# Patient Record
Sex: MALE | Race: BLACK OR AFRICAN AMERICAN | Employment: UNEMPLOYED | URBAN - METROPOLITAN AREA
[De-identification: names, ages, dates, MRNs, and addresses within clinical notes are randomized per-mention and may not be internally consistent; named-entity substitution may affect disease eponyms.]

---

## 2017-03-20 ENCOUNTER — OFFICE VISIT (OUTPATIENT)
Dept: INTERNAL MEDICINE | Age: 62
End: 2017-03-20
Payer: COMMERCIAL

## 2017-03-20 VITALS
SYSTOLIC BLOOD PRESSURE: 123 MMHG | HEART RATE: 73 BPM | BODY MASS INDEX: 27.58 KG/M2 | DIASTOLIC BLOOD PRESSURE: 83 MMHG | WEIGHT: 182 LBS | HEIGHT: 68 IN

## 2017-03-20 DIAGNOSIS — Z01.89 NEEDS SLEEP APNEA ASSESSMENT: ICD-10-CM

## 2017-03-20 DIAGNOSIS — R51.9 HEADACHE, UNSPECIFIED HEADACHE TYPE: ICD-10-CM

## 2017-03-20 DIAGNOSIS — I10 ESSENTIAL HYPERTENSION: Primary | ICD-10-CM

## 2017-03-20 DIAGNOSIS — Z12.12 SCREENING FOR COLORECTAL CANCER: ICD-10-CM

## 2017-03-20 DIAGNOSIS — Z11.59 NEED FOR HEPATITIS C SCREENING TEST: ICD-10-CM

## 2017-03-20 DIAGNOSIS — Z13.1 SCREENING FOR DIABETES MELLITUS (DM): ICD-10-CM

## 2017-03-20 DIAGNOSIS — Z12.11 SCREENING FOR COLORECTAL CANCER: ICD-10-CM

## 2017-03-20 DIAGNOSIS — Z11.4 SCREENING FOR HIV WITHOUT PRESENCE OF RISK FACTORS: ICD-10-CM

## 2017-03-20 PROCEDURE — 99213 OFFICE O/P EST LOW 20 MIN: CPT | Performed by: STUDENT IN AN ORGANIZED HEALTH CARE EDUCATION/TRAINING PROGRAM

## 2017-03-20 ASSESSMENT — PATIENT HEALTH QUESTIONNAIRE - PHQ9
8. MOVING OR SPEAKING SO SLOWLY THAT OTHER PEOPLE COULD HAVE NOTICED. OR THE OPPOSITE, BEING SO FIGETY OR RESTLESS THAT YOU HAVE BEEN MOVING AROUND A LOT MORE THAN USUAL: 0
4. FEELING TIRED OR HAVING LITTLE ENERGY: 3
2. FEELING DOWN, DEPRESSED OR HOPELESS: 0
9. THOUGHTS THAT YOU WOULD BE BETTER OFF DEAD, OR OF HURTING YOURSELF: 0
SUM OF ALL RESPONSES TO PHQ9 QUESTIONS 1 & 2: 0
1. LITTLE INTEREST OR PLEASURE IN DOING THINGS: 0
7. TROUBLE CONCENTRATING ON THINGS, SUCH AS READING THE NEWSPAPER OR WATCHING TELEVISION: 0
3. TROUBLE FALLING OR STAYING ASLEEP: 3
5. POOR APPETITE OR OVEREATING: 0
6. FEELING BAD ABOUT YOURSELF - OR THAT YOU ARE A FAILURE OR HAVE LET YOURSELF OR YOUR FAMILY DOWN: 0
10. IF YOU CHECKED OFF ANY PROBLEMS, HOW DIFFICULT HAVE THESE PROBLEMS MADE IT FOR YOU TO DO YOUR WORK, TAKE CARE OF THINGS AT HOME, OR GET ALONG WITH OTHER PEOPLE: 1
SUM OF ALL RESPONSES TO PHQ QUESTIONS 1-9: 6

## 2017-03-21 RX ORDER — CHLORTHALIDONE 25 MG/1
25 TABLET ORAL DAILY
Qty: 30 TABLET | Refills: 3 | Status: SHIPPED | OUTPATIENT
Start: 2017-03-21 | End: 2017-06-30 | Stop reason: SDUPTHER

## 2017-04-11 ENCOUNTER — TELEPHONE (OUTPATIENT)
Dept: INTERNAL MEDICINE | Age: 62
End: 2017-04-11

## 2017-04-11 DIAGNOSIS — G47.33 OSA (OBSTRUCTIVE SLEEP APNEA): Primary | ICD-10-CM

## 2017-04-12 DIAGNOSIS — G47.33 OSA (OBSTRUCTIVE SLEEP APNEA): Primary | ICD-10-CM

## 2017-04-26 ENCOUNTER — HOSPITAL ENCOUNTER (OUTPATIENT)
Dept: SLEEP CENTER | Age: 62
Discharge: HOME | End: 2017-04-26
Payer: COMMERCIAL

## 2017-04-26 VITALS
DIASTOLIC BLOOD PRESSURE: 83 MMHG | HEIGHT: 68 IN | WEIGHT: 182 LBS | SYSTOLIC BLOOD PRESSURE: 123 MMHG | HEART RATE: 72 BPM | RESPIRATION RATE: 14 BRPM | BODY MASS INDEX: 27.58 KG/M2

## 2017-04-26 DIAGNOSIS — G47.33 OSA (OBSTRUCTIVE SLEEP APNEA): Primary | ICD-10-CM

## 2017-04-26 PROCEDURE — 95810 POLYSOM 6/> YRS 4/> PARAM: CPT

## 2017-04-26 ASSESSMENT — SLEEP AND FATIGUE QUESTIONNAIRES
HOW LIKELY ARE YOU TO NOD OFF OR FALL ASLEEP WHEN YOU ARE A PASSENGER IN A CAR FOR AN HOUR WITHOUT A BREAK: 3
HOW LIKELY ARE YOU TO NOD OFF OR FALL ASLEEP IN A CAR, WHILE STOPPED FOR A FEW MINUTES IN TRAFFIC: 0
HOW LIKELY ARE YOU TO NOD OFF OR FALL ASLEEP WHILE SITTING INACTIVE IN A PUBLIC PLACE: 0
HOW LIKELY ARE YOU TO NOD OFF OR FALL ASLEEP WHILE LYING DOWN TO REST IN THE AFTERNOON WHEN CIRCUMSTANCES PERMIT: 2
HOW LIKELY ARE YOU TO NOD OFF OR FALL ASLEEP WHILE SITTING AND READING: 0
HOW LIKELY ARE YOU TO NOD OFF OR FALL ASLEEP WHILE WATCHING TV: 2
HOW LIKELY ARE YOU TO NOD OFF OR FALL ASLEEP WHILE SITTING AND TALKING TO SOMEONE: 0
ESS TOTAL SCORE: 9
HOW LIKELY ARE YOU TO NOD OFF OR FALL ASLEEP WHILE SITTING QUIETLY AFTER LUNCH WITHOUT ALCOHOL: 2

## 2017-04-27 ASSESSMENT — SLEEP AND FATIGUE QUESTIONNAIRES: NECK CIRCUMFERENCE (INCHES): 15.35

## 2017-06-28 DIAGNOSIS — I10 HYPERTENSION, WELL CONTROLLED: ICD-10-CM

## 2017-06-30 ENCOUNTER — OFFICE VISIT (OUTPATIENT)
Dept: INTERNAL MEDICINE | Age: 62
End: 2017-06-30
Payer: COMMERCIAL

## 2017-06-30 VITALS
DIASTOLIC BLOOD PRESSURE: 88 MMHG | SYSTOLIC BLOOD PRESSURE: 137 MMHG | BODY MASS INDEX: 28.34 KG/M2 | WEIGHT: 187 LBS | HEIGHT: 68 IN | HEART RATE: 69 BPM

## 2017-06-30 DIAGNOSIS — Z23 NEED FOR ZOSTAVAX ADMINISTRATION: ICD-10-CM

## 2017-06-30 DIAGNOSIS — I10 HYPERTENSION, WELL CONTROLLED: Primary | ICD-10-CM

## 2017-06-30 DIAGNOSIS — F17.200 SMOKING: ICD-10-CM

## 2017-06-30 PROCEDURE — 99213 OFFICE O/P EST LOW 20 MIN: CPT | Performed by: INTERNAL MEDICINE

## 2017-06-30 RX ORDER — VARENICLINE TARTRATE 1 MG/1
1 TABLET, FILM COATED ORAL 2 TIMES DAILY
Qty: 60 TABLET | Refills: 3 | Status: SHIPPED | OUTPATIENT
Start: 2017-06-30 | End: 2018-08-17

## 2017-06-30 RX ORDER — CHLORTHALIDONE 25 MG/1
25 TABLET ORAL DAILY
Qty: 30 TABLET | Refills: 3 | Status: SHIPPED | OUTPATIENT
Start: 2017-06-30 | End: 2017-09-29 | Stop reason: SDUPTHER

## 2017-06-30 RX ORDER — VARENICLINE TARTRATE 25 MG
KIT ORAL
Qty: 1 EACH | Refills: 0 | Status: ON HOLD | OUTPATIENT
Start: 2017-06-30 | End: 2017-09-02 | Stop reason: HOSPADM

## 2017-08-31 ENCOUNTER — APPOINTMENT (OUTPATIENT)
Dept: MRI IMAGING | Age: 62
DRG: 054 | End: 2017-08-31
Payer: COMMERCIAL

## 2017-08-31 ENCOUNTER — HOSPITAL ENCOUNTER (INPATIENT)
Age: 62
LOS: 2 days | Discharge: HOME | DRG: 054 | End: 2017-09-02
Attending: EMERGENCY MEDICINE | Admitting: INTERNAL MEDICINE
Payer: COMMERCIAL

## 2017-08-31 ENCOUNTER — APPOINTMENT (OUTPATIENT)
Dept: CT IMAGING | Age: 62
DRG: 054 | End: 2017-08-31
Payer: COMMERCIAL

## 2017-08-31 DIAGNOSIS — G43.919 INTRACTABLE MIGRAINE WITHOUT STATUS MIGRAINOSUS, UNSPECIFIED MIGRAINE TYPE: Primary | ICD-10-CM

## 2017-08-31 LAB
-: ABNORMAL
ABSOLUTE EOS #: 0.5 K/UL (ref 0–0.4)
ABSOLUTE LYMPH #: 3.32 K/UL (ref 1–4.8)
ABSOLUTE MONO #: 2.32 K/UL (ref 0.1–0.8)
AMORPHOUS: ABNORMAL
AMPHETAMINE SCREEN URINE: NEGATIVE
ANION GAP SERPL CALCULATED.3IONS-SCNC: 14 MMOL/L (ref 9–17)
BACTERIA: ABNORMAL
BARBITURATE SCREEN URINE: POSITIVE
BASOPHILS # BLD: 0 %
BASOPHILS ABSOLUTE: 0 K/UL (ref 0–0.2)
BENZODIAZEPINE SCREEN, URINE: NEGATIVE
BILIRUBIN URINE: NEGATIVE
BUN BLDV-MCNC: 17 MG/DL (ref 8–23)
BUN/CREAT BLD: ABNORMAL (ref 9–20)
BUPRENORPHINE URINE: ABNORMAL
C-REACTIVE PROTEIN: 91.9 MG/L (ref 0–5)
CALCIUM SERPL-MCNC: 9.4 MG/DL (ref 8.6–10.4)
CANNABINOID SCREEN URINE: NEGATIVE
CASTS UA: ABNORMAL /LPF (ref 0–8)
CHLORIDE BLD-SCNC: 100 MMOL/L (ref 98–107)
CO2: 23 MMOL/L (ref 20–31)
COCAINE METABOLITE, URINE: NEGATIVE
COLOR: YELLOW
CREAT SERPL-MCNC: 0.92 MG/DL (ref 0.7–1.2)
CRYSTALS, UA: ABNORMAL /HPF
DIFFERENTIAL TYPE: ABNORMAL
EOSINOPHILS RELATIVE PERCENT: 3 %
EPITHELIAL CELLS UA: ABNORMAL /HPF (ref 0–5)
ETHANOL PERCENT: <0.01 %
ETHANOL: <10 MG/DL
GFR AFRICAN AMERICAN: >60 ML/MIN
GFR NON-AFRICAN AMERICAN: >60 ML/MIN
GFR SERPL CREATININE-BSD FRML MDRD: ABNORMAL ML/MIN/{1.73_M2}
GFR SERPL CREATININE-BSD FRML MDRD: ABNORMAL ML/MIN/{1.73_M2}
GLUCOSE BLD-MCNC: 155 MG/DL (ref 70–99)
GLUCOSE URINE: NEGATIVE
HCT VFR BLD CALC: 44.7 % (ref 41–53)
HEMOGLOBIN: 15 G/DL (ref 13.5–17.5)
KETONES, URINE: NEGATIVE
LEUKOCYTE ESTERASE, URINE: ABNORMAL
LYMPHOCYTES # BLD: 20 %
MCH RBC QN AUTO: 31.5 PG (ref 26–34)
MCHC RBC AUTO-ENTMCNC: 33.6 G/DL (ref 31–37)
MCV RBC AUTO: 93.8 FL (ref 80–100)
MDMA URINE: ABNORMAL
METHADONE SCREEN, URINE: NEGATIVE
METHAMPHETAMINE, URINE: ABNORMAL
MONOCYTES # BLD: 14 %
MORPHOLOGY: NORMAL
MUCUS: ABNORMAL
NITRITE, URINE: NEGATIVE
OPIATES, URINE: NEGATIVE
OTHER OBSERVATIONS UA: ABNORMAL
OXYCODONE SCREEN URINE: NEGATIVE
PDW BLD-RTO: 13.4 % (ref 12.5–15.4)
PH UA: 6 (ref 5–8)
PHENCYCLIDINE, URINE: NEGATIVE
PLATELET # BLD: 189 K/UL (ref 140–450)
PLATELET ESTIMATE: ABNORMAL
PMV BLD AUTO: 8.1 FL (ref 6–12)
POTASSIUM SERPL-SCNC: 3.7 MMOL/L (ref 3.7–5.3)
PROPOXYPHENE, URINE: ABNORMAL
PROTEIN UA: NEGATIVE
RBC # BLD: 4.77 M/UL (ref 4.5–5.9)
RBC # BLD: ABNORMAL 10*6/UL
RBC UA: ABNORMAL /HPF (ref 0–4)
RENAL EPITHELIAL, UA: ABNORMAL /HPF
SEDIMENTATION RATE, ERYTHROCYTE: 30 MM (ref 0–10)
SEG NEUTROPHILS: 63 %
SEGMENTED NEUTROPHILS ABSOLUTE COUNT: 10.46 K/UL (ref 1.8–7.7)
SODIUM BLD-SCNC: 137 MMOL/L (ref 135–144)
SPECIFIC GRAVITY UA: 1.07 (ref 1–1.03)
TEST INFORMATION: ABNORMAL
TRICHOMONAS: ABNORMAL
TRICYCLIC ANTIDEPRESSANTS, UR: ABNORMAL
TURBIDITY: CLEAR
URINE HGB: NEGATIVE
UROBILINOGEN, URINE: NORMAL
WBC # BLD: 16.6 K/UL (ref 3.5–11)
WBC # BLD: ABNORMAL 10*3/UL
WBC UA: ABNORMAL /HPF (ref 0–5)
YEAST: ABNORMAL

## 2017-08-31 PROCEDURE — 96375 TX/PRO/DX INJ NEW DRUG ADDON: CPT

## 2017-08-31 PROCEDURE — 2500000003 HC RX 250 WO HCPCS: Performed by: EMERGENCY MEDICINE

## 2017-08-31 PROCEDURE — 96365 THER/PROPH/DIAG IV INF INIT: CPT

## 2017-08-31 PROCEDURE — 86140 C-REACTIVE PROTEIN: CPT

## 2017-08-31 PROCEDURE — 85025 COMPLETE CBC W/AUTO DIFF WBC: CPT

## 2017-08-31 PROCEDURE — 70496 CT ANGIOGRAPHY HEAD: CPT

## 2017-08-31 PROCEDURE — 99285 EMERGENCY DEPT VISIT HI MDM: CPT

## 2017-08-31 PROCEDURE — 6360000004 HC RX CONTRAST MEDICATION: Performed by: EMERGENCY MEDICINE

## 2017-08-31 PROCEDURE — 85651 RBC SED RATE NONAUTOMATED: CPT

## 2017-08-31 PROCEDURE — 6360000002 HC RX W HCPCS: Performed by: EMERGENCY MEDICINE

## 2017-08-31 PROCEDURE — 6360000002 HC RX W HCPCS

## 2017-08-31 PROCEDURE — 80307 DRUG TEST PRSMV CHEM ANLYZR: CPT

## 2017-08-31 PROCEDURE — 2580000003 HC RX 258: Performed by: EMERGENCY MEDICINE

## 2017-08-31 PROCEDURE — 70498 CT ANGIOGRAPHY NECK: CPT

## 2017-08-31 PROCEDURE — 81001 URINALYSIS AUTO W/SCOPE: CPT

## 2017-08-31 PROCEDURE — 80048 BASIC METABOLIC PNL TOTAL CA: CPT

## 2017-08-31 PROCEDURE — 70450 CT HEAD/BRAIN W/O DYE: CPT

## 2017-08-31 PROCEDURE — 6370000000 HC RX 637 (ALT 250 FOR IP): Performed by: INTERNAL MEDICINE

## 2017-08-31 PROCEDURE — 1200000000 HC SEMI PRIVATE

## 2017-08-31 PROCEDURE — 6360000002 HC RX W HCPCS: Performed by: INTERNAL MEDICINE

## 2017-08-31 PROCEDURE — 70551 MRI BRAIN STEM W/O DYE: CPT

## 2017-08-31 PROCEDURE — 6370000000 HC RX 637 (ALT 250 FOR IP): Performed by: EMERGENCY MEDICINE

## 2017-08-31 PROCEDURE — 99222 1ST HOSP IP/OBS MODERATE 55: CPT | Performed by: PSYCHIATRY & NEUROLOGY

## 2017-08-31 PROCEDURE — 93005 ELECTROCARDIOGRAM TRACING: CPT

## 2017-08-31 PROCEDURE — G0480 DRUG TEST DEF 1-7 CLASSES: HCPCS

## 2017-08-31 RX ORDER — ASPIRIN 81 MG/1
81 TABLET ORAL DAILY
Status: DISCONTINUED | OUTPATIENT
Start: 2017-08-31 | End: 2017-09-02 | Stop reason: HOSPADM

## 2017-08-31 RX ORDER — MORPHINE SULFATE 4 MG/ML
4 INJECTION, SOLUTION INTRAMUSCULAR; INTRAVENOUS ONCE
Status: COMPLETED | OUTPATIENT
Start: 2017-08-31 | End: 2017-08-31

## 2017-08-31 RX ORDER — HYDROCODONE BITARTRATE AND ACETAMINOPHEN 5; 325 MG/1; MG/1
1 TABLET ORAL EVERY 4 HOURS PRN
Status: DISCONTINUED | OUTPATIENT
Start: 2017-08-31 | End: 2017-09-02 | Stop reason: HOSPADM

## 2017-08-31 RX ORDER — 0.9 % SODIUM CHLORIDE 0.9 %
1000 INTRAVENOUS SOLUTION INTRAVENOUS ONCE
Status: COMPLETED | OUTPATIENT
Start: 2017-08-31 | End: 2017-08-31

## 2017-08-31 RX ORDER — ACETAMINOPHEN 325 MG/1
650 TABLET ORAL EVERY 4 HOURS PRN
Status: DISCONTINUED | OUTPATIENT
Start: 2017-08-31 | End: 2017-09-02 | Stop reason: HOSPADM

## 2017-08-31 RX ORDER — CHLORTHALIDONE 25 MG/1
25 TABLET ORAL DAILY
Status: DISCONTINUED | OUTPATIENT
Start: 2017-08-31 | End: 2017-09-02 | Stop reason: HOSPADM

## 2017-08-31 RX ORDER — LORAZEPAM 2 MG/ML
2 INJECTION INTRAMUSCULAR ONCE
Status: COMPLETED | OUTPATIENT
Start: 2017-08-31 | End: 2017-08-31

## 2017-08-31 RX ORDER — SODIUM CHLORIDE 0.9 % (FLUSH) 0.9 %
10 SYRINGE (ML) INJECTION EVERY 12 HOURS SCHEDULED
Status: DISCONTINUED | OUTPATIENT
Start: 2017-08-31 | End: 2017-09-02 | Stop reason: HOSPADM

## 2017-08-31 RX ORDER — MORPHINE SULFATE 2 MG/ML
2 INJECTION, SOLUTION INTRAMUSCULAR; INTRAVENOUS
Status: DISCONTINUED | OUTPATIENT
Start: 2017-08-31 | End: 2017-09-02 | Stop reason: HOSPADM

## 2017-08-31 RX ORDER — ASPIRIN 300 MG/1
300 SUPPOSITORY RECTAL DAILY
Status: DISCONTINUED | OUTPATIENT
Start: 2017-08-31 | End: 2017-09-02 | Stop reason: HOSPADM

## 2017-08-31 RX ORDER — VARENICLINE TARTRATE 0.5 MG/1
1 TABLET, FILM COATED ORAL 2 TIMES DAILY
Status: DISCONTINUED | OUTPATIENT
Start: 2017-08-31 | End: 2017-09-02 | Stop reason: HOSPADM

## 2017-08-31 RX ORDER — ONDANSETRON 2 MG/ML
4 INJECTION INTRAMUSCULAR; INTRAVENOUS EVERY 6 HOURS PRN
Status: DISCONTINUED | OUTPATIENT
Start: 2017-08-31 | End: 2017-09-02 | Stop reason: HOSPADM

## 2017-08-31 RX ORDER — ATORVASTATIN CALCIUM 40 MG/1
40 TABLET, FILM COATED ORAL NIGHTLY
Status: DISCONTINUED | OUTPATIENT
Start: 2017-08-31 | End: 2017-09-02 | Stop reason: HOSPADM

## 2017-08-31 RX ORDER — MORPHINE SULFATE 4 MG/ML
4 INJECTION, SOLUTION INTRAMUSCULAR; INTRAVENOUS
Status: DISCONTINUED | OUTPATIENT
Start: 2017-08-31 | End: 2017-09-02 | Stop reason: HOSPADM

## 2017-08-31 RX ORDER — LORAZEPAM 2 MG/ML
INJECTION INTRAMUSCULAR
Status: COMPLETED
Start: 2017-08-31 | End: 2017-08-31

## 2017-08-31 RX ORDER — ASPIRIN 81 MG/1
324 TABLET, CHEWABLE ORAL ONCE
Status: COMPLETED | OUTPATIENT
Start: 2017-08-31 | End: 2017-08-31

## 2017-08-31 RX ORDER — SODIUM CHLORIDE 0.9 % (FLUSH) 0.9 %
10 SYRINGE (ML) INJECTION PRN
Status: DISCONTINUED | OUTPATIENT
Start: 2017-08-31 | End: 2017-09-02 | Stop reason: HOSPADM

## 2017-08-31 RX ORDER — DIPHENHYDRAMINE HYDROCHLORIDE 50 MG/ML
25 INJECTION INTRAMUSCULAR; INTRAVENOUS ONCE
Status: COMPLETED | OUTPATIENT
Start: 2017-08-31 | End: 2017-08-31

## 2017-08-31 RX ORDER — BISACODYL 10 MG
10 SUPPOSITORY, RECTAL RECTAL DAILY PRN
Status: DISCONTINUED | OUTPATIENT
Start: 2017-08-31 | End: 2017-09-02 | Stop reason: HOSPADM

## 2017-08-31 RX ORDER — DEXAMETHASONE SODIUM PHOSPHATE 4 MG/ML
10 INJECTION, SOLUTION INTRA-ARTICULAR; INTRALESIONAL; INTRAMUSCULAR; INTRAVENOUS; SOFT TISSUE ONCE
Status: COMPLETED | OUTPATIENT
Start: 2017-08-31 | End: 2017-08-31

## 2017-08-31 RX ADMIN — ENOXAPARIN SODIUM 40 MG: 40 INJECTION SUBCUTANEOUS at 18:31

## 2017-08-31 RX ADMIN — ASPIRIN 81 MG 324 MG: 81 TABLET ORAL at 13:45

## 2017-08-31 RX ADMIN — LORAZEPAM 2 MG: 2 INJECTION INTRAMUSCULAR at 15:00

## 2017-08-31 RX ADMIN — FOLIC ACID 1 MG: 5 INJECTION, SOLUTION INTRAMUSCULAR; INTRAVENOUS; SUBCUTANEOUS at 18:31

## 2017-08-31 RX ADMIN — DIPHENHYDRAMINE HYDROCHLORIDE 25 MG: 50 INJECTION, SOLUTION INTRAMUSCULAR; INTRAVENOUS at 12:09

## 2017-08-31 RX ADMIN — MORPHINE SULFATE 4 MG: 4 INJECTION, SOLUTION INTRAMUSCULAR; INTRAVENOUS at 13:27

## 2017-08-31 RX ADMIN — PROCHLORPERAZINE EDISYLATE 10 MG: 5 INJECTION INTRAMUSCULAR; INTRAVENOUS at 12:08

## 2017-08-31 RX ADMIN — ATORVASTATIN CALCIUM 40 MG: 40 TABLET, FILM COATED ORAL at 21:40

## 2017-08-31 RX ADMIN — METOPROLOL TARTRATE 12.5 MG: 25 TABLET ORAL at 21:40

## 2017-08-31 RX ADMIN — SODIUM CHLORIDE 1000 ML: 9 INJECTION, SOLUTION INTRAVENOUS at 12:08

## 2017-08-31 RX ADMIN — IOVERSOL 90 ML: 741 INJECTION INTRA-ARTERIAL; INTRAVENOUS at 12:55

## 2017-08-31 RX ADMIN — LORAZEPAM 2 MG: 2 INJECTION INTRAMUSCULAR; INTRAVENOUS at 15:00

## 2017-08-31 RX ADMIN — THIAMINE HYDROCHLORIDE 100 MG: 100 INJECTION, SOLUTION INTRAMUSCULAR; INTRAVENOUS at 13:26

## 2017-08-31 RX ADMIN — DEXAMETHASONE SODIUM PHOSPHATE 10 MG: 4 INJECTION, SOLUTION INTRAMUSCULAR; INTRAVENOUS at 12:09

## 2017-08-31 ASSESSMENT — PAIN DESCRIPTION - PAIN TYPE
TYPE: ACUTE PAIN
TYPE: ACUTE PAIN

## 2017-08-31 ASSESSMENT — PAIN DESCRIPTION - DESCRIPTORS
DESCRIPTORS: SHARP
DESCRIPTORS: SHARP

## 2017-08-31 ASSESSMENT — PAIN DESCRIPTION - ORIENTATION
ORIENTATION: RIGHT;LEFT
ORIENTATION: RIGHT;LEFT

## 2017-08-31 ASSESSMENT — ENCOUNTER SYMPTOMS
VOMITING: 0
NAUSEA: 0
SHORTNESS OF BREATH: 0
ABDOMINAL PAIN: 0
BACK PAIN: 0
PHOTOPHOBIA: 1

## 2017-08-31 ASSESSMENT — PAIN DESCRIPTION - LOCATION
LOCATION: HEAD
LOCATION: HEAD

## 2017-08-31 ASSESSMENT — PAIN DESCRIPTION - PROGRESSION
CLINICAL_PROGRESSION: RESOLVED
CLINICAL_PROGRESSION: NOT CHANGED
CLINICAL_PROGRESSION: RESOLVED

## 2017-08-31 ASSESSMENT — PAIN SCALES - GENERAL
PAINLEVEL_OUTOF10: 4
PAINLEVEL_OUTOF10: 10
PAINLEVEL_OUTOF10: 0
PAINLEVEL_OUTOF10: 0

## 2017-08-31 ASSESSMENT — PAIN DESCRIPTION - FREQUENCY
FREQUENCY: CONTINUOUS
FREQUENCY: CONTINUOUS

## 2017-09-01 PROBLEM — D72.829 LEUKOCYTOSIS: Status: ACTIVE | Noted: 2017-09-01

## 2017-09-01 LAB
ALBUMIN SERPL-MCNC: 3.7 G/DL (ref 3.5–5.2)
ALBUMIN/GLOBULIN RATIO: 0.9 (ref 1–2.5)
ALP BLD-CCNC: 82 U/L (ref 40–129)
ALT SERPL-CCNC: 37 U/L (ref 5–41)
ANION GAP SERPL CALCULATED.3IONS-SCNC: 17 MMOL/L (ref 9–17)
AST SERPL-CCNC: 25 U/L
BILIRUB SERPL-MCNC: 0.44 MG/DL (ref 0.3–1.2)
BUN BLDV-MCNC: 18 MG/DL (ref 8–23)
BUN/CREAT BLD: ABNORMAL (ref 9–20)
CALCIUM SERPL-MCNC: 9.1 MG/DL (ref 8.6–10.4)
CHLORIDE BLD-SCNC: 103 MMOL/L (ref 98–107)
CHOLESTEROL/HDL RATIO: 2.9
CHOLESTEROL: 116 MG/DL
CO2: 20 MMOL/L (ref 20–31)
CREAT SERPL-MCNC: 0.75 MG/DL (ref 0.7–1.2)
EKG ATRIAL RATE: 71 BPM
EKG P AXIS: 47 DEGREES
EKG P-R INTERVAL: 166 MS
EKG Q-T INTERVAL: 438 MS
EKG QRS DURATION: 90 MS
EKG QTC CALCULATION (BAZETT): 475 MS
EKG R AXIS: 23 DEGREES
EKG T AXIS: 52 DEGREES
EKG VENTRICULAR RATE: 71 BPM
ESTIMATED AVERAGE GLUCOSE: 128 MG/DL
GFR AFRICAN AMERICAN: >60 ML/MIN
GFR NON-AFRICAN AMERICAN: >60 ML/MIN
GFR SERPL CREATININE-BSD FRML MDRD: ABNORMAL ML/MIN/{1.73_M2}
GFR SERPL CREATININE-BSD FRML MDRD: ABNORMAL ML/MIN/{1.73_M2}
GLUCOSE BLD-MCNC: 140 MG/DL (ref 70–99)
HBA1C MFR BLD: 6.1 % (ref 4–6)
HCT VFR BLD CALC: 42.3 % (ref 41–53)
HDLC SERPL-MCNC: 40 MG/DL
HEMOGLOBIN: 14 G/DL (ref 13.5–17.5)
LDL CHOLESTEROL: 58 MG/DL (ref 0–130)
LV EF: 55 %
LVEF MODALITY: NORMAL
MCH RBC QN AUTO: 31.6 PG (ref 26–34)
MCHC RBC AUTO-ENTMCNC: 33.2 G/DL (ref 31–37)
MCV RBC AUTO: 95.3 FL (ref 80–100)
PDW BLD-RTO: 13.2 % (ref 12.5–15.4)
PLATELET # BLD: 200 K/UL (ref 140–450)
PMV BLD AUTO: 8.5 FL (ref 6–12)
POTASSIUM SERPL-SCNC: 3.8 MMOL/L (ref 3.7–5.3)
RBC # BLD: 4.44 M/UL (ref 4.5–5.9)
SODIUM BLD-SCNC: 140 MMOL/L (ref 135–144)
TOTAL PROTEIN: 7.6 G/DL (ref 6.4–8.3)
TRIGL SERPL-MCNC: 92 MG/DL
VLDLC SERPL CALC-MCNC: ABNORMAL MG/DL (ref 1–30)
WBC # BLD: 16.7 K/UL (ref 3.5–11)

## 2017-09-01 PROCEDURE — 97535 SELF CARE MNGMENT TRAINING: CPT

## 2017-09-01 PROCEDURE — 99222 1ST HOSP IP/OBS MODERATE 55: CPT | Performed by: PSYCHIATRY & NEUROLOGY

## 2017-09-01 PROCEDURE — 80061 LIPID PANEL: CPT

## 2017-09-01 PROCEDURE — G8987 SELF CARE CURRENT STATUS: HCPCS

## 2017-09-01 PROCEDURE — 99406 BEHAV CHNG SMOKING 3-10 MIN: CPT

## 2017-09-01 PROCEDURE — G8989 SELF CARE D/C STATUS: HCPCS

## 2017-09-01 PROCEDURE — 85027 COMPLETE CBC AUTOMATED: CPT

## 2017-09-01 PROCEDURE — 94762 N-INVAS EAR/PLS OXIMTRY CONT: CPT

## 2017-09-01 PROCEDURE — 6370000000 HC RX 637 (ALT 250 FOR IP): Performed by: INTERNAL MEDICINE

## 2017-09-01 PROCEDURE — 99222 1ST HOSP IP/OBS MODERATE 55: CPT | Performed by: INTERNAL MEDICINE

## 2017-09-01 PROCEDURE — 93306 TTE W/DOPPLER COMPLETE: CPT

## 2017-09-01 PROCEDURE — 80053 COMPREHEN METABOLIC PANEL: CPT

## 2017-09-01 PROCEDURE — 97165 OT EVAL LOW COMPLEX 30 MIN: CPT

## 2017-09-01 PROCEDURE — 2580000003 HC RX 258: Performed by: INTERNAL MEDICINE

## 2017-09-01 PROCEDURE — 6360000002 HC RX W HCPCS: Performed by: INTERNAL MEDICINE

## 2017-09-01 PROCEDURE — G8988 SELF CARE GOAL STATUS: HCPCS

## 2017-09-01 PROCEDURE — 83036 HEMOGLOBIN GLYCOSYLATED A1C: CPT

## 2017-09-01 PROCEDURE — 1200000000 HC SEMI PRIVATE

## 2017-09-01 PROCEDURE — 36415 COLL VENOUS BLD VENIPUNCTURE: CPT

## 2017-09-01 PROCEDURE — 6370000000 HC RX 637 (ALT 250 FOR IP): Performed by: NURSE PRACTITIONER

## 2017-09-01 RX ORDER — SUMATRIPTAN 50 MG/1
50 TABLET, FILM COATED ORAL DAILY PRN
Status: DISCONTINUED | OUTPATIENT
Start: 2017-09-01 | End: 2017-09-02 | Stop reason: HOSPADM

## 2017-09-01 RX ORDER — METHYLPREDNISOLONE SODIUM SUCCINATE 40 MG/ML
40 INJECTION, POWDER, LYOPHILIZED, FOR SOLUTION INTRAMUSCULAR; INTRAVENOUS EVERY 6 HOURS
Status: DISCONTINUED | OUTPATIENT
Start: 2017-09-01 | End: 2017-09-01

## 2017-09-01 RX ORDER — AMITRIPTYLINE HYDROCHLORIDE 25 MG/1
12.5 TABLET, FILM COATED ORAL NIGHTLY
Status: DISCONTINUED | OUTPATIENT
Start: 2017-09-01 | End: 2017-09-02 | Stop reason: HOSPADM

## 2017-09-01 RX ORDER — AMITRIPTYLINE HYDROCHLORIDE 25 MG/1
25 TABLET, FILM COATED ORAL NIGHTLY
Status: DISCONTINUED | OUTPATIENT
Start: 2017-09-08 | End: 2017-09-02 | Stop reason: HOSPADM

## 2017-09-01 RX ADMIN — METOPROLOL TARTRATE 12.5 MG: 25 TABLET ORAL at 08:45

## 2017-09-01 RX ADMIN — ATORVASTATIN CALCIUM 40 MG: 40 TABLET, FILM COATED ORAL at 21:06

## 2017-09-01 RX ADMIN — AMITRIPTYLINE HYDROCHLORIDE 12.5 MG: 25 TABLET, FILM COATED ORAL at 21:06

## 2017-09-01 RX ADMIN — CHLORTHALIDONE 25 MG: 25 TABLET ORAL at 08:47

## 2017-09-01 RX ADMIN — METHYLPREDNISOLONE SODIUM SUCCINATE 40 MG: 40 INJECTION, POWDER, FOR SOLUTION INTRAMUSCULAR; INTRAVENOUS at 12:43

## 2017-09-01 RX ADMIN — METOPROLOL TARTRATE 12.5 MG: 25 TABLET ORAL at 21:06

## 2017-09-01 RX ADMIN — ENOXAPARIN SODIUM 40 MG: 40 INJECTION SUBCUTANEOUS at 08:47

## 2017-09-01 RX ADMIN — Medication 10 ML: at 08:49

## 2017-09-01 RX ADMIN — ASPIRIN 81 MG: 81 TABLET ORAL at 08:47

## 2017-09-01 RX ADMIN — Medication 10 ML: at 21:06

## 2017-09-01 ASSESSMENT — PAIN SCALES - GENERAL: PAINLEVEL_OUTOF10: 0

## 2017-09-02 VITALS
DIASTOLIC BLOOD PRESSURE: 76 MMHG | TEMPERATURE: 97.9 F | WEIGHT: 182.5 LBS | OXYGEN SATURATION: 98 % | RESPIRATION RATE: 16 BRPM | SYSTOLIC BLOOD PRESSURE: 128 MMHG | HEIGHT: 68 IN | HEART RATE: 66 BPM | BODY MASS INDEX: 27.66 KG/M2

## 2017-09-02 LAB
C-REACTIVE PROTEIN: 17.9 MG/L (ref 0–5)
SEDIMENTATION RATE, ERYTHROCYTE: 33 MM (ref 0–10)

## 2017-09-02 PROCEDURE — 6360000002 HC RX W HCPCS: Performed by: INTERNAL MEDICINE

## 2017-09-02 PROCEDURE — 6370000000 HC RX 637 (ALT 250 FOR IP): Performed by: INTERNAL MEDICINE

## 2017-09-02 PROCEDURE — 36415 COLL VENOUS BLD VENIPUNCTURE: CPT

## 2017-09-02 PROCEDURE — 2580000003 HC RX 258: Performed by: INTERNAL MEDICINE

## 2017-09-02 PROCEDURE — 85651 RBC SED RATE NONAUTOMATED: CPT

## 2017-09-02 PROCEDURE — 94762 N-INVAS EAR/PLS OXIMTRY CONT: CPT

## 2017-09-02 PROCEDURE — 99232 SBSQ HOSP IP/OBS MODERATE 35: CPT | Performed by: INTERNAL MEDICINE

## 2017-09-02 PROCEDURE — 86140 C-REACTIVE PROTEIN: CPT

## 2017-09-02 RX ORDER — AMITRIPTYLINE HYDROCHLORIDE 25 MG/1
25 TABLET, FILM COATED ORAL NIGHTLY
Qty: 30 TABLET | Refills: 3 | Status: SHIPPED | OUTPATIENT
Start: 2017-09-08 | End: 2017-09-29 | Stop reason: SDUPTHER

## 2017-09-02 RX ORDER — ASPIRIN 81 MG/1
81 TABLET ORAL DAILY
Qty: 30 TABLET | Refills: 3 | Status: SHIPPED | OUTPATIENT
Start: 2017-09-02 | End: 2017-09-29 | Stop reason: SDUPTHER

## 2017-09-02 RX ORDER — BUTALBITAL, ACETAMINOPHEN AND CAFFEINE 50; 325; 40 MG/1; MG/1; MG/1
1 TABLET ORAL EVERY 4 HOURS PRN
Qty: 60 TABLET | Refills: 0 | Status: SHIPPED | OUTPATIENT
Start: 2017-09-02 | End: 2017-09-29 | Stop reason: SDUPTHER

## 2017-09-02 RX ADMIN — Medication 10 ML: at 09:19

## 2017-09-02 RX ADMIN — ASPIRIN 81 MG: 81 TABLET ORAL at 09:18

## 2017-09-02 RX ADMIN — ENOXAPARIN SODIUM 40 MG: 40 INJECTION SUBCUTANEOUS at 09:20

## 2017-09-02 RX ADMIN — METOPROLOL TARTRATE 12.5 MG: 25 TABLET ORAL at 09:18

## 2017-09-02 RX ADMIN — CHLORTHALIDONE 25 MG: 25 TABLET ORAL at 09:18

## 2017-09-29 ENCOUNTER — OFFICE VISIT (OUTPATIENT)
Dept: INTERNAL MEDICINE | Age: 62
End: 2017-09-29
Payer: COMMERCIAL

## 2017-09-29 VITALS
HEIGHT: 68 IN | SYSTOLIC BLOOD PRESSURE: 135 MMHG | BODY MASS INDEX: 28.46 KG/M2 | HEART RATE: 87 BPM | DIASTOLIC BLOOD PRESSURE: 80 MMHG | WEIGHT: 187.8 LBS

## 2017-09-29 DIAGNOSIS — Z11.59 NEED FOR HEPATITIS C SCREENING TEST: ICD-10-CM

## 2017-09-29 DIAGNOSIS — Z11.4 SCREENING FOR HIV (HUMAN IMMUNODEFICIENCY VIRUS): ICD-10-CM

## 2017-09-29 DIAGNOSIS — Z23 NEED FOR SHINGLES VACCINE: ICD-10-CM

## 2017-09-29 DIAGNOSIS — I10 HYPERTENSION, WELL CONTROLLED: Primary | ICD-10-CM

## 2017-09-29 DIAGNOSIS — R51.9 CHRONIC INTRACTABLE HEADACHE, UNSPECIFIED HEADACHE TYPE: ICD-10-CM

## 2017-09-29 DIAGNOSIS — G89.29 CHRONIC INTRACTABLE HEADACHE, UNSPECIFIED HEADACHE TYPE: ICD-10-CM

## 2017-09-29 DIAGNOSIS — R73.03 PRE-DIABETES: ICD-10-CM

## 2017-09-29 DIAGNOSIS — F17.200 SMOKING: ICD-10-CM

## 2017-09-29 PROCEDURE — 99213 OFFICE O/P EST LOW 20 MIN: CPT | Performed by: HOSPITALIST

## 2017-09-29 RX ORDER — BUTALBITAL, ACETAMINOPHEN AND CAFFEINE 50; 325; 40 MG/1; MG/1; MG/1
1 TABLET ORAL EVERY 4 HOURS PRN
Qty: 60 TABLET | Refills: 0 | Status: SHIPPED | OUTPATIENT
Start: 2017-09-29 | End: 2018-02-02 | Stop reason: SDUPTHER

## 2017-09-29 RX ORDER — ASPIRIN 81 MG/1
81 TABLET ORAL DAILY
Qty: 30 TABLET | Refills: 3 | Status: SHIPPED | OUTPATIENT
Start: 2017-09-29 | End: 2018-02-02 | Stop reason: SDUPTHER

## 2017-09-29 RX ORDER — AMITRIPTYLINE HYDROCHLORIDE 25 MG/1
25 TABLET, FILM COATED ORAL NIGHTLY
Qty: 30 TABLET | Refills: 3 | Status: SHIPPED | OUTPATIENT
Start: 2017-09-29 | End: 2018-02-02 | Stop reason: SDUPTHER

## 2017-09-29 RX ORDER — CHLORTHALIDONE 25 MG/1
25 TABLET ORAL DAILY
Qty: 30 TABLET | Refills: 11 | Status: SHIPPED | OUTPATIENT
Start: 2017-09-29 | End: 2018-02-02 | Stop reason: SDUPTHER

## 2017-12-01 ENCOUNTER — HOSPITAL ENCOUNTER (OUTPATIENT)
Age: 62
Setting detail: SPECIMEN
Discharge: HOME | End: 2017-12-01
Payer: COMMERCIAL

## 2017-12-01 DIAGNOSIS — Z13.1 SCREENING FOR DIABETES MELLITUS (DM): ICD-10-CM

## 2017-12-01 DIAGNOSIS — I10 ESSENTIAL HYPERTENSION: ICD-10-CM

## 2017-12-01 DIAGNOSIS — Z11.4 SCREENING FOR HIV WITHOUT PRESENCE OF RISK FACTORS: ICD-10-CM

## 2017-12-01 DIAGNOSIS — Z11.59 NEED FOR HEPATITIS C SCREENING TEST: ICD-10-CM

## 2017-12-01 LAB
-: ABNORMAL
ABSOLUTE EOS #: 0.14 K/UL (ref 0–0.44)
ABSOLUTE IMMATURE GRANULOCYTE: <0.03 K/UL (ref 0–0.3)
ABSOLUTE LYMPH #: 3.08 K/UL (ref 1.1–3.7)
ABSOLUTE MONO #: 1.09 K/UL (ref 0.1–1.2)
AMORPHOUS: ABNORMAL
ANION GAP SERPL CALCULATED.3IONS-SCNC: 21 MMOL/L (ref 9–17)
BACTERIA: ABNORMAL
BASOPHILS # BLD: 1 % (ref 0–2)
BASOPHILS ABSOLUTE: 0.08 K/UL (ref 0–0.2)
BILIRUBIN URINE: NEGATIVE
BUN BLDV-MCNC: 12 MG/DL (ref 8–23)
BUN/CREAT BLD: ABNORMAL (ref 9–20)
CALCIUM SERPL-MCNC: 9.5 MG/DL (ref 8.6–10.4)
CASTS UA: ABNORMAL /LPF (ref 0–8)
CHLORIDE BLD-SCNC: 101 MMOL/L (ref 98–107)
CHOLESTEROL/HDL RATIO: 2.4
CHOLESTEROL: 139 MG/DL
CO2: 21 MMOL/L (ref 20–31)
COLOR: ABNORMAL
COMMENT UA: ABNORMAL
CREAT SERPL-MCNC: 0.75 MG/DL (ref 0.7–1.2)
CREATININE URINE: 212.8 MG/DL (ref 39–259)
CRYSTALS, UA: ABNORMAL /HPF
DIFFERENTIAL TYPE: ABNORMAL
EOSINOPHILS RELATIVE PERCENT: 2 % (ref 1–4)
EPITHELIAL CELLS UA: ABNORMAL /HPF (ref 0–5)
ESTIMATED AVERAGE GLUCOSE: 134 MG/DL
GFR AFRICAN AMERICAN: >60 ML/MIN
GFR NON-AFRICAN AMERICAN: >60 ML/MIN
GFR SERPL CREATININE-BSD FRML MDRD: ABNORMAL ML/MIN/{1.73_M2}
GFR SERPL CREATININE-BSD FRML MDRD: ABNORMAL ML/MIN/{1.73_M2}
GLUCOSE BLD-MCNC: 135 MG/DL (ref 70–99)
GLUCOSE URINE: NEGATIVE
HBA1C MFR BLD: 6.3 % (ref 4–6)
HCT VFR BLD CALC: 43.5 % (ref 40.7–50.3)
HDLC SERPL-MCNC: 57 MG/DL
HEMOGLOBIN: 14 G/DL (ref 13–17)
HEPATITIS C ANTIBODY: REACTIVE
HIV AG/AB: NONREACTIVE
IMMATURE GRANULOCYTES: 0 %
KETONES, URINE: NEGATIVE
LDL CHOLESTEROL: 61 MG/DL (ref 0–130)
LEUKOCYTE ESTERASE, URINE: ABNORMAL
LYMPHOCYTES # BLD: 39 % (ref 24–43)
MCH RBC QN AUTO: 30.6 PG (ref 25.2–33.5)
MCHC RBC AUTO-ENTMCNC: 32.2 G/DL (ref 28.4–34.8)
MCV RBC AUTO: 95 FL (ref 82.6–102.9)
MONOCYTES # BLD: 14 % (ref 3–12)
MUCUS: ABNORMAL
NITRITE, URINE: POSITIVE
OTHER OBSERVATIONS UA: ABNORMAL
PDW BLD-RTO: 12.7 % (ref 11.8–14.4)
PH UA: 7 (ref 5–8)
PLATELET # BLD: 180 K/UL (ref 138–453)
PLATELET ESTIMATE: ABNORMAL
PMV BLD AUTO: 10.9 FL (ref 8.1–13.5)
POTASSIUM SERPL-SCNC: 4 MMOL/L (ref 3.7–5.3)
PROTEIN UA: NEGATIVE
RBC # BLD: 4.58 M/UL (ref 4.21–5.77)
RBC # BLD: ABNORMAL 10*6/UL
RBC UA: ABNORMAL /HPF (ref 0–4)
RENAL EPITHELIAL, UA: ABNORMAL /HPF
SEG NEUTROPHILS: 44 % (ref 36–65)
SEGMENTED NEUTROPHILS ABSOLUTE COUNT: 3.54 K/UL (ref 1.5–8.1)
SODIUM BLD-SCNC: 143 MMOL/L (ref 135–144)
SPECIFIC GRAVITY UA: 1.02 (ref 1–1.03)
TOTAL PROTEIN, URINE: 44 MG/DL
TRICHOMONAS: ABNORMAL
TRIGL SERPL-MCNC: 104 MG/DL
TURBIDITY: ABNORMAL
URINE HGB: NEGATIVE
UROBILINOGEN, URINE: NORMAL
VLDLC SERPL CALC-MCNC: NORMAL MG/DL (ref 1–30)
WBC # BLD: 8 K/UL (ref 3.5–11.3)
WBC # BLD: ABNORMAL 10*3/UL
WBC UA: ABNORMAL /HPF (ref 0–5)
YEAST: ABNORMAL

## 2017-12-01 PROCEDURE — 36415 COLL VENOUS BLD VENIPUNCTURE: CPT

## 2017-12-01 PROCEDURE — 86803 HEPATITIS C AB TEST: CPT

## 2017-12-01 PROCEDURE — 85025 COMPLETE CBC W/AUTO DIFF WBC: CPT

## 2017-12-01 PROCEDURE — 87389 HIV-1 AG W/HIV-1&-2 AB AG IA: CPT

## 2017-12-01 PROCEDURE — 80061 LIPID PANEL: CPT

## 2017-12-01 PROCEDURE — 83036 HEMOGLOBIN GLYCOSYLATED A1C: CPT

## 2017-12-01 PROCEDURE — 80048 BASIC METABOLIC PNL TOTAL CA: CPT

## 2018-02-02 ENCOUNTER — OFFICE VISIT (OUTPATIENT)
Dept: INTERNAL MEDICINE | Age: 63
End: 2018-02-02
Payer: COMMERCIAL

## 2018-02-02 VITALS
HEART RATE: 78 BPM | DIASTOLIC BLOOD PRESSURE: 76 MMHG | SYSTOLIC BLOOD PRESSURE: 121 MMHG | BODY MASS INDEX: 27.77 KG/M2 | HEIGHT: 68 IN | WEIGHT: 183.2 LBS

## 2018-02-02 DIAGNOSIS — K21.9 GASTROESOPHAGEAL REFLUX DISEASE WITHOUT ESOPHAGITIS: ICD-10-CM

## 2018-02-02 DIAGNOSIS — R51.9 CHRONIC INTRACTABLE HEADACHE, UNSPECIFIED HEADACHE TYPE: ICD-10-CM

## 2018-02-02 DIAGNOSIS — I10 HYPERTENSION, WELL CONTROLLED: ICD-10-CM

## 2018-02-02 DIAGNOSIS — R73.03 PRE-DIABETES: Primary | ICD-10-CM

## 2018-02-02 DIAGNOSIS — G89.29 CHRONIC INTRACTABLE HEADACHE, UNSPECIFIED HEADACHE TYPE: ICD-10-CM

## 2018-02-02 PROCEDURE — 99213 OFFICE O/P EST LOW 20 MIN: CPT | Performed by: INTERNAL MEDICINE

## 2018-02-02 PROCEDURE — G8484 FLU IMMUNIZE NO ADMIN: HCPCS | Performed by: INTERNAL MEDICINE

## 2018-02-02 PROCEDURE — G8427 DOCREV CUR MEDS BY ELIG CLIN: HCPCS | Performed by: INTERNAL MEDICINE

## 2018-02-02 PROCEDURE — 3017F COLORECTAL CA SCREEN DOC REV: CPT | Performed by: INTERNAL MEDICINE

## 2018-02-02 PROCEDURE — G8419 CALC BMI OUT NRM PARAM NOF/U: HCPCS | Performed by: INTERNAL MEDICINE

## 2018-02-02 PROCEDURE — 4004F PT TOBACCO SCREEN RCVD TLK: CPT | Performed by: INTERNAL MEDICINE

## 2018-02-02 RX ORDER — BUTALBITAL, ACETAMINOPHEN AND CAFFEINE 50; 325; 40 MG/1; MG/1; MG/1
1 TABLET ORAL EVERY 4 HOURS PRN
Qty: 60 TABLET | Refills: 0 | Status: SHIPPED | OUTPATIENT
Start: 2018-02-02 | End: 2018-04-02 | Stop reason: SDUPTHER

## 2018-02-02 RX ORDER — ASPIRIN 81 MG/1
81 TABLET ORAL DAILY
Qty: 30 TABLET | Refills: 3 | Status: SHIPPED | OUTPATIENT
Start: 2018-02-02 | End: 2018-06-01 | Stop reason: SDUPTHER

## 2018-02-02 RX ORDER — CHLORTHALIDONE 25 MG/1
25 TABLET ORAL DAILY
Qty: 30 TABLET | Refills: 3 | Status: SHIPPED | OUTPATIENT
Start: 2018-02-02 | End: 2018-07-04 | Stop reason: SDUPTHER

## 2018-02-02 RX ORDER — FAMOTIDINE 20 MG/1
20 TABLET, FILM COATED ORAL 2 TIMES DAILY
Qty: 60 TABLET | Refills: 3 | Status: SHIPPED | OUTPATIENT
Start: 2018-02-02 | End: 2018-05-23 | Stop reason: SDUPTHER

## 2018-02-02 RX ORDER — AMITRIPTYLINE HYDROCHLORIDE 25 MG/1
25 TABLET, FILM COATED ORAL NIGHTLY
Qty: 30 TABLET | Refills: 3 | Status: SHIPPED | OUTPATIENT
Start: 2018-02-02 | End: 2018-06-01 | Stop reason: SDUPTHER

## 2018-02-02 ASSESSMENT — ENCOUNTER SYMPTOMS
WHEEZING: 0
NAUSEA: 0
VOMITING: 0
SHORTNESS OF BREATH: 0
CONSTIPATION: 0
ABDOMINAL PAIN: 0
HEMOPTYSIS: 0
BLOOD IN STOOL: 0
COUGH: 1
HEARTBURN: 1
DIARRHEA: 0

## 2018-02-02 NOTE — PROGRESS NOTES
Visit Information    Have you changed or started any medications since your last visit including any over-the-counter medicines, vitamins, or herbal medicines? no   Are you having any side effects from any of your medications? -  no  Have you stopped taking any of your medications? Is so, why? -  no    Have you seen any other physician or provider since your last visit? No  Have you had any other diagnostic tests since your last visit? No  Have you been seen in the emergency room and/or had an admission to a hospital since we last saw you? No  Have you had your routine dental cleaning in the past 6 months? no    Have you activated your [employer] account? If not, what are your barriers?  Yes     Patient Care Team:  Emanuel Souza MD as PCP - General (Internal Medicine)    Medical History Review  Past Medical, Family, and Social History reviewed and does contribute to the patient presenting condition    Health Maintenance   Topic Date Due    Colon cancer screen colonoscopy  01/15/2006    Zostavax vaccine  01/15/2016    Flu vaccine (1) 09/01/2017    A1C test (Diabetic or Prediabetic)  12/01/2018    Potassium monitoring  12/01/2018    Creatinine monitoring  12/01/2018    Lipid screen  12/01/2022    DTaP/Tdap/Td vaccine (2 - Td) 02/26/2026    Pneumococcal med risk  Completed    Hepatitis C screen  Completed    HIV screen  Completed
no distension. There is no tenderness. Musculoskeletal: He exhibits no edema. Neurological: He is alert and oriented to person, place, and time. No cranial nerve deficit. LABORATORY FINDINGS:    CBC:  Lab Results   Component Value Date    WBC 8.0 12/01/2017    HGB 14.0 12/01/2017     12/01/2017     BMP:    Lab Results   Component Value Date     12/01/2017    K 4.0 12/01/2017     12/01/2017    CO2 21 12/01/2017    BUN 12 12/01/2017    CREATININE 0.75 12/01/2017    GLUCOSE 135 12/01/2017     HEMOGLOBIN A1C:   Lab Results   Component Value Date    LABA1C 6.3 12/01/2017     MICROALBUMIN URINE: No results found for: MICROALBUR  FASTING LIPID PANEL:  Lab Results   Component Value Date    CHOL 139 12/01/2017    HDL 57 12/01/2017    TRIG 104 12/01/2017     LIVER PROFILE:  Lab Results   Component Value Date    ALT 37 09/01/2017    AST 25 09/01/2017    PROT 7.6 09/01/2017    BILITOT 0.44 09/01/2017    LABALBU 3.7 09/01/2017      THYROID FUNCTION:   Lab Results   Component Value Date    TSH 1.14 03/10/2016      URINE ANALYSIS: No results found for: [address] [address]:      Health Maintenance Due   Topic Date Due    Colon cancer screen colonoscopy  01/15/2006    Zostavax vaccine  01/15/2016    Flu vaccine (1) 09/01/2017       ASSESSMENT AND PLAN:    1. Gastroesophageal reflux disease without esophagitis    - famotidine (PEPCID) 20 MG tablet; Take 1 tablet by mouth 2 times daily  Dispense: 60 tablet; Refill: 3    2. Hypertension, well controlled    - metoprolol tartrate (LOPRESSOR) 25 MG tablet; Take 0.5 tablets by mouth 2 times daily  Dispense: 60 tablet; Refill: 11  - chlorthalidone (HYGROTON) 25 MG tablet; Take 1 tablet by mouth daily  Dispense: 30 tablet; Refill: 11  - aspirin 81 MG EC tablet; Take 1 tablet by mouth daily  Dispense: 30 tablet; Refill: 3    3. Pre-diabetes    - metFORMIN (GLUCOPHAGE) 500 MG tablet;  Take 1 tablet by mouth 2 times daily (with meals)  Dispense: 60

## 2018-03-02 ENCOUNTER — HOSPITAL ENCOUNTER (OUTPATIENT)
Age: 63
Setting detail: SPECIMEN
Discharge: HOME | End: 2018-03-02
Payer: COMMERCIAL

## 2018-03-02 DIAGNOSIS — Z12.12 SCREENING FOR COLORECTAL CANCER: ICD-10-CM

## 2018-03-02 DIAGNOSIS — Z12.11 SCREENING FOR COLORECTAL CANCER: ICD-10-CM

## 2018-03-02 LAB
DATE, STOOL #1: NORMAL
DATE, STOOL #2: NORMAL
DATE, STOOL #3: NORMAL
HEMOCCULT SP1 STL QL: NEGATIVE
HEMOCCULT SP2 STL QL: NORMAL
HEMOCCULT SP3 STL QL: NORMAL
TIME, STOOL #1: NORMAL
TIME, STOOL #2: NORMAL
TIME, STOOL #3: NORMAL

## 2018-04-02 DIAGNOSIS — R51.9 CHRONIC INTRACTABLE HEADACHE, UNSPECIFIED HEADACHE TYPE: ICD-10-CM

## 2018-04-02 DIAGNOSIS — G89.29 CHRONIC INTRACTABLE HEADACHE, UNSPECIFIED HEADACHE TYPE: ICD-10-CM

## 2018-04-04 RX ORDER — BUTALBITAL, ACETAMINOPHEN AND CAFFEINE 50; 325; 40 MG/1; MG/1; MG/1
TABLET ORAL
Qty: 60 TABLET | Refills: 0 | Status: SHIPPED | OUTPATIENT
Start: 2018-04-04 | End: 2018-05-04 | Stop reason: SDUPTHER

## 2018-05-04 DIAGNOSIS — R51.9 CHRONIC INTRACTABLE HEADACHE, UNSPECIFIED HEADACHE TYPE: ICD-10-CM

## 2018-05-04 DIAGNOSIS — G89.29 CHRONIC INTRACTABLE HEADACHE, UNSPECIFIED HEADACHE TYPE: ICD-10-CM

## 2018-05-04 RX ORDER — BUTALBITAL, ACETAMINOPHEN AND CAFFEINE 50; 325; 40 MG/1; MG/1; MG/1
1 TABLET ORAL EVERY 12 HOURS PRN
Qty: 30 TABLET | Refills: 2 | Status: SHIPPED | OUTPATIENT
Start: 2018-05-04 | End: 2018-05-25 | Stop reason: SDUPTHER

## 2018-05-23 DIAGNOSIS — K21.9 GASTROESOPHAGEAL REFLUX DISEASE WITHOUT ESOPHAGITIS: ICD-10-CM

## 2018-05-23 RX ORDER — FAMOTIDINE 20 MG/1
TABLET, FILM COATED ORAL
Qty: 60 TABLET | Refills: 3 | Status: SHIPPED | OUTPATIENT
Start: 2018-05-23 | End: 2018-06-02 | Stop reason: SDUPTHER

## 2018-05-25 ENCOUNTER — OFFICE VISIT (OUTPATIENT)
Dept: INTERNAL MEDICINE | Age: 63
End: 2018-05-25
Payer: COMMERCIAL

## 2018-05-25 VITALS
SYSTOLIC BLOOD PRESSURE: 116 MMHG | WEIGHT: 178 LBS | HEART RATE: 89 BPM | DIASTOLIC BLOOD PRESSURE: 68 MMHG | BODY MASS INDEX: 26.98 KG/M2 | HEIGHT: 68 IN

## 2018-05-25 DIAGNOSIS — I10 ESSENTIAL HYPERTENSION: ICD-10-CM

## 2018-05-25 DIAGNOSIS — R51.9 CHRONIC INTRACTABLE HEADACHE, UNSPECIFIED HEADACHE TYPE: ICD-10-CM

## 2018-05-25 DIAGNOSIS — Z72.0 TOBACCO ABUSE: ICD-10-CM

## 2018-05-25 DIAGNOSIS — M25.512 ACUTE PAIN OF LEFT SHOULDER: Primary | ICD-10-CM

## 2018-05-25 DIAGNOSIS — G89.29 CHRONIC INTRACTABLE HEADACHE, UNSPECIFIED HEADACHE TYPE: ICD-10-CM

## 2018-05-25 DIAGNOSIS — R06.02 SOB (SHORTNESS OF BREATH) ON EXERTION: ICD-10-CM

## 2018-05-25 DIAGNOSIS — R73.9 HYPERGLYCEMIA: ICD-10-CM

## 2018-05-25 LAB — HBA1C MFR BLD: 6 %

## 2018-05-25 PROCEDURE — G8427 DOCREV CUR MEDS BY ELIG CLIN: HCPCS | Performed by: HOSPITALIST

## 2018-05-25 PROCEDURE — 4004F PT TOBACCO SCREEN RCVD TLK: CPT | Performed by: HOSPITALIST

## 2018-05-25 PROCEDURE — G8417 CALC BMI ABV UP PARAM F/U: HCPCS | Performed by: HOSPITALIST

## 2018-05-25 PROCEDURE — 83036 HEMOGLOBIN GLYCOSYLATED A1C: CPT | Performed by: HOSPITALIST

## 2018-05-25 PROCEDURE — 99214 OFFICE O/P EST MOD 30 MIN: CPT | Performed by: INTERNAL MEDICINE

## 2018-05-25 PROCEDURE — 3017F COLORECTAL CA SCREEN DOC REV: CPT | Performed by: HOSPITALIST

## 2018-05-25 PROCEDURE — 99213 OFFICE O/P EST LOW 20 MIN: CPT | Performed by: HOSPITALIST

## 2018-05-25 RX ORDER — ALBUTEROL SULFATE 90 UG/1
2 AEROSOL, METERED RESPIRATORY (INHALATION) EVERY 6 HOURS PRN
Qty: 1 INHALER | Refills: 3 | Status: SHIPPED | OUTPATIENT
Start: 2018-05-25 | End: 2018-12-26 | Stop reason: SDUPTHER

## 2018-05-25 RX ORDER — BUTALBITAL, ACETAMINOPHEN AND CAFFEINE 50; 325; 40 MG/1; MG/1; MG/1
1 TABLET ORAL EVERY 12 HOURS PRN
Qty: 20 TABLET | Refills: 0 | Status: SHIPPED | OUTPATIENT
Start: 2018-05-25 | End: 2018-08-29 | Stop reason: SDUPTHER

## 2018-05-25 RX ORDER — IBUPROFEN 800 MG/1
800 TABLET ORAL EVERY 6 HOURS PRN
Qty: 120 TABLET | Refills: 3 | Status: SHIPPED | OUTPATIENT
Start: 2018-05-25 | End: 2019-06-06 | Stop reason: SDUPTHER

## 2018-05-25 ASSESSMENT — PATIENT HEALTH QUESTIONNAIRE - PHQ9
SUM OF ALL RESPONSES TO PHQ9 QUESTIONS 1 & 2: 0
2. FEELING DOWN, DEPRESSED OR HOPELESS: 0
1. LITTLE INTEREST OR PLEASURE IN DOING THINGS: 0
SUM OF ALL RESPONSES TO PHQ QUESTIONS 1-9: 0

## 2018-05-25 ASSESSMENT — ENCOUNTER SYMPTOMS
SHORTNESS OF BREATH: 1
NAUSEA: 0
ABDOMINAL PAIN: 0
COUGH: 0
WHEEZING: 1
EYES NEGATIVE: 1
VOMITING: 0
DIARRHEA: 0
HEMOPTYSIS: 0
HEARTBURN: 0
BLOOD IN STOOL: 0
ORTHOPNEA: 0
CONSTIPATION: 0
SPUTUM PRODUCTION: 0

## 2018-06-01 DIAGNOSIS — K21.9 GASTROESOPHAGEAL REFLUX DISEASE WITHOUT ESOPHAGITIS: ICD-10-CM

## 2018-06-01 DIAGNOSIS — R51.9 CHRONIC INTRACTABLE HEADACHE, UNSPECIFIED HEADACHE TYPE: ICD-10-CM

## 2018-06-01 DIAGNOSIS — I10 HYPERTENSION, WELL CONTROLLED: ICD-10-CM

## 2018-06-01 DIAGNOSIS — G89.29 CHRONIC INTRACTABLE HEADACHE, UNSPECIFIED HEADACHE TYPE: ICD-10-CM

## 2018-06-01 DIAGNOSIS — R73.03 PRE-DIABETES: ICD-10-CM

## 2018-06-02 RX ORDER — ASPIRIN 81 MG/1
TABLET ORAL
Qty: 30 TABLET | Refills: 3 | Status: SHIPPED | OUTPATIENT
Start: 2018-06-02 | End: 2018-10-09 | Stop reason: SDUPTHER

## 2018-06-02 RX ORDER — AMITRIPTYLINE HYDROCHLORIDE 25 MG/1
TABLET, FILM COATED ORAL
Qty: 30 TABLET | Refills: 3 | Status: SHIPPED | OUTPATIENT
Start: 2018-06-02 | End: 2018-10-09 | Stop reason: SDUPTHER

## 2018-06-02 RX ORDER — FAMOTIDINE 20 MG/1
TABLET, FILM COATED ORAL
Qty: 60 TABLET | Refills: 3 | Status: SHIPPED | OUTPATIENT
Start: 2018-06-02 | End: 2018-10-09 | Stop reason: SDUPTHER

## 2018-06-08 ENCOUNTER — HOSPITAL ENCOUNTER (OUTPATIENT)
Age: 63
Discharge: HOME | End: 2018-06-10
Payer: COMMERCIAL

## 2018-06-08 ENCOUNTER — HOSPITAL ENCOUNTER (OUTPATIENT)
Dept: PULMONOLOGY | Age: 63
Discharge: HOME | End: 2018-06-08
Payer: COMMERCIAL

## 2018-06-08 ENCOUNTER — HOSPITAL ENCOUNTER (OUTPATIENT)
Dept: GENERAL RADIOLOGY | Age: 63
Discharge: HOME | End: 2018-06-10
Payer: COMMERCIAL

## 2018-06-08 DIAGNOSIS — R06.02 SOB (SHORTNESS OF BREATH) ON EXERTION: ICD-10-CM

## 2018-06-08 DIAGNOSIS — Z72.0 TOBACCO ABUSE: ICD-10-CM

## 2018-06-08 DIAGNOSIS — M25.512 ACUTE PAIN OF LEFT SHOULDER: ICD-10-CM

## 2018-06-08 PROCEDURE — 94640 AIRWAY INHALATION TREATMENT: CPT

## 2018-06-08 PROCEDURE — 94729 DIFFUSING CAPACITY: CPT

## 2018-06-08 PROCEDURE — 88738 HGB QUANT TRANSCUTANEOUS: CPT

## 2018-06-08 PROCEDURE — 94726 PLETHYSMOGRAPHY LUNG VOLUMES: CPT

## 2018-06-08 PROCEDURE — 94760 N-INVAS EAR/PLS OXIMETRY 1: CPT

## 2018-06-08 PROCEDURE — 99406 BEHAV CHNG SMOKING 3-10 MIN: CPT

## 2018-06-08 PROCEDURE — 94060 EVALUATION OF WHEEZING: CPT

## 2018-06-08 PROCEDURE — 94664 DEMO&/EVAL PT USE INHALER: CPT

## 2018-06-08 PROCEDURE — 71046 X-RAY EXAM CHEST 2 VIEWS: CPT

## 2018-06-08 PROCEDURE — 73030 X-RAY EXAM OF SHOULDER: CPT

## 2018-06-16 ENCOUNTER — TELEPHONE (OUTPATIENT)
Dept: INTERNAL MEDICINE CLINIC | Age: 63
End: 2018-06-16

## 2018-06-16 DIAGNOSIS — J98.4 RESTRICTIVE LUNG DISEASE: Primary | ICD-10-CM

## 2018-07-04 DIAGNOSIS — I10 HYPERTENSION, WELL CONTROLLED: ICD-10-CM

## 2018-07-06 RX ORDER — CHLORTHALIDONE 25 MG/1
TABLET ORAL
Qty: 30 TABLET | Refills: 3 | Status: SHIPPED | OUTPATIENT
Start: 2018-07-06 | End: 2018-08-29 | Stop reason: ALTCHOICE

## 2018-08-17 ENCOUNTER — OFFICE VISIT (OUTPATIENT)
Dept: PULMONOLOGY | Age: 63
End: 2018-08-17
Payer: COMMERCIAL

## 2018-08-17 ENCOUNTER — TELEPHONE (OUTPATIENT)
Dept: PULMONOLOGY | Age: 63
End: 2018-08-17

## 2018-08-17 VITALS
DIASTOLIC BLOOD PRESSURE: 83 MMHG | BODY MASS INDEX: 26.87 KG/M2 | SYSTOLIC BLOOD PRESSURE: 134 MMHG | WEIGHT: 177.3 LBS | HEIGHT: 68 IN

## 2018-08-17 DIAGNOSIS — R06.09 DYSPNEA ON EXERTION: ICD-10-CM

## 2018-08-17 DIAGNOSIS — R94.2 DECREASED DIFFUSION CAPACITY: ICD-10-CM

## 2018-08-17 DIAGNOSIS — F17.210 SMOKING GREATER THAN 30 PACK YEARS: ICD-10-CM

## 2018-08-17 DIAGNOSIS — J44.9 COPD, SEVERITY TO BE DETERMINED (HCC): Primary | ICD-10-CM

## 2018-08-17 DIAGNOSIS — R53.81 PHYSICAL DECONDITIONING: ICD-10-CM

## 2018-08-17 DIAGNOSIS — I10 ESSENTIAL HYPERTENSION: ICD-10-CM

## 2018-08-17 PROCEDURE — 3023F SPIROM DOC REV: CPT | Performed by: INTERNAL MEDICINE

## 2018-08-17 PROCEDURE — 4004F PT TOBACCO SCREEN RCVD TLK: CPT | Performed by: INTERNAL MEDICINE

## 2018-08-17 PROCEDURE — 3017F COLORECTAL CA SCREEN DOC REV: CPT | Performed by: INTERNAL MEDICINE

## 2018-08-17 PROCEDURE — G8427 DOCREV CUR MEDS BY ELIG CLIN: HCPCS | Performed by: INTERNAL MEDICINE

## 2018-08-17 PROCEDURE — G8417 CALC BMI ABV UP PARAM F/U: HCPCS | Performed by: INTERNAL MEDICINE

## 2018-08-17 PROCEDURE — 99204 OFFICE O/P NEW MOD 45 MIN: CPT | Performed by: INTERNAL MEDICINE

## 2018-08-17 PROCEDURE — G8926 SPIRO NO PERF OR DOC: HCPCS | Performed by: INTERNAL MEDICINE

## 2018-08-17 ASSESSMENT — ENCOUNTER SYMPTOMS
ALLERGIC/IMMUNOLOGIC NEGATIVE: 1
GASTROINTESTINAL NEGATIVE: 1
SHORTNESS OF BREATH: 1
EYES NEGATIVE: 1

## 2018-08-17 NOTE — TELEPHONE ENCOUNTER
Back in May, Dr Eladio Luna ordered a CT chest for him but it never got scheduled. It was also ordered incorrectly as a Diagnostic CT for lung screening. I assume you would like him in the screening program?     If so, please put in whatever order you want him to have and I will get him scheduled and make sure he gets his follow up with you. ALSO: Please sign for the samples of Stiolto I gave him. Thanks!

## 2018-08-27 ENCOUNTER — TELEPHONE (OUTPATIENT)
Dept: PULMONOLOGY | Age: 63
End: 2018-08-27

## 2018-08-29 ENCOUNTER — OFFICE VISIT (OUTPATIENT)
Dept: INTERNAL MEDICINE | Age: 63
End: 2018-08-29
Payer: COMMERCIAL

## 2018-08-29 ENCOUNTER — HOSPITAL ENCOUNTER (OUTPATIENT)
Age: 63
Setting detail: SPECIMEN
Discharge: HOME | End: 2018-08-29
Payer: COMMERCIAL

## 2018-08-29 VITALS
DIASTOLIC BLOOD PRESSURE: 84 MMHG | BODY MASS INDEX: 27.58 KG/M2 | OXYGEN SATURATION: 98 % | HEART RATE: 72 BPM | WEIGHT: 181.4 LBS | SYSTOLIC BLOOD PRESSURE: 125 MMHG

## 2018-08-29 DIAGNOSIS — G89.29 CHRONIC INTRACTABLE HEADACHE, UNSPECIFIED HEADACHE TYPE: ICD-10-CM

## 2018-08-29 DIAGNOSIS — Z87.891 PERSONAL HISTORY OF TOBACCO USE: ICD-10-CM

## 2018-08-29 DIAGNOSIS — R76.8 HEPATITIS C ANTIBODY POSITIVE IN BLOOD: ICD-10-CM

## 2018-08-29 DIAGNOSIS — E11.9 CONTROLLED TYPE 2 DIABETES MELLITUS WITHOUT COMPLICATION, WITHOUT LONG-TERM CURRENT USE OF INSULIN (HCC): ICD-10-CM

## 2018-08-29 DIAGNOSIS — R51.9 CHRONIC INTRACTABLE HEADACHE, UNSPECIFIED HEADACHE TYPE: ICD-10-CM

## 2018-08-29 DIAGNOSIS — I10 ESSENTIAL HYPERTENSION: Primary | ICD-10-CM

## 2018-08-29 DIAGNOSIS — Z23 NEED FOR PROPHYLACTIC VACCINATION AND INOCULATION AGAINST VARICELLA: ICD-10-CM

## 2018-08-29 LAB
ABSOLUTE EOS #: 0.14 K/UL (ref 0–0.44)
ABSOLUTE IMMATURE GRANULOCYTE: 0.03 K/UL (ref 0–0.3)
ABSOLUTE LYMPH #: 3.62 K/UL (ref 1.1–3.7)
ABSOLUTE MONO #: 1.12 K/UL (ref 0.1–1.2)
AFP: 9.3 UG/L
ALBUMIN SERPL-MCNC: 4.4 G/DL (ref 3.5–5.2)
ALBUMIN/GLOBULIN RATIO: 1.2 (ref 1–2.5)
ALP BLD-CCNC: 89 U/L (ref 40–129)
ALT SERPL-CCNC: 48 U/L (ref 5–41)
AST SERPL-CCNC: 41 U/L
BASOPHILS # BLD: 1 % (ref 0–2)
BASOPHILS ABSOLUTE: 0.08 K/UL (ref 0–0.2)
BILIRUB SERPL-MCNC: 0.37 MG/DL (ref 0.3–1.2)
BILIRUBIN DIRECT: 0.12 MG/DL
BILIRUBIN, INDIRECT: 0.25 MG/DL (ref 0–1)
CREATININE URINE: 169.9 MG/DL (ref 39–259)
DIFFERENTIAL TYPE: NORMAL
EOSINOPHILS RELATIVE PERCENT: 1 % (ref 1–4)
GLOBULIN: ABNORMAL G/DL (ref 1.5–3.8)
HAV AB SERPL IA-ACNC: NONREACTIVE
HBV SURFACE AB TITR SER: <3.5 MIU/ML
HCT VFR BLD CALC: 42.9 % (ref 40.7–50.3)
HEMOGLOBIN: 13.8 G/DL (ref 13–17)
HEPATITIS B SURFACE ANTIGEN: NONREACTIVE
IMMATURE GRANULOCYTES: 0 %
INR BLD: 1
LYMPHOCYTES # BLD: 37 % (ref 24–43)
MCH RBC QN AUTO: 31 PG (ref 25.2–33.5)
MCHC RBC AUTO-ENTMCNC: 32.2 G/DL (ref 28.4–34.8)
MCV RBC AUTO: 96.4 FL (ref 82.6–102.9)
MICROALBUMIN/CREAT 24H UR: 27 MG/L
MICROALBUMIN/CREAT UR-RTO: 16 MCG/MG CREAT
MONOCYTES # BLD: 11 % (ref 3–12)
NRBC AUTOMATED: 0 PER 100 WBC
PDW BLD-RTO: 13.3 % (ref 11.8–14.4)
PLATELET # BLD: 179 K/UL (ref 138–453)
PLATELET ESTIMATE: NORMAL
PMV BLD AUTO: 10.6 FL (ref 8.1–13.5)
PROTHROMBIN TIME: 10.6 SEC (ref 9–12)
RBC # BLD: 4.45 M/UL (ref 4.21–5.77)
RBC # BLD: NORMAL 10*6/UL
SEG NEUTROPHILS: 50 % (ref 36–65)
SEGMENTED NEUTROPHILS ABSOLUTE COUNT: 4.84 K/UL (ref 1.5–8.1)
T3 FREE: 2.97 PG/ML (ref 2.02–4.43)
THYROXINE, FREE: 1.21 NG/DL (ref 0.93–1.7)
TOTAL PROTEIN: 8.2 G/DL (ref 6.4–8.3)
TSH SERPL DL<=0.05 MIU/L-ACNC: 2.68 MIU/L (ref 0.3–5)
WBC # BLD: 9.8 K/UL (ref 3.5–11.3)
WBC # BLD: NORMAL 10*3/UL

## 2018-08-29 PROCEDURE — 84439 ASSAY OF FREE THYROXINE: CPT

## 2018-08-29 PROCEDURE — G8427 DOCREV CUR MEDS BY ELIG CLIN: HCPCS | Performed by: HOSPITALIST

## 2018-08-29 PROCEDURE — 36415 COLL VENOUS BLD VENIPUNCTURE: CPT

## 2018-08-29 PROCEDURE — 82043 UR ALBUMIN QUANTITATIVE: CPT

## 2018-08-29 PROCEDURE — 82570 ASSAY OF URINE CREATININE: CPT

## 2018-08-29 PROCEDURE — 85025 COMPLETE CBC W/AUTO DIFF WBC: CPT

## 2018-08-29 PROCEDURE — 80076 HEPATIC FUNCTION PANEL: CPT

## 2018-08-29 PROCEDURE — 4004F PT TOBACCO SCREEN RCVD TLK: CPT | Performed by: HOSPITALIST

## 2018-08-29 PROCEDURE — 87522 HEPATITIS C REVRS TRNSCRPJ: CPT

## 2018-08-29 PROCEDURE — 87902 NFCT AGT GNTYP ALYS HEP C: CPT

## 2018-08-29 PROCEDURE — 86317 IMMUNOASSAY INFECTIOUS AGENT: CPT

## 2018-08-29 PROCEDURE — 86708 HEPATITIS A ANTIBODY: CPT

## 2018-08-29 PROCEDURE — 84481 FREE ASSAY (FT-3): CPT

## 2018-08-29 PROCEDURE — 84443 ASSAY THYROID STIM HORMONE: CPT

## 2018-08-29 PROCEDURE — G0296 VISIT TO DETERM LDCT ELIG: HCPCS | Performed by: HOSPITALIST

## 2018-08-29 PROCEDURE — 3044F HG A1C LEVEL LT 7.0%: CPT | Performed by: HOSPITALIST

## 2018-08-29 PROCEDURE — 87340 HEPATITIS B SURFACE AG IA: CPT

## 2018-08-29 PROCEDURE — 85610 PROTHROMBIN TIME: CPT

## 2018-08-29 PROCEDURE — G8417 CALC BMI ABV UP PARAM F/U: HCPCS | Performed by: HOSPITALIST

## 2018-08-29 PROCEDURE — 82105 ALPHA-FETOPROTEIN SERUM: CPT

## 2018-08-29 PROCEDURE — 99213 OFFICE O/P EST LOW 20 MIN: CPT | Performed by: HOSPITALIST

## 2018-08-29 PROCEDURE — 99211 OFF/OP EST MAY X REQ PHY/QHP: CPT | Performed by: INTERNAL MEDICINE

## 2018-08-29 PROCEDURE — 3017F COLORECTAL CA SCREEN DOC REV: CPT | Performed by: HOSPITALIST

## 2018-08-29 PROCEDURE — 2022F DILAT RTA XM EVC RTNOPTHY: CPT | Performed by: HOSPITALIST

## 2018-08-29 RX ORDER — LOSARTAN POTASSIUM 25 MG/1
25 TABLET ORAL DAILY
Qty: 30 TABLET | Refills: 3 | Status: SHIPPED | OUTPATIENT
Start: 2018-08-29 | End: 2018-12-26 | Stop reason: SDUPTHER

## 2018-08-29 RX ORDER — BUTALBITAL, ACETAMINOPHEN AND CAFFEINE 50; 325; 40 MG/1; MG/1; MG/1
1 TABLET ORAL EVERY 12 HOURS PRN
Qty: 20 TABLET | Refills: 2 | Status: SHIPPED | OUTPATIENT
Start: 2018-08-29 | End: 2019-06-12 | Stop reason: SDUPTHER

## 2018-08-29 ASSESSMENT — ENCOUNTER SYMPTOMS
RESPIRATORY NEGATIVE: 1
GASTROINTESTINAL NEGATIVE: 1
EYES NEGATIVE: 1

## 2018-08-29 NOTE — PROGRESS NOTES
[facility]  [facility] [address] [facility]  [address] [facility] [address] [facility] [address]  Dept: [phone]  Dept Fax: [phone]    Office Progress/Follow Up Note  Date of patient's visit: 8/29/2018  Patient's Name:  Jorge Valenzuela YOB: 1955            Patient Care Team:  Pamela Nagy MD as PCP - General (Internal Medicine)  Geovanna Zazueta MD as PCP - S Attributed Provider  Joseph Schaffer MD as Consulting Physician (Pulmonology)  ================================================================    REASON FOR VISIT/CHIEF COMPLAINT:  Hypertension (3 month f//u)    HISTORY OF PRESENTING ILLNESS:  History was obtained from: patient, electronic medical record. Jorge Valenzuela is a 59 y.o. is here for a follow-up visit for hypertension. Patient last hemoglobin A1c was 6.0. Patient states he is diet-controlled. Been taking his blood sugars at home. Patient denies any polydipsia polyuria or polyphagia. Patient reports she is taking his blood pressure medications as prescribed. Blood pressure has been controlled. Patient denies any significant headaches, chest pain, shortness of breath, fatigue. Patient denies any side effects to the medications at this time. Patient still has some complaints of left shoulder pain. Patient reports he is getting in with physical therapy after this office visit. Shoulder x-rays reviewed shows mild arthritic changes. Patient followed up with Dr. Nadia Santizo with pulmonary clinic and recommends to have low-dose CT scan for lung cancer screening. Pt was placed on Stiolto respimat inhaler for which he reports taking the medication. Hepatitis C screening from 12/1/2017 is reactive. Patient reports she has not been treated or followed up on. Will start workup for hepatitis C. Patient is amenable to referral for GI for hepatitis C treatment.         Patient Active Problem List   Diagnosis    Essential hypertension    Cigarette smoker    Pneumonia of right lower lobe due to infectious organism ([address]    Chronic coughing    Alcohol abuse    Headache    Intractable migraine without status migrainosus    Ataxia    Leukocytosis    COPD, severity to be determined [facility])       Health Maintenance Due   Topic Date Due    Shingles Vaccine (1 of 2 - 2 Dose Series) 01/15/2006    Low dose CT lung screening  01/15/2011       No Known Allergies      Current Outpatient Prescriptions   Medication Sig Dispense Refill    butalbital-acetaminophen-caffeine (FIORICET, ESGIC) -40 MG per tablet Take 1 tablet by mouth every 12 hours as needed for Headaches Will cause drowsiness. Do not drive if taking. 20 tablet 2    zoster recombinant adjuvanted vaccine (SHINGRIX) 50 MCG SUSR injection 50 MCG IM then repeat 2-6 months. 0.5 mL 1    losartan (COZAAR) 25 MG tablet Take 1 tablet by mouth daily 30 tablet 3    Tiotropium Bromide-Olodaterol (STIOLTO RESPIMAT) 2.5-2.5 MCG/ACT AERS Inhale 2 puffs into the lungs daily 2 Inhaler 0    ASPIRIN ADULT LOW STRENGTH 81 MG EC tablet TAKE ONE TABLET BY MOUTH ONE TIME A DAY 30 tablet 3    metFORMIN (GLUCOPHAGE) 500 MG tablet TAKE ONE TABLET BY MOUTH TWICE A DAY WITH MEALS 60 tablet 3    amitriptyline (ELAVIL) 25 MG tablet TAKE ONE TABLET BY MOUTH ONE TIME A DAY NIGHTLY 30 tablet 3    famotidine (PEPCID) 20 MG tablet TAKE ONE TABLET BY MOUTH TWICE A DAY 60 tablet 3    ibuprofen (ADVIL;MOTRIN) 800 MG tablet Take 1 tablet by mouth every 6 hours as needed for Pain 120 tablet 3    albuterol sulfate  (90 Base) MCG/ACT inhaler Inhale 2 puffs into the lungs every 6 hours as needed for Wheezing 1 Inhaler 3    metoprolol tartrate (LOPRESSOR) 25 MG tablet Take 0.5 tablets by mouth 2 times daily 60 tablet 3     No current facility-administered medications for this visit.         Social History   Substance Use Topics    Smoking status: Current Every Day Smoker     Packs/day: 0.75     Years: 40.00     Types: Cigarettes has no rales. He exhibits no tenderness. Abdominal: Soft. Bowel sounds are normal. He exhibits no distension and no mass. There is no tenderness. There is no rebound and no guarding. Musculoskeletal: Normal range of motion. He exhibits no edema, tenderness or deformity. Lymphadenopathy:     He has no cervical adenopathy. Neurological: He is alert and oriented to person, place, and time. He has normal reflexes. He displays normal reflexes. No cranial nerve deficit. He exhibits normal muscle tone. Gait normal. Coordination normal. GCS score is 15. Skin: Skin is warm and dry. No rash noted. He is not diaphoretic. No erythema. No pallor. Psychiatric: Mood, memory, affect and judgment normal.         DIAGNOSTIC FINDINGS:  CBC:  Lab Results   Component Value Date    WBC 8.0 12/01/2017    HGB 14.0 12/01/2017     12/01/2017       BMP:    Lab Results   Component Value Date     12/01/2017    K 4.0 12/01/2017     12/01/2017    CO2 21 12/01/2017    BUN 12 12/01/2017    CREATININE 0.75 12/01/2017    GLUCOSE 135 12/01/2017       HEMOGLOBIN A1C:   Lab Results   Component Value Date    LABA1C 6.0 05/25/2018       FASTING LIPID PANEL:  Lab Results   Component Value Date    CHOL 139 12/01/2017    HDL 57 12/01/2017    TRIG 104 12/01/2017       ASSESSMENT AND PLAN:  Rinku Brennan was seen today for hypertension. Diagnoses and all orders for this visit:    Essential hypertension  -     losartan (COZAAR) 25 MG tablet; Take 1 tablet by mouth daily    Controlled type 2 diabetes mellitus without complication, without long-term current use of insulin (HCC)  -     Microalbumin, Ur; Future    Hepatitis C antibody positive in blood  -     Hepatitis C RNA, Quantitative, PCR; Future  -     Protime-INR; Future  -     Hepatic Function Panel; Future  -     CBC With Auto Differential; Future  -     TSH; Future  -     T3, Free; Future  -     T4, Free; Future  -     AFP Tumor Marker; Future  -     US LIVER;  Future  - Hepatitis A Antibody, Total; Future  -     Hepatitis B Surface Antibody; Future  -     Hepatitis B Surface Antigen; Future  -     Hepatitis C Genotyping; Future  -     [address]  -     APTT; Future    Chronic intractable headache, unspecified headache type  -     butalbital-acetaminophen-caffeine (FIORICET, ESGIC) -40 MG per tablet; Take 1 tablet by mouth every 12 hours as needed for Headaches Will cause drowsiness. Do not drive if taking. Need for prophylactic vaccination and inoculation against varicella  -     zoster recombinant adjuvanted vaccine (SHINGRIX) 50 MCG SUSR injection; 50 MCG IM then repeat 2-6 months. Personal history of tobacco use  -     SC VISIT TO DISCUSS LUNG CA SCREEN W LDCT  -     CT Lung Screening; Future      Low-dose CT scan of the chest for the lung cancer screening ordered  GI referral for hepatitis C treatment ordered  Hepatitis C workup ordered  Diabetes controlled on Glucophage  Hypertension controlled on chlorthalidone and metoprolol, will stop Chlorthalidone and Start Cozaar 25 mg po qd. Pt could not take Lisinopril secondary to cough. Will order a urine microalbumin    FOLLOW UP AND INSTRUCTIONS:  Return in about 3 months (around 11/29/2018). · Sade Chun received counseling on the following healthy behaviors: nutrition, exercise, medication adherence and tobacco cessation    · Discussed use, benefit, and side effects of prescribed medications. Barriers to medication compliance addressed. All patient questions answered. Pt voiced understanding.      · Patient given educational materials - see patient instructions    Khadijah Panda MD PGY-3; Internal Medicine  [place] [id number] Internal Medicine Associate  8/29/2018, 12:33 PM    This note is created with the assistance of a speech-recognition program. While intending to generate a document that actually reflects the content of the visit, the document can still have

## 2018-08-30 ENCOUNTER — TELEPHONE (OUTPATIENT)
Dept: ONCOLOGY | Age: 63
End: 2018-08-30

## 2018-08-30 NOTE — LETTER
8/30/2018        [address] [place] [id number]    Dear Mike Roman:    Your healthcare provider has ordered a low dose CT scan of the chest for lung cancer screening. You will find enclosed, information about CT lung screening. Please review the statement of understanding, you will be asked to sign a copy of this at the time of your CT scan    If you have not already been contacted to make the appointment for your scan, please call our scheduling department at [phone]    Keep in mind that CT lung screening does not take the place of smoking cessation. If you are a current smoker, you will find enclosed smoking cessation resources. Please do not hesitate to contact me if you have any questions or concerns.     Kindest Regards,      Violetta Aguero, RN, BSN  Lung Nurse [facility]  [phone]

## 2018-09-01 LAB
DIRECT EXAM: ABNORMAL
HCV QUANTITATIVE: NORMAL
HEPATITIS C GENOTYPE: NORMAL
Lab: ABNORMAL
SPECIMEN DESCRIPTION: ABNORMAL
STATUS: ABNORMAL

## 2018-09-12 ENCOUNTER — HOSPITAL ENCOUNTER (OUTPATIENT)
Dept: CT IMAGING | Age: 63
Discharge: HOME | End: 2018-09-14
Payer: COMMERCIAL

## 2018-09-12 ENCOUNTER — HOSPITAL ENCOUNTER (OUTPATIENT)
Dept: ULTRASOUND IMAGING | Age: 63
Discharge: HOME | End: 2018-09-14
Payer: COMMERCIAL

## 2018-09-12 DIAGNOSIS — Z87.891 PERSONAL HISTORY OF TOBACCO USE: ICD-10-CM

## 2018-09-12 DIAGNOSIS — R76.8 HEPATITIS C ANTIBODY POSITIVE IN BLOOD: ICD-10-CM

## 2018-09-12 PROCEDURE — 76705 ECHO EXAM OF ABDOMEN: CPT

## 2018-09-12 PROCEDURE — G0297 LDCT FOR LUNG CA SCREEN: HCPCS

## 2018-10-09 DIAGNOSIS — R51.9 CHRONIC INTRACTABLE HEADACHE, UNSPECIFIED HEADACHE TYPE: ICD-10-CM

## 2018-10-09 DIAGNOSIS — G89.29 CHRONIC INTRACTABLE HEADACHE, UNSPECIFIED HEADACHE TYPE: ICD-10-CM

## 2018-10-09 DIAGNOSIS — I10 HYPERTENSION, WELL CONTROLLED: ICD-10-CM

## 2018-10-09 DIAGNOSIS — R73.03 PRE-DIABETES: ICD-10-CM

## 2018-10-09 DIAGNOSIS — K21.9 GASTROESOPHAGEAL REFLUX DISEASE WITHOUT ESOPHAGITIS: ICD-10-CM

## 2018-10-10 RX ORDER — AMITRIPTYLINE HYDROCHLORIDE 25 MG/1
TABLET, FILM COATED ORAL
Qty: 30 TABLET | Refills: 3 | Status: SHIPPED | OUTPATIENT
Start: 2018-10-10 | End: 2019-02-18 | Stop reason: SDUPTHER

## 2018-10-10 RX ORDER — FAMOTIDINE 20 MG/1
TABLET, FILM COATED ORAL
Qty: 60 TABLET | Refills: 3 | Status: SHIPPED | OUTPATIENT
Start: 2018-10-10 | End: 2019-02-18 | Stop reason: SDUPTHER

## 2018-10-10 RX ORDER — ASPIRIN 81 MG/1
TABLET ORAL
Qty: 30 TABLET | Refills: 3 | Status: SHIPPED | OUTPATIENT
Start: 2018-10-10 | End: 2018-11-08 | Stop reason: SDUPTHER

## 2018-10-10 NOTE — TELEPHONE ENCOUNTER
Medications refilled. Follow up appointment noted in December. Chrissy Montoya M.D.  PGY-3 Internal Medicine  [id number]

## 2018-11-08 DIAGNOSIS — I10 HYPERTENSION, WELL CONTROLLED: ICD-10-CM

## 2018-11-08 DIAGNOSIS — G89.29 CHRONIC INTRACTABLE HEADACHE, UNSPECIFIED HEADACHE TYPE: ICD-10-CM

## 2018-11-08 DIAGNOSIS — R51.9 CHRONIC INTRACTABLE HEADACHE, UNSPECIFIED HEADACHE TYPE: ICD-10-CM

## 2018-11-09 RX ORDER — ASPIRIN 81 MG/1
TABLET ORAL
Qty: 30 TABLET | Refills: 3 | Status: SHIPPED | OUTPATIENT
Start: 2018-11-09 | End: 2019-06-06 | Stop reason: SDUPTHER

## 2018-12-26 ENCOUNTER — OFFICE VISIT (OUTPATIENT)
Dept: INTERNAL MEDICINE | Age: 63
End: 2018-12-26
Payer: COMMERCIAL

## 2018-12-26 VITALS
BODY MASS INDEX: 27.89 KG/M2 | HEIGHT: 68 IN | WEIGHT: 184 LBS | DIASTOLIC BLOOD PRESSURE: 92 MMHG | SYSTOLIC BLOOD PRESSURE: 143 MMHG | HEART RATE: 100 BPM

## 2018-12-26 DIAGNOSIS — R06.02 SOB (SHORTNESS OF BREATH) ON EXERTION: Primary | ICD-10-CM

## 2018-12-26 DIAGNOSIS — R73.03 PRE-DIABETES: ICD-10-CM

## 2018-12-26 DIAGNOSIS — I10 ESSENTIAL HYPERTENSION: ICD-10-CM

## 2018-12-26 DIAGNOSIS — Z72.0 TOBACCO ABUSE: ICD-10-CM

## 2018-12-26 DIAGNOSIS — Z13.220 NEED FOR LIPID SCREENING: ICD-10-CM

## 2018-12-26 DIAGNOSIS — B18.2 CHRONIC HEPATITIS C WITHOUT HEPATIC COMA (HCC): ICD-10-CM

## 2018-12-26 DIAGNOSIS — J44.9 CHRONIC OBSTRUCTIVE PULMONARY DISEASE, UNSPECIFIED COPD TYPE (HCC): ICD-10-CM

## 2018-12-26 DIAGNOSIS — Z23 NEED FOR PROPHYLACTIC VACCINATION AND INOCULATION AGAINST VARICELLA: ICD-10-CM

## 2018-12-26 DIAGNOSIS — Z23 NEED FOR INFLUENZA VACCINATION: ICD-10-CM

## 2018-12-26 PROCEDURE — G8417 CALC BMI ABV UP PARAM F/U: HCPCS | Performed by: HOSPITALIST

## 2018-12-26 PROCEDURE — 90686 IIV4 VACC NO PRSV 0.5 ML IM: CPT | Performed by: HOSPITALIST

## 2018-12-26 PROCEDURE — 3017F COLORECTAL CA SCREEN DOC REV: CPT | Performed by: HOSPITALIST

## 2018-12-26 PROCEDURE — 4004F PT TOBACCO SCREEN RCVD TLK: CPT | Performed by: HOSPITALIST

## 2018-12-26 PROCEDURE — 99211 OFF/OP EST MAY X REQ PHY/QHP: CPT | Performed by: INTERNAL MEDICINE

## 2018-12-26 PROCEDURE — G8427 DOCREV CUR MEDS BY ELIG CLIN: HCPCS | Performed by: HOSPITALIST

## 2018-12-26 PROCEDURE — 3023F SPIROM DOC REV: CPT | Performed by: HOSPITALIST

## 2018-12-26 PROCEDURE — G8926 SPIRO NO PERF OR DOC: HCPCS | Performed by: HOSPITALIST

## 2018-12-26 PROCEDURE — G8482 FLU IMMUNIZE ORDER/ADMIN: HCPCS | Performed by: HOSPITALIST

## 2018-12-26 PROCEDURE — 99214 OFFICE O/P EST MOD 30 MIN: CPT | Performed by: HOSPITALIST

## 2018-12-26 RX ORDER — BLOOD PRESSURE TEST KIT
1 KIT MISCELLANEOUS DAILY
Qty: 1 KIT | Refills: 0 | Status: SHIPPED | OUTPATIENT
Start: 2018-12-26 | End: 2020-04-21

## 2018-12-26 RX ORDER — LOSARTAN POTASSIUM 50 MG/1
25 TABLET ORAL DAILY
Qty: 30 TABLET | Refills: 3 | Status: SHIPPED | OUTPATIENT
Start: 2018-12-26 | End: 2019-01-15 | Stop reason: SDUPTHER

## 2018-12-26 RX ORDER — ALBUTEROL SULFATE 90 UG/1
2 AEROSOL, METERED RESPIRATORY (INHALATION) EVERY 6 HOURS PRN
Qty: 1 INHALER | Refills: 3 | Status: SHIPPED | OUTPATIENT
Start: 2018-12-26 | End: 2019-06-12 | Stop reason: SDUPTHER

## 2018-12-26 ASSESSMENT — ENCOUNTER SYMPTOMS
ABDOMINAL DISTENTION: 0
EYE DISCHARGE: 0
CHOKING: 0
COLOR CHANGE: 0
NAUSEA: 0
VOMITING: 0
VOICE CHANGE: 0
STRIDOR: 0
DIARRHEA: 0
SHORTNESS OF BREATH: 1
APNEA: 0
SORE THROAT: 0
RHINORRHEA: 0
CONSTIPATION: 0
SINUS PAIN: 0
COUGH: 1
CHEST TIGHTNESS: 0
WHEEZING: 0
SINUS PRESSURE: 0
EYE ITCHING: 0
EYE REDNESS: 0
ALLERGIC/IMMUNOLOGIC NEGATIVE: 1
FACIAL SWELLING: 0

## 2018-12-26 NOTE — PATIENT INSTRUCTIONS
Printed script for Blood Pressure Kit signed and given to pt  LABORATORY INSTRUCTIONS    Your doctor has ordered blood or urine testing. You can get this testing done at the Lab located on the first floor of the [facility], or at any other [facility]. Please stop at Main Registration, before going to the lab, as you must be registered first.     Please get this lab done before your next visit. You may NOT eat, drink, smoke, or chew anything before this test for 8 hours. You may still have water. Referral to [address] [place] [address] faxed to [address] [place] [address], they will contact the patient to schedule. Phone number given to patient. Summary of Care printed. Patient given informational packet. Referral to Gastroenterology dropped into work queue for [facility] , they will contact the patient to schedule. Phone number given to patient. Summary of Care printed. Return To Clinic 3/27/2019. After Visit Summary  given and reviewed. --MA    It is very important for your care that you keep your appointment. If for some reason you are unable to keep your appointment it is equally important that you call our office at [phone] to cancel your appointment and reschedule. Failure to do so may result in your termination from our practice.

## 2018-12-26 NOTE — PROGRESS NOTES
HYPERTENSION visit     BP Readings from Last 3 Encounters:   08/29/18 125/84   08/17/18 134/83   05/25/18 116/68       LDL Cholesterol (mg/dL)   Date Value   12/01/2017 61     HDL (mg/dL)   Date Value   12/01/2017 57     BUN (mg/dL)   Date Value   12/01/2017 12     CREATININE (mg/dL)   Date Value   12/01/2017 0.75     Glucose (mg/dL)   Date Value   12/01/2017 135 (H)              Have you changed or started any medications since your last visit including any over-the-counter medicines, vitamins, or herbal medicines? no   Have you stopped taking any of your medications? Is so, why? -  no  Are you having any side effects from any of your medications? - no  How often do you miss doses of your medication? rare      Have you seen any other physician or provider since your last visit?  no   Have you had any other diagnostic tests since your last visit? yes - labs, CT, US     Have you been seen in the emergency room and/or had an admission in a hospital since we last saw you?  no   Have you had your routine dental cleaning in the past 6 months?  no     Do you have an active MyChart account? If no, what is the barrier?   Yes    Patient Care Team:  María Jones MD as PCP - General (Internal Medicine)  Dorota Cisneros MD as PCP - S Attributed Provider  Lynn Lama MD as Consulting Physician (Pulmonology)  Erick Quiñones, RN as Nurse Navigator    Medical History Review  Past Medical, Family, and Social History reviewed and does contribute to the patient presenting condition    Health Maintenance   Topic Date Due    Diabetic foot exam  01/15/1966    Diabetic retinal exam  01/15/1966    Shingles Vaccine (1 of 2 - 2 Dose Series) 01/15/2006    Flu vaccine (1) 09/01/2018    Potassium monitoring  12/01/2018    Creatinine monitoring  12/01/2018    Lipid screen  12/01/2018    Colon Cancer Screen FIT/FOBT  03/02/2019    A1C test (Diabetic or Prediabetic)  05/25/2019    Diabetic microalbuminuria test  08/29/2019    Low
Essential hypertension    Cigarette smoker    Pneumonia of right lower lobe due to infectious organism ([address]    Chronic coughing    Alcohol abuse    Headache    Intractable migraine without status migrainosus    Ataxia    Leukocytosis    COPD, severity to be determined [facility])       Health Maintenance Due   Topic Date Due    Diabetic foot exam  01/15/1966    Diabetic retinal exam  01/15/1966    Shingles Vaccine (1 of 2 - 2 Dose Series) 01/15/2006    Potassium monitoring  12/01/2018    Creatinine monitoring  12/01/2018    Lipid screen  12/01/2018       No Known Allergies      Current Outpatient Prescriptions   Medication Sig Dispense Refill    albuterol sulfate  (90 Base) MCG/ACT inhaler Inhale 2 puffs into the lungs every 6 hours as needed for Wheezing 1 Inhaler 3    Tiotropium Bromide-Olodaterol (STIOLTO RESPIMAT) 2.5-2.5 MCG/ACT AERS Inhale 2 puffs into the lungs daily 2 Inhaler 2    Blood Pressure KIT 1 kit by Does not apply route daily 1 kit 0    losartan (COZAAR) 50 MG tablet Take 0.5 tablets by mouth daily 30 tablet 3    aspirin (ASPIRIN ADULT LOW STRENGTH) 81 MG EC tablet TAKE ONE TABLET BY MOUTH ONE TIME A DAY 30 tablet 3    metoprolol tartrate (LOPRESSOR) 25 MG tablet TAKE 1/2 TABLET TWICE A DAY 60 tablet 3    famotidine (PEPCID) 20 MG tablet TAKE ONE TABLET BY MOUTH TWICE A DAY 60 tablet 3    metFORMIN (GLUCOPHAGE) 500 MG tablet TAKE ONE TABLET BY MOUTH TWICE A DAY WITH MEALS 60 tablet 3    amitriptyline (ELAVIL) 25 MG tablet TAKE ONE TABLET BY MOUTH ONE TIME A DAY NIGHTLY 30 tablet 3    butalbital-acetaminophen-caffeine (FIORICET, ESGIC) -40 MG per tablet Take 1 tablet by mouth every 12 hours as needed for Headaches Will cause drowsiness. Do not drive if taking. 20 tablet 2    ibuprofen (ADVIL;MOTRIN) 800 MG tablet Take 1 tablet by mouth every 6 hours as needed for Pain 120 tablet 3     No current facility-administered medications for this visit.         Social History

## 2019-01-13 DIAGNOSIS — I10 ESSENTIAL HYPERTENSION: ICD-10-CM

## 2019-01-15 RX ORDER — LOSARTAN POTASSIUM 25 MG/1
TABLET ORAL
Qty: 30 TABLET | Refills: 3 | Status: SHIPPED | OUTPATIENT
Start: 2019-01-15 | End: 2019-03-27

## 2019-02-18 DIAGNOSIS — K21.9 GASTROESOPHAGEAL REFLUX DISEASE WITHOUT ESOPHAGITIS: ICD-10-CM

## 2019-02-18 DIAGNOSIS — G89.29 CHRONIC INTRACTABLE HEADACHE, UNSPECIFIED HEADACHE TYPE: ICD-10-CM

## 2019-02-18 DIAGNOSIS — R73.03 PRE-DIABETES: ICD-10-CM

## 2019-02-18 DIAGNOSIS — R51.9 CHRONIC INTRACTABLE HEADACHE, UNSPECIFIED HEADACHE TYPE: ICD-10-CM

## 2019-02-18 RX ORDER — AMITRIPTYLINE HYDROCHLORIDE 25 MG/1
TABLET, FILM COATED ORAL
Qty: 30 TABLET | Refills: 3 | Status: SHIPPED | OUTPATIENT
Start: 2019-02-18 | End: 2019-07-11 | Stop reason: SDUPTHER

## 2019-02-18 RX ORDER — FAMOTIDINE 20 MG/1
TABLET, FILM COATED ORAL
Qty: 60 TABLET | Refills: 3 | Status: SHIPPED | OUTPATIENT
Start: 2019-02-18 | End: 2019-07-11 | Stop reason: SDUPTHER

## 2019-03-27 ENCOUNTER — OFFICE VISIT (OUTPATIENT)
Dept: INTERNAL MEDICINE | Age: 64
End: 2019-03-27
Payer: COMMERCIAL

## 2019-03-27 VITALS
BODY MASS INDEX: 26.98 KG/M2 | WEIGHT: 178 LBS | DIASTOLIC BLOOD PRESSURE: 82 MMHG | HEIGHT: 68 IN | HEART RATE: 71 BPM | SYSTOLIC BLOOD PRESSURE: 152 MMHG

## 2019-03-27 DIAGNOSIS — Z72.0 TOBACCO ABUSE: ICD-10-CM

## 2019-03-27 DIAGNOSIS — R73.03 PREDIABETES: ICD-10-CM

## 2019-03-27 DIAGNOSIS — I10 ESSENTIAL HYPERTENSION: ICD-10-CM

## 2019-03-27 DIAGNOSIS — J98.4 RESTRICTIVE LUNG DISEASE: ICD-10-CM

## 2019-03-27 DIAGNOSIS — B18.2 CHRONIC HEPATITIS C WITHOUT HEPATIC COMA (HCC): ICD-10-CM

## 2019-03-27 DIAGNOSIS — J06.9 VIRAL URI WITH COUGH: Primary | ICD-10-CM

## 2019-03-27 DIAGNOSIS — Z23 NEED FOR PROPHYLACTIC VACCINATION AND INOCULATION AGAINST VARICELLA: ICD-10-CM

## 2019-03-27 PROCEDURE — G8482 FLU IMMUNIZE ORDER/ADMIN: HCPCS | Performed by: HOSPITALIST

## 2019-03-27 PROCEDURE — G8417 CALC BMI ABV UP PARAM F/U: HCPCS | Performed by: HOSPITALIST

## 2019-03-27 PROCEDURE — 99213 OFFICE O/P EST LOW 20 MIN: CPT | Performed by: HOSPITALIST

## 2019-03-27 PROCEDURE — 99211 OFF/OP EST MAY X REQ PHY/QHP: CPT | Performed by: INTERNAL MEDICINE

## 2019-03-27 PROCEDURE — 4004F PT TOBACCO SCREEN RCVD TLK: CPT | Performed by: HOSPITALIST

## 2019-03-27 PROCEDURE — G8427 DOCREV CUR MEDS BY ELIG CLIN: HCPCS | Performed by: HOSPITALIST

## 2019-03-27 PROCEDURE — 3017F COLORECTAL CA SCREEN DOC REV: CPT | Performed by: HOSPITALIST

## 2019-03-27 RX ORDER — ECHINACEA PURPUREA EXTRACT 125 MG
1 TABLET ORAL PRN
Qty: 1 BOTTLE | Refills: 3 | Status: SHIPPED | OUTPATIENT
Start: 2019-03-27 | End: 2021-11-24

## 2019-03-27 RX ORDER — FLUTICASONE PROPIONATE 50 MCG
1 SPRAY, SUSPENSION (ML) NASAL DAILY
Qty: 2 BOTTLE | Refills: 1 | Status: SHIPPED | OUTPATIENT
Start: 2019-03-27 | End: 2021-11-24

## 2019-03-27 RX ORDER — FEXOFENADINE HCL 180 MG/1
180 TABLET ORAL DAILY
Qty: 30 TABLET | Refills: 0 | Status: CANCELLED | OUTPATIENT
Start: 2019-03-27 | End: 2019-04-26

## 2019-03-27 RX ORDER — GUAIFENESIN 400 MG/1
400 TABLET ORAL 4 TIMES DAILY PRN
Qty: 56 TABLET | Refills: 0 | Status: SHIPPED | OUTPATIENT
Start: 2019-03-27 | End: 2021-11-24

## 2019-03-27 RX ORDER — LOSARTAN POTASSIUM 50 MG/1
50 TABLET ORAL DAILY
Qty: 30 TABLET | Refills: 3 | Status: SHIPPED | OUTPATIENT
Start: 2019-03-27 | End: 2019-06-12 | Stop reason: ALTCHOICE

## 2019-03-27 ASSESSMENT — ENCOUNTER SYMPTOMS
CONSTIPATION: 0
CHEST TIGHTNESS: 0
RHINORRHEA: 1
WHEEZING: 0
TROUBLE SWALLOWING: 0
SORE THROAT: 0
VOICE CHANGE: 0
VOMITING: 0
EYE DISCHARGE: 0
COUGH: 0
SINUS PRESSURE: 1
EYE REDNESS: 1
DIARRHEA: 0
EYE PAIN: 0
ABDOMINAL PAIN: 0
NAUSEA: 0
SHORTNESS OF BREATH: 0
EYE ITCHING: 1

## 2019-03-27 ASSESSMENT — PATIENT HEALTH QUESTIONNAIRE - PHQ9
SUM OF ALL RESPONSES TO PHQ QUESTIONS 1-9: 0
1. LITTLE INTEREST OR PLEASURE IN DOING THINGS: 0
2. FEELING DOWN, DEPRESSED OR HOPELESS: 0
SUM OF ALL RESPONSES TO PHQ9 QUESTIONS 1 & 2: 0
SUM OF ALL RESPONSES TO PHQ QUESTIONS 1-9: 0

## 2019-05-28 ENCOUNTER — TELEPHONE (OUTPATIENT)
Dept: GASTROENTEROLOGY | Age: 64
End: 2019-05-28

## 2019-05-29 ENCOUNTER — HOSPITAL ENCOUNTER (OUTPATIENT)
Age: 64
Discharge: HOME | End: 2019-05-29
Payer: COMMERCIAL

## 2019-05-29 ENCOUNTER — OFFICE VISIT (OUTPATIENT)
Dept: GASTROENTEROLOGY | Age: 64
End: 2019-05-29
Payer: COMMERCIAL

## 2019-05-29 VITALS
BODY MASS INDEX: 27.52 KG/M2 | WEIGHT: 181 LBS | SYSTOLIC BLOOD PRESSURE: 139 MMHG | HEART RATE: 70 BPM | DIASTOLIC BLOOD PRESSURE: 82 MMHG

## 2019-05-29 DIAGNOSIS — B18.2 CHRONIC HEPATITIS C WITHOUT HEPATIC COMA (HCC): ICD-10-CM

## 2019-05-29 DIAGNOSIS — B18.2 CHRONIC HEPATITIS C WITHOUT HEPATIC COMA (HCC): Primary | ICD-10-CM

## 2019-05-29 DIAGNOSIS — R79.89 ELEVATED LFTS: ICD-10-CM

## 2019-05-29 LAB
FERRITIN: 123 UG/L (ref 30–400)
HEPATITIS B CORE TOTAL ANTIBODY: NONREACTIVE
IGG: 1472 MG/DL (ref 700–1600)
IGM: 393 MG/DL (ref 40–230)
INR BLD: 0.9
IRON SATURATION: 27 % (ref 20–55)
IRON: 92 UG/DL (ref 59–158)
PROTHROMBIN TIME: 10 SEC (ref 9–12)
TOTAL IRON BINDING CAPACITY: 337 UG/DL (ref 250–450)
UNSATURATED IRON BINDING CAPACITY: 245 UG/DL (ref 112–347)

## 2019-05-29 PROCEDURE — 82977 ASSAY OF GGT: CPT

## 2019-05-29 PROCEDURE — 83883 ASSAY NEPHELOMETRY NOT SPEC: CPT

## 2019-05-29 PROCEDURE — 82784 ASSAY IGA/IGD/IGG/IGM EACH: CPT

## 2019-05-29 PROCEDURE — 84450 TRANSFERASE (AST) (SGOT): CPT

## 2019-05-29 PROCEDURE — 99204 OFFICE O/P NEW MOD 45 MIN: CPT | Performed by: INTERNAL MEDICINE

## 2019-05-29 PROCEDURE — 82104 ALPHA-1-ANTITRYPSIN PHENO: CPT

## 2019-05-29 PROCEDURE — 82728 ASSAY OF FERRITIN: CPT

## 2019-05-29 PROCEDURE — 83550 IRON BINDING TEST: CPT

## 2019-05-29 PROCEDURE — 86038 ANTINUCLEAR ANTIBODIES: CPT

## 2019-05-29 PROCEDURE — 83540 ASSAY OF IRON: CPT

## 2019-05-29 PROCEDURE — G8417 CALC BMI ABV UP PARAM F/U: HCPCS | Performed by: INTERNAL MEDICINE

## 2019-05-29 PROCEDURE — 86704 HEP B CORE ANTIBODY TOTAL: CPT

## 2019-05-29 PROCEDURE — 83516 IMMUNOASSAY NONANTIBODY: CPT

## 2019-05-29 PROCEDURE — 36415 COLL VENOUS BLD VENIPUNCTURE: CPT

## 2019-05-29 PROCEDURE — 4004F PT TOBACCO SCREEN RCVD TLK: CPT | Performed by: INTERNAL MEDICINE

## 2019-05-29 PROCEDURE — 82103 ALPHA-1-ANTITRYPSIN TOTAL: CPT

## 2019-05-29 PROCEDURE — 84520 ASSAY OF UREA NITROGEN: CPT

## 2019-05-29 PROCEDURE — 85610 PROTHROMBIN TIME: CPT

## 2019-05-29 PROCEDURE — 86256 FLUORESCENT ANTIBODY TITER: CPT

## 2019-05-29 PROCEDURE — G8427 DOCREV CUR MEDS BY ELIG CLIN: HCPCS | Performed by: INTERNAL MEDICINE

## 2019-05-29 PROCEDURE — 3017F COLORECTAL CA SCREEN DOC REV: CPT | Performed by: INTERNAL MEDICINE

## 2019-05-29 PROCEDURE — 84460 ALANINE AMINO (ALT) (SGPT): CPT

## 2019-05-29 PROCEDURE — 86255 FLUORESCENT ANTIBODY SCREEN: CPT

## 2019-05-30 LAB
ANTI-NUCLEAR ANTIBODY (ANA): NEGATIVE
MITOCHONDRIAL ANTIBODY: 5.4 UNITS (ref 0–20)

## 2019-05-30 RX ORDER — POLYETHYLENE GLYCOL 3350 17 G/17G
POWDER, FOR SOLUTION ORAL
Qty: 255 G | Refills: 0 | Status: SHIPPED | OUTPATIENT
Start: 2019-05-30 | End: 2019-06-12 | Stop reason: ALTCHOICE

## 2019-05-31 LAB
ALANINE AMINOTRANSFERASE, FIBROMETER: 27 U/L (ref 5–50)
ALPHA-1 ANTITRYPSIN PHENOTYPE: NORMAL
ALPHA-1 ANTITRYPSIN: 157 MG/DL (ref 90–200)
ALPHA-2-MACROGLOBULIN, FIBROMETER: 476 MG/DL (ref 131–293)
ASPARTATE AMINOTRANSFERASE, FIBROMETER: 33 U/L (ref 9–50)
CIRRHOMETER PATIENT SCORE: 0.03
EER FIBROMETER REPORT: ABNORMAL
FIBROMETER INTERPRETATION: ABNORMAL
FIBROMETER PATIENT SCORE: 0.68
FIBROMETER PLATELET COUNT: 214
FIBROMETER PROTHROMBIN INDEX: 104 % (ref 90–120)
FIBROSIS METAVIR CLASSIFICATION: ABNORMAL
GAMMA GLUTAMYL TRANSFERASE, FIBROMETER: 100 U/L (ref 7–51)
INFLAMETER METAVIR CLASSIFICATION: ABNORMAL
INFLAMETER PATIENT SCORE: 0.56
SMOOTH MUSCLE ANTIBODY: NORMAL
UREA NITROGEN, FIBROMETER: 12 MG/DL (ref 7–20)

## 2019-06-04 NOTE — PROGRESS NOTES
INITIAL NOTE    HISTORY OF PRESENT ILLNESS: Mr. Leanne Herbert is a 62 y.o. male referred for evaluation of HCV. Elevated LFTs. No abd pain n/v/d. No blood in the stool or melena. Remote drug use. No prior HCV Tx. Past Medical, Family, and Social History reviewed and does not contribute to the patient presenting condition. Patient's PMH/PSH,SH,PSYCH Hx, MEDs, ALLERGIES, and ROS were all reviewed and updated in the appropriate sections.     PAST MEDICAL HISTORY:  Past Medical History:   Diagnosis Date    Alcohol abuse 11/3/2016    Chronic hepatitis C without hepatic coma ([facility] 3/27/2019    COPD, severity to be determined [facility] 8/17/2018    Headache 3/20/2017    Uncontrolled stage 2 hypertension 3/11/2016       Past Surgical History:   Procedure Laterality Date    NECK SURGERY  about 30 years ago    pt states he had a goiter removed       CURRENT MEDICATIONS:    Current Outpatient Medications:     zoster recombinant adjuvanted vaccine (SHINGRIX) 50 MCG/0.5ML SUSR injection, 50 MCG IM then repeat 2-6 months., Disp: 0.5 mL, Rfl: 1    fluticasone (FLONASE) 50 MCG/ACT nasal spray, 1 spray by Each Nare route daily 1 Spray in each nostril, Disp: 2 Bottle, Rfl: 1    sodium chloride (ALTAMIST SPRAY) 0.65 % nasal spray, 1 spray by Nasal route as needed for Congestion, Disp: 1 Bottle, Rfl: 3    losartan (COZAAR) 50 MG tablet, Take 1 tablet by mouth daily, Disp: 30 tablet, Rfl: 3    guaiFENesin 400 MG tablet, Take 1 tablet by mouth 4 times daily as needed for Cough, Disp: 56 tablet, Rfl: 0    amitriptyline (ELAVIL) 25 MG tablet, TAKE ONE TABLET BY MOUTH NIGHTLY, Disp: 30 tablet, Rfl: 3    famotidine (PEPCID) 20 MG tablet, TAKE 1 TABLET BY MOUTH 2 TIMES A DAY, Disp: 60 tablet, Rfl: 3    metFORMIN (GLUCOPHAGE) 500 MG tablet, TAKE 1 TABLET BY MOUTH 2 TIMES A DAY WITH MEALS, Disp: 60 tablet, Rfl: 3    albuterol sulfate  (90 Base) MCG/ACT inhaler, Inhale 2 puffs into the lungs every 6 hours as needed for Wheezing, Disp: 1 Inhaler, Rfl: 3    Tiotropium Bromide-Olodaterol (STIOLTO RESPIMAT) 2.5-2.5 MCG/ACT AERS, Inhale 2 puffs into the lungs daily, Disp: 2 Inhaler, Rfl: 2    Blood Pressure KIT, 1 kit by Does not apply route daily, Disp: 1 kit, Rfl: 0    aspirin (ASPIRIN ADULT LOW STRENGTH) 81 MG EC tablet, TAKE ONE TABLET BY MOUTH ONE TIME A DAY, Disp: 30 tablet, Rfl: 3    metoprolol tartrate (LOPRESSOR) 25 MG tablet, TAKE 1/2 TABLET TWICE A DAY, Disp: 60 tablet, Rfl: 3    butalbital-acetaminophen-caffeine (FIORICET, ESGIC) -40 MG per tablet, Take 1 tablet by mouth every 12 hours as needed for Headaches Will cause drowsiness. Do not drive if taking., Disp: 20 tablet, Rfl: 2    ibuprofen (ADVIL;MOTRIN) 800 MG tablet, Take 1 tablet by mouth every 6 hours as needed for Pain, Disp: 120 tablet, Rfl: 3    polyethylene glycol (MIRALAX) powder, Use as directed by following your bowel prep instructions given by physician office, Disp: 255 g, Rfl: 0    magnesium citrate solution, Take 296 mLs by mouth once for 1 dose, Disp: 296 mL, Rfl: 0    bisacodyl (BISACODYL) 5 MG EC tablet, TAKE 4 TABS THE DAY BEFORE COLONOSCOPY AS DIRECTED ON BOWEL PREP INSTRUCTIONS GIVEN BY PHYSICIAN OFFICE, Disp: 4 tablet, Rfl: 0    ALLERGIES:   No Known Allergies    FAMILY HISTORY:       Problem Relation Age of Onset    Heart Disease Mother     High Blood Pressure Mother     Stroke Father     High Blood Pressure Father     Cancer Sister     Heart Disease Sister      No GI pathology.     SOCIAL HISTORY:   Social History     Socioeconomic History    Marital status: Single     Spouse name: Not on file    Number of children: Not on file    Years of education: Not on file    Highest education level: Not on file   Occupational History    Not on file   Social Needs    Financial resource strain: Not on file    Food insecurity:     Worry: Not on file     Inability: Not on file    Transportation needs:     Medical: Not on file Non-medical: Not on file   Tobacco Use    Smoking status: Current Every Day Smoker     Packs/day: 0.75     Years: 40.00     Pack years: 30.00     Types: Cigarettes     Start date: 2/26/1977    Smokeless tobacco: Former User   Substance and Sexual Activity    Alcohol use: Yes     Alcohol/week: 8.4 oz     Types: 14 Cans of beer per week    Drug use: No    Sexual activity: Not Currently   Lifestyle    Physical activity:     Days per week: Not on file     Minutes per session: Not on file    Stress: Not on file   Relationships    Social connections:     Talks on phone: Not on file     Gets together: Not on file     Attends Yazidism service: Not on file     Active member of club or organization: Not on file     Attends meetings of clubs or organizations: Not on file     Relationship status: Not on file    Intimate partner violence:     Fear of current or ex partner: Not on file     Emotionally abused: Not on file     Physically abused: Not on file     Forced sexual activity: Not on file   Other Topics Concern    Not on file   Social History Narrative    Not on file       REVIEW OF SYSTEMS: A 12-point review of systems was obtained and pertinent positives and negatives were enumerated above in the history of present illness. All other reviewed systems / symptoms were negative. Review of Systems    PHYSICAL EXAMINATION: Vital signs reviewed per the nursing documentation. /82   Pulse 70   Wt 181 lb (82.1 kg)   BMI 27.52 kg/m²   Body mass index is 27.52 kg/m². I personally reviewed the nurse's notes and documentation and I agree with her notes. General: alert, appears stated age and cooperative Psych: Normal. and Alert and oriented, appropriate affect. . Normal affect. Mentation normal  HEENT: PERRLA. Clear conjunctivae and sclerae. Moist oral mucosae, no lesions or ulcers. The neck is supple, without lymphadenopathy or jugular venous distension. No masses. Normal thyroid.

## 2019-06-06 DIAGNOSIS — G89.29 CHRONIC INTRACTABLE HEADACHE, UNSPECIFIED HEADACHE TYPE: ICD-10-CM

## 2019-06-06 DIAGNOSIS — I10 HYPERTENSION, WELL CONTROLLED: ICD-10-CM

## 2019-06-06 DIAGNOSIS — R51.9 CHRONIC INTRACTABLE HEADACHE, UNSPECIFIED HEADACHE TYPE: ICD-10-CM

## 2019-06-06 DIAGNOSIS — M25.512 ACUTE PAIN OF LEFT SHOULDER: ICD-10-CM

## 2019-06-06 NOTE — TELEPHONE ENCOUNTER
E-scribe request for Ibuprofen & Fioricet. Please review and e-scribe if applicable. Next Visit Date:  6/12/2019    Health Maintenance   Topic Date Due    Hepatitis A vaccine (1 of 2 - Risk 2-dose series) 01[birth year]15/1957    Hepatitis B Vaccine (1 of 3 - Risk 3-dose series) 01/15/1975    Shingles Vaccine (1 of 2) 01/15/2006    Potassium monitoring  12/01/2018    Creatinine monitoring  12/01/2018    Colon Cancer Screen FIT/FOBT  03/02/2019    A1C test (Diabetic or Prediabetic)  05/25/2019    Low dose CT lung screening  09/12/2019    Lipid screen  12/01/2022    DTaP/Tdap/Td vaccine (2 - Td) 02/26/2026    Flu vaccine  Completed    Pneumococcal 0-64 years Vaccine  Completed    HIV screen  Completed             (applicable per patient's age: Cancer Screenings, Depression Screening, Fall Risk Screening, Immunizations)    Hemoglobin A1C (%)   Date Value   05/25/2018 6.0   12/01/2017 6.3 (H)   09/01/2017 6.1 (H)     Microalb/Crt.  Ratio (mcg/mg creat)   Date Value   08/29/2018 16     LDL Cholesterol (mg/dL)   Date Value   12/01/2017 61     AST (U/L)   Date Value   08/29/2018 41 (H)     ALT (U/L)   Date Value   08/29/2018 48 (H)     BUN (mg/dL)   Date Value   12/01/2017 12      (goal A1C is < 7)   (goal LDL is <100) need 30-50% reduction from baseline     BP Readings from Last 3 Encounters:   05/29/19 139/82   03/27/19 (!) 152/82   12/26/18 (!) 143/92    (goal /80)      All Future Testing planned in [facility]:  Lab Frequency Next Occurrence   APTT Once 41/99/5651   Basic Metabolic Panel Once 43/41/7054   Lipid Panel Once 08/06/2019   Comprehensive Metabolic Panel Once 29/41/0911   CBC With Auto Differential Once 07/04/2019       Next Visit Date:  Future Appointments   Date Time Provider Cy Quinn   6/12/2019 10:50 AM Geno Schwab, MD [facility]            Patient Active Problem List:     Essential hypertension     Cigarette smoker     Pneumonia of right lower lobe due to infectious organism

## 2019-06-06 NOTE — TELEPHONE ENCOUNTER
infectious organism [facility])     Chronic coughing     Alcohol abuse     Headache     Intractable migraine without status migrainosus     Ataxia     Leukocytosis     COPD, severity to be determined ([facility] [address]     Chronic hepatitis C without hepatic coma (HCC)     Restrictive lung disease     Prediabetes

## 2019-06-07 RX ORDER — ASPIRIN 81 MG/1
TABLET ORAL
Qty: 30 TABLET | Refills: 3 | Status: SHIPPED | OUTPATIENT
Start: 2019-06-07 | End: 2019-10-24 | Stop reason: SDUPTHER

## 2019-06-07 RX ORDER — BUTALBITAL, ACETAMINOPHEN AND CAFFEINE 50; 325; 40 MG/1; MG/1; MG/1
1 TABLET ORAL EVERY 12 HOURS PRN
Qty: 20 TABLET | Refills: 0 | Status: SHIPPED | OUTPATIENT
Start: 2019-06-07 | End: 2019-09-17 | Stop reason: SDUPTHER

## 2019-06-07 RX ORDER — BUTALBITAL, ACETAMINOPHEN AND CAFFEINE 50; 325; 40 MG/1; MG/1; MG/1
TABLET ORAL
Qty: 30 TABLET | Refills: 2 | OUTPATIENT
Start: 2019-06-07

## 2019-06-07 RX ORDER — IBUPROFEN 800 MG/1
TABLET ORAL
Qty: 120 TABLET | Refills: 3 | Status: SHIPPED | OUTPATIENT
Start: 2019-06-07 | End: 2020-04-16 | Stop reason: SDUPTHER

## 2019-06-12 ENCOUNTER — OFFICE VISIT (OUTPATIENT)
Dept: INTERNAL MEDICINE | Age: 64
End: 2019-06-12
Payer: COMMERCIAL

## 2019-06-12 ENCOUNTER — HOSPITAL ENCOUNTER (OUTPATIENT)
Age: 64
Setting detail: SPECIMEN
Discharge: HOME | End: 2019-06-12
Payer: COMMERCIAL

## 2019-06-12 VITALS
DIASTOLIC BLOOD PRESSURE: 82 MMHG | HEART RATE: 68 BPM | HEIGHT: 68 IN | SYSTOLIC BLOOD PRESSURE: 160 MMHG | BODY MASS INDEX: 27.28 KG/M2 | WEIGHT: 180 LBS

## 2019-06-12 DIAGNOSIS — B18.2 CHRONIC HEPATITIS C WITHOUT HEPATIC COMA (HCC): ICD-10-CM

## 2019-06-12 DIAGNOSIS — Z72.0 TOBACCO ABUSE: ICD-10-CM

## 2019-06-12 DIAGNOSIS — K74.60 CIRRHOSIS OF LIVER WITHOUT ASCITES, UNSPECIFIED HEPATIC CIRRHOSIS TYPE (HCC): ICD-10-CM

## 2019-06-12 DIAGNOSIS — I10 ESSENTIAL HYPERTENSION: ICD-10-CM

## 2019-06-12 DIAGNOSIS — J44.9 CHRONIC OBSTRUCTIVE PULMONARY DISEASE, UNSPECIFIED COPD TYPE (HCC): Primary | ICD-10-CM

## 2019-06-12 DIAGNOSIS — R73.03 PREDIABETES: ICD-10-CM

## 2019-06-12 DIAGNOSIS — Z12.11 COLON CANCER SCREENING: ICD-10-CM

## 2019-06-12 DIAGNOSIS — J44.9 CHRONIC OBSTRUCTIVE PULMONARY DISEASE, UNSPECIFIED COPD TYPE (HCC): ICD-10-CM

## 2019-06-12 DIAGNOSIS — R06.02 SOB (SHORTNESS OF BREATH) ON EXERTION: ICD-10-CM

## 2019-06-12 LAB
ALBUMIN SERPL-MCNC: 4.2 G/DL (ref 3.5–5.2)
ALBUMIN/GLOBULIN RATIO: 1.1 (ref 1–2.5)
ALP BLD-CCNC: 95 U/L (ref 40–129)
ALT SERPL-CCNC: 27 U/L (ref 5–41)
ANION GAP SERPL CALCULATED.3IONS-SCNC: 16 MMOL/L (ref 9–17)
AST SERPL-CCNC: 23 U/L
BILIRUB SERPL-MCNC: 0.33 MG/DL (ref 0.3–1.2)
BUN BLDV-MCNC: 11 MG/DL (ref 8–23)
BUN/CREAT BLD: NORMAL (ref 9–20)
CALCIUM SERPL-MCNC: 10 MG/DL (ref 8.6–10.4)
CHLORIDE BLD-SCNC: 106 MMOL/L (ref 98–107)
CO2: 21 MMOL/L (ref 20–31)
CREAT SERPL-MCNC: 0.88 MG/DL (ref 0.7–1.2)
GFR AFRICAN AMERICAN: >60 ML/MIN
GFR NON-AFRICAN AMERICAN: >60 ML/MIN
GFR SERPL CREATININE-BSD FRML MDRD: NORMAL ML/MIN/{1.73_M2}
GFR SERPL CREATININE-BSD FRML MDRD: NORMAL ML/MIN/{1.73_M2}
GLUCOSE BLD-MCNC: 89 MG/DL (ref 70–99)
HBA1C MFR BLD: 5.4 %
HCT VFR BLD CALC: 44.7 % (ref 40.7–50.3)
HEMOGLOBIN: 13.8 G/DL (ref 13–17)
MCH RBC QN AUTO: 31.2 PG (ref 25.2–33.5)
MCHC RBC AUTO-ENTMCNC: 30.9 G/DL (ref 28.4–34.8)
MCV RBC AUTO: 100.9 FL (ref 82.6–102.9)
NRBC AUTOMATED: 0 PER 100 WBC
PDW BLD-RTO: 13.7 % (ref 11.8–14.4)
PLATELET # BLD: 228 K/UL (ref 138–453)
PMV BLD AUTO: 10.7 FL (ref 8.1–13.5)
POTASSIUM SERPL-SCNC: 4.5 MMOL/L (ref 3.7–5.3)
RBC # BLD: 4.43 M/UL (ref 4.21–5.77)
SODIUM BLD-SCNC: 143 MMOL/L (ref 135–144)
TOTAL PROTEIN: 8 G/DL (ref 6.4–8.3)
WBC # BLD: 9.5 K/UL (ref 3.5–11.3)

## 2019-06-12 PROCEDURE — 80053 COMPREHEN METABOLIC PANEL: CPT

## 2019-06-12 PROCEDURE — 36415 COLL VENOUS BLD VENIPUNCTURE: CPT

## 2019-06-12 PROCEDURE — 83036 HEMOGLOBIN GLYCOSYLATED A1C: CPT | Performed by: HOSPITALIST

## 2019-06-12 PROCEDURE — G8417 CALC BMI ABV UP PARAM F/U: HCPCS | Performed by: HOSPITALIST

## 2019-06-12 PROCEDURE — G8926 SPIRO NO PERF OR DOC: HCPCS | Performed by: HOSPITALIST

## 2019-06-12 PROCEDURE — 4004F PT TOBACCO SCREEN RCVD TLK: CPT | Performed by: HOSPITALIST

## 2019-06-12 PROCEDURE — 85027 COMPLETE CBC AUTOMATED: CPT

## 2019-06-12 PROCEDURE — G8427 DOCREV CUR MEDS BY ELIG CLIN: HCPCS | Performed by: HOSPITALIST

## 2019-06-12 PROCEDURE — 99211 OFF/OP EST MAY X REQ PHY/QHP: CPT | Performed by: INTERNAL MEDICINE

## 2019-06-12 PROCEDURE — 3017F COLORECTAL CA SCREEN DOC REV: CPT | Performed by: HOSPITALIST

## 2019-06-12 PROCEDURE — 99214 OFFICE O/P EST MOD 30 MIN: CPT | Performed by: HOSPITALIST

## 2019-06-12 PROCEDURE — 3023F SPIROM DOC REV: CPT | Performed by: HOSPITALIST

## 2019-06-12 RX ORDER — ALBUTEROL SULFATE 90 UG/1
2 AEROSOL, METERED RESPIRATORY (INHALATION) EVERY 6 HOURS PRN
Qty: 1 INHALER | Refills: 3 | Status: SHIPPED | OUTPATIENT
Start: 2019-06-12 | End: 2020-11-20

## 2019-06-12 RX ORDER — LOSARTAN POTASSIUM AND HYDROCHLOROTHIAZIDE 25; 100 MG/1; MG/1
1 TABLET ORAL DAILY
Qty: 90 TABLET | Refills: 1 | Status: SHIPPED | OUTPATIENT
Start: 2019-06-12 | End: 2019-07-17 | Stop reason: SDUPTHER

## 2019-06-12 ASSESSMENT — ENCOUNTER SYMPTOMS
COUGH: 0
ABDOMINAL PAIN: 0
EYE REDNESS: 0
EYE PAIN: 0
VOMITING: 0
DIARRHEA: 0
CHEST TIGHTNESS: 0
SORE THROAT: 0
NAUSEA: 0
RHINORRHEA: 0
EYE DISCHARGE: 0
VOICE CHANGE: 0
CONSTIPATION: 0
WHEEZING: 0
SHORTNESS OF BREATH: 1

## 2019-06-12 NOTE — PATIENT INSTRUCTIONS
Your doctor has ordered blood or urine testing. You can get this testing done at the Lab located on the first floor of the [facility], or at any other [facility]. Please stop at Main Registration, before going to the lab, as you must be registered first.     Please get this lab done before your next appointment          Return appointment card and Summary of Care was reviewed and copy was given to the patient.   DEMETRIO

## 2019-06-12 NOTE — PROGRESS NOTES
HYPERTENSION visit     BP Readings from Last 3 Encounters:   05/29/19 139/82   03/27/19 (!) 152/82   12/26/18 (!) 143/92       LDL Cholesterol (mg/dL)   Date Value   12/01/2017 61     HDL (mg/dL)   Date Value   12/01/2017 57     BUN (mg/dL)   Date Value   12/01/2017 12     CREATININE (mg/dL)   Date Value   12/01/2017 0.75     Glucose (mg/dL)   Date Value   12/01/2017 135 (H)              Have you changed or started any medications since your last visit including any over-the-counter medicines, vitamins, or herbal medicines? no   Have you stopped taking any of your medications? Is so, why? -  yes - Ran out of his inhalers  Are you having any side effects from any of your medications? - no  How often do you miss doses of your medication? no      Have you seen any other physician or provider since your last visit? yes - Gastro   Have you had any other diagnostic tests since your last visit? yes - Labs   Have you been seen in the emergency room and/or had an admission in a hospital since we last saw you?  no   Have you had your routine dental cleaning in the past 6 months?  no     Do you have an active MyChart account? If no, what is the barrier?   Yes    Patient Care Team:  Marquis Riley MD as PCP - General (Internal Medicine)  Cortland Meckel, MD [facility]  Chris Spear MD as Consulting Physician (Pulmonology)  Aj Turk RN as Nurse Navigator    Medical History Review  Past Medical, Family, and Social History reviewed and does contribute to the patient presenting condition    Health Maintenance   Topic Date Due    Hepatitis A vaccine (1 of 2 - Risk 2-dose series) [birth year]/1957    Hepatitis B Vaccine (1 of 3 - Risk 3-dose series) 01/15/1975    Shingles Vaccine (1 of 2) 01/15/2006    Potassium monitoring  12/01/2018    Creatinine monitoring  12/01/2018    Colon Cancer Screen FIT/FOBT  03/02/2019    A1C test (Diabetic or Prediabetic)  05/25/2019    Low dose CT lung screening  09/12/2019  Lipid screen  12/01/2022    DTaP/Tdap/Td vaccine (2 - Td) 02/26/2026    Flu vaccine  Completed    Pneumococcal 0-64 years Vaccine  Completed    HIV screen  Completed

## 2019-06-20 ENCOUNTER — TELEPHONE (OUTPATIENT)
Dept: GASTROENTEROLOGY | Age: 64
End: 2019-06-20

## 2019-06-20 NOTE — TELEPHONE ENCOUNTER
Writer spoke with patient and confirmed colon scheduled Wed 6/26/19 at [facility], understanding of bowel prep, and arrival time. Patient reported that he is having difficulty finding someone to come with him and is planning to travel by cab. Writer informed patient to call the office by Monday 6/24 if he is unable to find someone to come with him and the procedure will then be rescheduled for a later date. Patient expressed understanding.

## 2019-06-24 NOTE — TELEPHONE ENCOUNTER
Patient called stating that he currently has a toothache and has been taking ibuprofen. Patient also going to try and see his physician to get on some antibiotics. Patient rescheduled colon for 08/07/19 at [facility] at 3:30pm. Patient declined new prep instructions.

## 2019-07-11 DIAGNOSIS — K21.9 GASTROESOPHAGEAL REFLUX DISEASE WITHOUT ESOPHAGITIS: ICD-10-CM

## 2019-07-11 DIAGNOSIS — I10 ESSENTIAL HYPERTENSION: ICD-10-CM

## 2019-07-11 DIAGNOSIS — G89.29 CHRONIC INTRACTABLE HEADACHE, UNSPECIFIED HEADACHE TYPE: ICD-10-CM

## 2019-07-11 DIAGNOSIS — R51.9 CHRONIC INTRACTABLE HEADACHE, UNSPECIFIED HEADACHE TYPE: ICD-10-CM

## 2019-07-11 DIAGNOSIS — I10 HYPERTENSION, WELL CONTROLLED: ICD-10-CM

## 2019-07-11 DIAGNOSIS — R73.03 PRE-DIABETES: ICD-10-CM

## 2019-07-11 NOTE — TELEPHONE ENCOUNTER
Refill request for pended medications. If appropriate please send medication(s) to patients pharmacy. Next appt: None currently. Note to pharmacy to have patient contact the office to schedule appointment. Last seen : 06/12/2019      Health Maintenance   Topic Date Due    Shingles Vaccine (1 of 2) 01/15/2006    Colon Cancer Screen FIT/FOBT  03/02/2019    Hepatitis A vaccine (1 of 2 - Risk 2-dose series) 06/12/2020 (Originally 1/15/1956)    Hepatitis B Vaccine (1 of 3 - Risk 3-dose series) 06/12/2020 (Originally 1/15/1975)    Flu vaccine (1) 09/01/2019    Low dose CT lung screening  09/12/2019    A1C test (Diabetic or Prediabetic)  06/12/2020    Potassium monitoring  06/12/2020    Creatinine monitoring  06/12/2020    Lipid screen  12/01/2022    DTaP/Tdap/Td vaccine (2 - Td) 02/26/2026    Pneumococcal 0-64 years Vaccine  Completed    HIV screen  Completed       Hemoglobin A1C (%)   Date Value   06/12/2019 5.4   05/25/2018 6.0   12/01/2017 6.3 (H)             ( goal A1C is < 7)   Microalb/Crt.  Ratio (mcg/mg creat)   Date Value   08/29/2018 16     LDL Cholesterol (mg/dL)   Date Value   12/01/2017 61       (goal LDL is <100)   AST (U/L)   Date Value   06/12/2019 23     ALT (U/L)   Date Value   06/12/2019 27     BUN (mg/dL)   Date Value   06/12/2019 11     BP Readings from Last 3 Encounters:   06/12/19 (!) 160/82   05/29/19 139/82   03/27/19 (!) 152/82          (goal 120/80)          Patient Active Problem List:     Essential hypertension     Cigarette smoker     Pneumonia of right lower lobe due to infectious organism ([address]     Chronic coughing     Alcohol abuse     Headache     Intractable migraine without status migrainosus     Ataxia     Leukocytosis     COPD, severity to be determined ([address]     Chronic hepatitis C without hepatic coma (HCC)     Restrictive lung disease     Prediabetes

## 2019-07-17 ENCOUNTER — NURSE ONLY (OUTPATIENT)
Dept: INTERNAL MEDICINE | Age: 64
End: 2019-07-17
Payer: COMMERCIAL

## 2019-07-17 VITALS — HEART RATE: 78 BPM | DIASTOLIC BLOOD PRESSURE: 74 MMHG | SYSTOLIC BLOOD PRESSURE: 132 MMHG

## 2019-07-17 DIAGNOSIS — I10 ESSENTIAL HYPERTENSION: ICD-10-CM

## 2019-07-17 PROCEDURE — 99211 OFF/OP EST MAY X REQ PHY/QHP: CPT | Performed by: INTERNAL MEDICINE

## 2019-07-17 RX ORDER — FAMOTIDINE 20 MG/1
TABLET, FILM COATED ORAL
Qty: 60 TABLET | Refills: 1 | Status: SHIPPED | OUTPATIENT
Start: 2019-07-17 | End: 2019-09-23 | Stop reason: SDUPTHER

## 2019-07-17 RX ORDER — AMITRIPTYLINE HYDROCHLORIDE 25 MG/1
TABLET, FILM COATED ORAL
Qty: 30 TABLET | Refills: 1 | Status: SHIPPED | OUTPATIENT
Start: 2019-07-17 | End: 2019-09-23 | Stop reason: SDUPTHER

## 2019-07-17 RX ORDER — LOSARTAN POTASSIUM AND HYDROCHLOROTHIAZIDE 25; 100 MG/1; MG/1
1 TABLET ORAL DAILY
Qty: 90 TABLET | Refills: 1 | Status: SHIPPED | OUTPATIENT
Start: 2019-07-17 | End: 2020-04-21

## 2019-08-01 ENCOUNTER — TELEPHONE (OUTPATIENT)
Dept: GASTROENTEROLOGY | Age: 64
End: 2019-08-01

## 2019-08-01 NOTE — TELEPHONE ENCOUNTER
LM for patient to call the office to confirm colon scheduled Wed 8/7/19 at [facility], understanding of bowel prep instructions, arrival time and that patient has a .

## 2019-09-05 ENCOUNTER — OFFICE VISIT (OUTPATIENT)
Dept: INTERNAL MEDICINE | Age: 64
End: 2019-09-05
Payer: COMMERCIAL

## 2019-09-05 VITALS
DIASTOLIC BLOOD PRESSURE: 76 MMHG | HEART RATE: 76 BPM | BODY MASS INDEX: 26.01 KG/M2 | WEIGHT: 171.6 LBS | HEIGHT: 68 IN | SYSTOLIC BLOOD PRESSURE: 124 MMHG

## 2019-09-05 DIAGNOSIS — R73.03 PRE-DIABETES: ICD-10-CM

## 2019-09-05 DIAGNOSIS — H04.123 DRY EYES, BILATERAL: ICD-10-CM

## 2019-09-05 DIAGNOSIS — K74.60 HEPATIC CIRRHOSIS, UNSPECIFIED HEPATIC CIRRHOSIS TYPE, UNSPECIFIED WHETHER ASCITES PRESENT (HCC): ICD-10-CM

## 2019-09-05 DIAGNOSIS — B18.2 CHRONIC HEPATITIS C WITHOUT HEPATIC COMA (HCC): ICD-10-CM

## 2019-09-05 DIAGNOSIS — I10 ESSENTIAL HYPERTENSION: Primary | ICD-10-CM

## 2019-09-05 DIAGNOSIS — Z72.0 TOBACCO ABUSE: ICD-10-CM

## 2019-09-05 DIAGNOSIS — J44.9 CHRONIC OBSTRUCTIVE PULMONARY DISEASE, UNSPECIFIED COPD TYPE (HCC): ICD-10-CM

## 2019-09-05 DIAGNOSIS — Z12.11 COLON CANCER SCREENING: ICD-10-CM

## 2019-09-05 PROCEDURE — 4004F PT TOBACCO SCREEN RCVD TLK: CPT | Performed by: STUDENT IN AN ORGANIZED HEALTH CARE EDUCATION/TRAINING PROGRAM

## 2019-09-05 PROCEDURE — G8926 SPIRO NO PERF OR DOC: HCPCS | Performed by: STUDENT IN AN ORGANIZED HEALTH CARE EDUCATION/TRAINING PROGRAM

## 2019-09-05 PROCEDURE — G8427 DOCREV CUR MEDS BY ELIG CLIN: HCPCS | Performed by: STUDENT IN AN ORGANIZED HEALTH CARE EDUCATION/TRAINING PROGRAM

## 2019-09-05 PROCEDURE — 99211 OFF/OP EST MAY X REQ PHY/QHP: CPT | Performed by: INTERNAL MEDICINE

## 2019-09-05 PROCEDURE — 99213 OFFICE O/P EST LOW 20 MIN: CPT | Performed by: STUDENT IN AN ORGANIZED HEALTH CARE EDUCATION/TRAINING PROGRAM

## 2019-09-05 PROCEDURE — 3017F COLORECTAL CA SCREEN DOC REV: CPT | Performed by: STUDENT IN AN ORGANIZED HEALTH CARE EDUCATION/TRAINING PROGRAM

## 2019-09-05 PROCEDURE — 3023F SPIROM DOC REV: CPT | Performed by: STUDENT IN AN ORGANIZED HEALTH CARE EDUCATION/TRAINING PROGRAM

## 2019-09-05 PROCEDURE — G8417 CALC BMI ABV UP PARAM F/U: HCPCS | Performed by: STUDENT IN AN ORGANIZED HEALTH CARE EDUCATION/TRAINING PROGRAM

## 2019-09-05 NOTE — PROGRESS NOTES
migraine without status migrainosus    Ataxia    Leukocytosis    COPD, severity to be determined ([facility] [address]    Chronic hepatitis C without hepatic coma (HCC)    Restrictive lung disease    Prediabetes         Health Maintenance Due   Topic Date Due    Shingles Vaccine (1 of 2) 01/15/2006    Colon Cancer Screen FIT/FOBT  03/02/2019    Flu vaccine (1) 09/01/2019         No Known Allergies      MEDICATIONS:      Current Outpatient Medications   Medication Sig Dispense Refill    metFORMIN (GLUCOPHAGE) 500 MG tablet TAKE 1 TABLET BY MOUTH 2 TIMES A DAY WITH MEALS 60 tablet 1    famotidine (PEPCID) 20 MG tablet TAKE 1 TABLET BY MOUTH 2 TIMES A DAY 60 tablet 1    metoprolol tartrate (LOPRESSOR) 25 MG tablet TAKE 1/2 TABLET 2 TIMES A DAY 60 tablet 1    amitriptyline (ELAVIL) 25 MG tablet TAKE ONE TABLET BY MOUTH EVERY EVENING 30 tablet 1    losartan-hydrochlorothiazide (HYZAAR) 100-25 MG per tablet Take 1 tablet by mouth daily 90 tablet 1    albuterol sulfate  (90 Base) MCG/ACT inhaler Inhale 2 puffs into the lungs every 6 hours as needed for Wheezing 1 Inhaler 3    Tiotropium Bromide-Olodaterol (STIOLTO RESPIMAT) 2.5-2.5 MCG/ACT AERS Inhale 2 puffs into the lungs daily 2 Inhaler 2    ibuprofen (ADVIL;MOTRIN) 800 MG tablet TAKE ONE TABLET BY MOUTH EVERY 6 HOURS AS NEEDED FOR PAIN 120 tablet 3    aspirin (ASPIRIN ADULT LOW STRENGTH) 81 MG EC tablet TAKE 1 TABLET BY MOUTH ONE TIME A DAY 30 tablet 3    butalbital-acetaminophen-caffeine (FIORICET, ESGIC) -40 MG per tablet TAKE 1 TABLET BY MOUTH EVERY 12 HOURS AS NEEDED FOR HEADACHES. WILL CAUSE DROWSINESS. DO NOT DRIVE IF TAKING 20 tablet 0    zoster recombinant adjuvanted vaccine (SHINGRIX) 50 MCG/0.5ML SUSR injection 50 MCG IM then repeat 2-6 months.  0.5 mL 1    fluticasone (FLONASE) 50 MCG/ACT nasal spray 1 spray by Each Nare route daily 1 Spray in each nostril 2 Bottle 1    sodium chloride (ALTAMIST SPRAY) 0.65 % nasal spray 1 spray by Nasal route as needed for Congestion 1 Bottle 3    guaiFENesin 400 MG tablet Take 1 tablet by mouth 4 times daily as needed for Cough 56 tablet 0    Blood Pressure KIT 1 kit by Does not apply route daily 1 kit 0     No current facility-administered medications for this visit. SOCIAL HISTORY    Reviewed and no change from previous record. Paul Ortez  reports that he has been smoking cigarettes. He started smoking about 42 years ago. He has a 30.00 pack-year smoking history. He has quit using smokeless tobacco.    FAMILY HISTORY:    Reviewed and No change from previous visit    REVIEW OF SYSTEMS:    CONSTITUTIONAL: Denies: fever, chills. Positive for dryness in eyes bilaterally  PSYCH: Denies: anxiety, depression  ALLERGIES: Denies: urticaria  EYES: Denies: blurry vision, decreased vision, photophobia  ENT: Denies: sore throat, nasal congestion  CARDIOVASCULAR: Denies: chest pain, dyspnea on exertion  RESPIRATORY: Denies: cough, hemoptysis, shortness of breath  GI: Denies: Denies: abdominal pain, flank pain  : Denies: Denies: dysuria, frequency/urgency  NEURO: Denies: dizzy/vertigo, headache  MUSCULOSKELETAL: Denies: back pain, joint pain  SKIN: Denies: rash, itching    PHYSICAL EXAM:      Vitals:    09/05/19 1327   BP: 124/76   Site: Left Upper Arm   Position: Sitting   Cuff Size: Medium Adult   Pulse: 76   Weight: 171 lb 9.6 oz (77.8 kg)   Height: 5' 8\" (1.727 m)     BP Readings from Last 3 Encounters:   09/05/19 124/76   07/17/19 132/74   06/12/19 (!) 160/82      General appearance - alert, well appearing, and in no distress  Mental status - alert, oriented to person, place, and time  Mouth - mucous membranes moist, pharynx normal without lesions  Chest - clear to auscultation, no wheezes, rales or rhonchi, symmetric air entry  Heart - normal rate, regular rhythm, normal S1, S2, no murmurs, rubs, clicks or gallops  Abdomen - soft, nontender but distended. No organomegaly.   Back exam - full range of motion, no tenderness, palpable spasm or pain on motion  Neurological - alert, oriented, normal speech, no focal findings or movement disorder noted  Musculoskeletal - no joint tenderness, deformity or swelling  Extremities - peripheral pulses normal, no pedal edema, no clubbing or cyanosis  Skin - normal coloration and turgor, no rashes, no suspicious skin lesions noted    LABORATORY FINDINGS:    CBC:  Lab Results   Component Value Date    WBC 9.5 06/12/2019    HGB 13.8 06/12/2019     06/12/2019       BMP:    Lab Results   Component Value Date     06/12/2019    K 4.5 06/12/2019     06/12/2019    CO2 21 06/12/2019    BUN 11 06/12/2019    CREATININE 0.88 06/12/2019    GLUCOSE 89 06/12/2019       HEMOGLOBIN A1C:   Lab Results   Component Value Date    LABA1C 5.4 06/12/2019       FASTING LIPID PANEL:  Lab Results   Component Value Date    CHOL 139 12/01/2017    HDL 57 12/01/2017    TRIG 104 12/01/2017       ASSESSMENT AND PLAN:    Ward Al was seen today for established new doctor, health maintenance and hypertension. Diagnoses and all orders for this visit:    Essential hypertension  Continue losartan hydrochlorothiazide combination with Lopressor 25 mg tablet. Blood pressure is in good control. Pre-diabetes  Last hemoglobin A1c 5.4. Continue metformin 500 mg. Chronic obstructive pulmonary disease, unspecified COPD type ([address]  Continue albuterol inhaler as needed along with Stiolto Respimat    Cirrhosis of liver   Recommended the patient to follow-up with gastroenterology. The patient could not follow-up with gastroenterology previously because of lack of transport. Patient understands the discussion and is willing to follow-up at [facility] because that is convenient for him. Chronic hepatitis C without hepatic coma [facility])  Follow-up with gastroenterology as outpatient for treatment for hepatitis C. Colon cancer screening  Patient is agreeable to Cologuard at this time.   Procedure discussed with the patient. The patient will consider colonoscopy based on the Cologuard results. Tobacco abuse  Recommended the patient to quit smoking. Continue inhalers for COPD. Dry eyes, bilateral  Artificial tears to both eyes as needed. FOLLOW UP AND INSTRUCTIONS:   · Follow-up in 3 months    · Kim Deleon received counseling on the following healthy behaviors: nutrition, exercise, medication adherence, tobacco cessation and decrease in alcohol consumption    · Discussed use, benefit, and side effects of prescribed medications. Barriers to medication compliance addressed. All patient questions answered. Pt voiced understanding.      · Patient given educational materials - see patient instructions    Cruzito Rudolph MD  PGY-2, Department of Internal Medicine  [address] [facility]  9/5/2019 1:48 PM

## 2019-09-09 ENCOUNTER — TELEPHONE (OUTPATIENT)
Dept: ONCOLOGY | Age: 64
End: 2019-09-09

## 2019-09-09 DIAGNOSIS — Z87.891 PERSONAL HISTORY OF NICOTINE DEPENDENCE: ICD-10-CM

## 2019-09-09 DIAGNOSIS — H35.52: Primary | ICD-10-CM

## 2019-09-17 DIAGNOSIS — R51.9 CHRONIC INTRACTABLE HEADACHE, UNSPECIFIED HEADACHE TYPE: ICD-10-CM

## 2019-09-17 DIAGNOSIS — G89.29 CHRONIC INTRACTABLE HEADACHE, UNSPECIFIED HEADACHE TYPE: ICD-10-CM

## 2019-09-18 ENCOUNTER — TELEPHONE (OUTPATIENT)
Dept: ONCOLOGY | Age: 64
End: 2019-09-18

## 2019-09-18 RX ORDER — BUTALBITAL, ACETAMINOPHEN AND CAFFEINE 50; 325; 40 MG/1; MG/1; MG/1
TABLET ORAL
Qty: 20 TABLET | Refills: 0 | Status: SHIPPED | OUTPATIENT
Start: 2019-09-18 | End: 2019-12-02 | Stop reason: SDUPTHER

## 2019-09-18 NOTE — TELEPHONE ENCOUNTER
Request for Fioricet. Next Visit Date:  Future Appointments   Date Time Provider Cy Andrewi   9/24/2019  2:00 PM STV CT  [facility] CT SCAN [facility] Radiolog   10/30/2019  9:00 AM Martell Castañeda MD [address] Maintenance   Topic Date Due    Shingles Vaccine (1 of 2) 01/15/2006    Colon Cancer Screen FIT/FOBT  03/02/2019    Flu vaccine (1) 09/01/2019    Low dose CT lung screening  09/12/2019    Hepatitis A vaccine (1 of 2 - Risk 2-dose series) 06/12/2020 (Originally 1/15/1956)    Hepatitis B Vaccine (1 of 3 - Risk 3-dose series) 06/12/2020 (Originally 1/15/1975)    A1C test (Diabetic or Prediabetic)  06/12/2020    Potassium monitoring  06/12/2020    Creatinine monitoring  06/12/2020    Lipid screen  12/01/2022    DTaP/Tdap/Td vaccine (2 - Td) 02/26/2026    Pneumococcal 0-64 years Vaccine  Completed    HIV screen  Completed       Hemoglobin A1C (%)   Date Value   06/12/2019 5.4   05/25/2018 6.0   12/01/2017 6.3 (H)             ( goal A1C is < 7)   Microalb/Crt.  Ratio (mcg/mg creat)   Date Value   08/29/2018 16     LDL Cholesterol (mg/dL)   Date Value   12/01/2017 61       (goal LDL is <100)   AST (U/L)   Date Value   06/12/2019 23     ALT (U/L)   Date Value   06/12/2019 27     BUN (mg/dL)   Date Value   06/12/2019 11     BP Readings from Last 3 Encounters:   09/05/19 124/76   07/17/19 132/74   06/12/19 (!) 160/82          (goal 120/80)    All Future Testing planned in [facility]  Lab Frequency Next Occurrence   Basic Metabolic Panel Once 31/56/1659   Lipid Panel Once 10/14/2019   Cologuard Once 09/12/2019   CT LUNG SCREENING Once 09/12/2019         Patient Active Problem List:     Essential hypertension     Cigarette smoker     Pneumonia of right lower lobe due to infectious organism ([facility] [address]     Chronic coughing     Alcohol abuse     Headache     Intractable migraine without status migrainosus     Ataxia     Leukocytosis     COPD, severity to be determined ([address]     Chronic

## 2019-09-23 DIAGNOSIS — R51.9 CHRONIC INTRACTABLE HEADACHE, UNSPECIFIED HEADACHE TYPE: ICD-10-CM

## 2019-09-23 DIAGNOSIS — I10 ESSENTIAL HYPERTENSION: Primary | ICD-10-CM

## 2019-09-23 DIAGNOSIS — K21.9 GASTROESOPHAGEAL REFLUX DISEASE WITHOUT ESOPHAGITIS: ICD-10-CM

## 2019-09-23 DIAGNOSIS — G89.29 CHRONIC INTRACTABLE HEADACHE, UNSPECIFIED HEADACHE TYPE: ICD-10-CM

## 2019-09-23 RX ORDER — AMITRIPTYLINE HYDROCHLORIDE 25 MG/1
TABLET, FILM COATED ORAL
Qty: 30 TABLET | Refills: 1 | Status: SHIPPED | OUTPATIENT
Start: 2019-09-23 | End: 2019-12-02 | Stop reason: SDUPTHER

## 2019-09-23 RX ORDER — FAMOTIDINE 20 MG/1
TABLET, FILM COATED ORAL
Qty: 60 TABLET | Refills: 1 | Status: SHIPPED | OUTPATIENT
Start: 2019-09-23 | End: 2019-12-02 | Stop reason: SDUPTHER

## 2019-09-23 NOTE — TELEPHONE ENCOUNTER
determined ([facility]     Chronic hepatitis C without hepatic coma (HCC)     Restrictive lung disease     Prediabetes

## 2019-09-24 ENCOUNTER — HOSPITAL ENCOUNTER (OUTPATIENT)
Dept: CT IMAGING | Age: 64
Discharge: HOME | End: 2019-09-26
Payer: COMMERCIAL

## 2019-09-24 DIAGNOSIS — Z87.891 PERSONAL HISTORY OF NICOTINE DEPENDENCE: ICD-10-CM

## 2019-09-24 DIAGNOSIS — R73.03 PRE-DIABETES: ICD-10-CM

## 2019-09-24 PROCEDURE — G0297 LDCT FOR LUNG CA SCREEN: HCPCS

## 2019-09-24 RX ORDER — LOSARTAN POTASSIUM AND HYDROCHLOROTHIAZIDE 12.5; 5 MG/1; MG/1
2 TABLET ORAL DAILY
Qty: 60 TABLET | Refills: 0 | Status: SHIPPED | OUTPATIENT
Start: 2019-09-24 | End: 2019-10-24 | Stop reason: SDUPTHER

## 2019-09-24 NOTE — TELEPHONE ENCOUNTER
without status migrainosus     Ataxia     Leukocytosis     COPD, severity to be determined ([facility] [address]     Chronic hepatitis C without hepatic coma (HCC)     Restrictive lung disease     Prediabetes

## 2019-10-22 ENCOUNTER — TELEPHONE (OUTPATIENT)
Dept: INTERNAL MEDICINE | Age: 64
End: 2019-10-22

## 2019-10-24 DIAGNOSIS — I10 HYPERTENSION, WELL CONTROLLED: ICD-10-CM

## 2019-10-24 DIAGNOSIS — R51.9 CHRONIC INTRACTABLE HEADACHE, UNSPECIFIED HEADACHE TYPE: ICD-10-CM

## 2019-10-24 DIAGNOSIS — I10 ESSENTIAL HYPERTENSION: ICD-10-CM

## 2019-10-24 DIAGNOSIS — G89.29 CHRONIC INTRACTABLE HEADACHE, UNSPECIFIED HEADACHE TYPE: ICD-10-CM

## 2019-10-27 RX ORDER — LOSARTAN POTASSIUM AND HYDROCHLOROTHIAZIDE 12.5; 5 MG/1; MG/1
TABLET ORAL
Qty: 60 TABLET | Refills: 3 | Status: SHIPPED | OUTPATIENT
Start: 2019-10-27 | End: 2020-04-15

## 2019-10-27 RX ORDER — ASPIRIN 81 MG/1
TABLET ORAL
Qty: 30 TABLET | Refills: 3 | Status: SHIPPED | OUTPATIENT
Start: 2019-10-27 | End: 2020-04-15

## 2019-10-30 ENCOUNTER — OFFICE VISIT (OUTPATIENT)
Dept: GASTROENTEROLOGY | Age: 64
End: 2019-10-30
Payer: COMMERCIAL

## 2019-10-30 VITALS
DIASTOLIC BLOOD PRESSURE: 85 MMHG | HEART RATE: 82 BPM | SYSTOLIC BLOOD PRESSURE: 137 MMHG | WEIGHT: 180 LBS | BODY MASS INDEX: 27.37 KG/M2

## 2019-10-30 DIAGNOSIS — B18.2 CHRONIC HEPATITIS C WITHOUT HEPATIC COMA (HCC): Primary | ICD-10-CM

## 2019-10-30 PROCEDURE — 4004F PT TOBACCO SCREEN RCVD TLK: CPT | Performed by: INTERNAL MEDICINE

## 2019-10-30 PROCEDURE — 3017F COLORECTAL CA SCREEN DOC REV: CPT | Performed by: INTERNAL MEDICINE

## 2019-10-30 PROCEDURE — G8417 CALC BMI ABV UP PARAM F/U: HCPCS | Performed by: INTERNAL MEDICINE

## 2019-10-30 PROCEDURE — 99213 OFFICE O/P EST LOW 20 MIN: CPT | Performed by: INTERNAL MEDICINE

## 2019-10-30 PROCEDURE — G8427 DOCREV CUR MEDS BY ELIG CLIN: HCPCS | Performed by: INTERNAL MEDICINE

## 2019-10-30 PROCEDURE — G8484 FLU IMMUNIZE NO ADMIN: HCPCS | Performed by: INTERNAL MEDICINE

## 2019-12-02 DIAGNOSIS — G89.29 CHRONIC INTRACTABLE HEADACHE, UNSPECIFIED HEADACHE TYPE: ICD-10-CM

## 2019-12-02 DIAGNOSIS — K21.9 GASTROESOPHAGEAL REFLUX DISEASE WITHOUT ESOPHAGITIS: ICD-10-CM

## 2019-12-02 DIAGNOSIS — R51.9 CHRONIC INTRACTABLE HEADACHE, UNSPECIFIED HEADACHE TYPE: ICD-10-CM

## 2019-12-02 DIAGNOSIS — R73.03 PRE-DIABETES: ICD-10-CM

## 2019-12-03 RX ORDER — FAMOTIDINE 20 MG/1
TABLET, FILM COATED ORAL
Qty: 60 TABLET | Refills: 1 | Status: SHIPPED | OUTPATIENT
Start: 2019-12-03 | End: 2020-02-05

## 2019-12-03 RX ORDER — AMITRIPTYLINE HYDROCHLORIDE 25 MG/1
TABLET, FILM COATED ORAL
Qty: 30 TABLET | Refills: 1 | Status: SHIPPED | OUTPATIENT
Start: 2019-12-03 | End: 2020-02-05

## 2019-12-03 RX ORDER — BUTALBITAL, ACETAMINOPHEN AND CAFFEINE 50; 325; 40 MG/1; MG/1; MG/1
TABLET ORAL
Qty: 20 TABLET | Refills: 0 | Status: SHIPPED | OUTPATIENT
Start: 2019-12-03 | End: 2020-02-05

## 2019-12-06 DIAGNOSIS — I10 HYPERTENSION, WELL CONTROLLED: ICD-10-CM

## 2020-04-16 RX ORDER — IBUPROFEN 800 MG/1
TABLET ORAL
Qty: 120 TABLET | Refills: 3 | Status: SHIPPED | OUTPATIENT
Start: 2020-04-16 | End: 2020-09-29

## 2020-04-21 ENCOUNTER — VIRTUAL VISIT (OUTPATIENT)
Dept: INTERNAL MEDICINE | Age: 65
End: 2020-04-21
Payer: COMMERCIAL

## 2020-04-21 PROCEDURE — 99443 PR PHYS/QHP TELEPHONE EVALUATION 21-30 MIN: CPT | Performed by: INTERNAL MEDICINE

## 2020-07-08 RX ORDER — TIOTROPIUM BROMIDE AND OLODATEROL 3.124; 2.736 UG/1; UG/1
2 SPRAY, METERED RESPIRATORY (INHALATION) DAILY
Qty: 2 INHALER | Refills: 2 | Status: SHIPPED | OUTPATIENT
Start: 2020-07-08 | End: 2020-07-21 | Stop reason: ALTCHOICE

## 2020-07-08 NOTE — TELEPHONE ENCOUNTER
Request for stiolto - med pended. Please fill if appropriate. Next Visit Date:  No future appointments. Health Maintenance   Topic Date Due    Hepatitis A vaccine (1 of 2 - Risk 2-dose series) 01/15/1956    Shingles Vaccine (1 of 2) 01/15/2006    A1C test (Diabetic or Prediabetic)  06/12/2020    Potassium monitoring  06/12/2020    Creatinine monitoring  06/12/2020    Flu vaccine (1) 09/01/2020    Low dose CT lung screening  09/24/2020    Lipid screen  12/01/2022    Colon cancer screen fecal DNA test (Cologuard)  01/28/2023    DTaP/Tdap/Td vaccine (2 - Td) 02/26/2026    Pneumococcal 0-64 years Vaccine  Completed    HIV screen  Completed    Hib vaccine  Aged Out    Meningococcal (ACWY) vaccine  Aged Out       Hemoglobin A1C (%)   Date Value   06/12/2019 5.4   05/25/2018 6.0   12/01/2017 6.3 (H)             ( goal A1C is < 7)   Microalb/Crt.  Ratio (mcg/mg creat)   Date Value   08/29/2018 16     LDL Cholesterol (mg/dL)   Date Value   12/01/2017 61       (goal LDL is <100)   AST (U/L)   Date Value   06/12/2019 23     ALT (U/L)   Date Value   06/12/2019 27     BUN (mg/dL)   Date Value   06/12/2019 11     BP Readings from Last 3 Encounters:   10/30/19 137/85   09/05/19 124/76   07/17/19 132/74          (goal 120/80)    All Future Testing planned in [facility]  Lab Frequency Next Occurrence   CBC Auto Differential Once 10/30/2019   Comprehensive Metabolic Panel Once 80/15/6998   Ethanol Once 10/30/2019   Hepatitis A Antibody, Total Once 10/30/2019   Hepatitis B Surface Antibody Once 10/30/2019   Hepatitis C RNA, quantitative, PCR Once 10/30/2019   Liver Fibrosis, Chronic Viral Hepatitis Once 10/30/2019   Urine Drug Screen Once 10/30/2019   HIV Screen Once 10/30/2019   US Liver Once 10/30/2019   Hemoglobin A1C Once 07/29/2020   Lipid, Fasting Once 07/29/2020         Patient Active Problem List:     Essential hypertension     Cigarette smoker     Pneumonia of right lower lobe due to infectious organism ([facility]     Chronic coughing     Alcohol abuse     Headache     Intractable migraine without status migrainosus     Ataxia     Leukocytosis     COPD, severity to be determined ([facility]     Chronic hepatitis C without hepatic coma (HCC)     Restrictive lung disease     Prediabetes

## 2020-07-16 ENCOUNTER — TELEPHONE (OUTPATIENT)
Dept: INTERNAL MEDICINE | Age: 65
End: 2020-07-16

## 2020-07-16 NOTE — TELEPHONE ENCOUNTER
Kaya Garnica from [address] called and said the [facility] is not covered but Sp[facility] is. Could Dr. Lulu Victor send a new script for Spiriva?

## 2020-08-07 ENCOUNTER — TELEPHONE (OUTPATIENT)
Dept: INTERNAL MEDICINE | Age: 65
End: 2020-08-07

## 2020-08-07 NOTE — LETTER
[address]  [address] [facility] [address]  Phone: [phone]  Fax: [phone]    Aryan Hamm MD        August 7, 2020    [address] [place] [address]      Dear Cesilia Cochran: We are sending this letter because your PCP ordered [facility] for you to have done at your last visit here and they have not yet been completed. If you can please come to our office on the 2nd floor to  your orders to have them compelted. If you do not have a follow-up appointment scheduled you can either contact the office to make an appointment with us or you can make one when you come in to pick-up your orders. If you have any questions or concerns, please don't hesitate to call.     Sincerely,        Aryan Hamm MD

## 2020-10-08 ENCOUNTER — OFFICE VISIT (OUTPATIENT)
Dept: INTERNAL MEDICINE | Age: 65
End: 2020-10-08
Payer: COMMERCIAL

## 2020-10-08 VITALS
DIASTOLIC BLOOD PRESSURE: 69 MMHG | SYSTOLIC BLOOD PRESSURE: 126 MMHG | BODY MASS INDEX: 28.89 KG/M2 | WEIGHT: 190.6 LBS | OXYGEN SATURATION: 99 % | HEART RATE: 84 BPM | HEIGHT: 68 IN

## 2020-10-08 PROBLEM — J43.9 EMPHYSEMA LUNG (HCC): Status: ACTIVE | Noted: 2018-08-17

## 2020-10-08 PROBLEM — M54.9 MUSCULOSKELETAL BACK PAIN: Status: ACTIVE | Noted: 2020-10-08

## 2020-10-08 PROBLEM — D13.5 ADENOMYOMATOSIS OF GALLBLADDER: Status: ACTIVE | Noted: 2020-10-08

## 2020-10-08 LAB — HBA1C MFR BLD: 5.8 %

## 2020-10-08 PROCEDURE — 83036 HEMOGLOBIN GLYCOSYLATED A1C: CPT | Performed by: STUDENT IN AN ORGANIZED HEALTH CARE EDUCATION/TRAINING PROGRAM

## 2020-10-08 PROCEDURE — G0010 ADMIN HEPATITIS B VACCINE: HCPCS | Performed by: INTERNAL MEDICINE

## 2020-10-08 PROCEDURE — G8926 SPIRO NO PERF OR DOC: HCPCS | Performed by: STUDENT IN AN ORGANIZED HEALTH CARE EDUCATION/TRAINING PROGRAM

## 2020-10-08 PROCEDURE — 4004F PT TOBACCO SCREEN RCVD TLK: CPT | Performed by: STUDENT IN AN ORGANIZED HEALTH CARE EDUCATION/TRAINING PROGRAM

## 2020-10-08 PROCEDURE — 3023F SPIROM DOC REV: CPT | Performed by: STUDENT IN AN ORGANIZED HEALTH CARE EDUCATION/TRAINING PROGRAM

## 2020-10-08 PROCEDURE — G0296 VISIT TO DETERM LDCT ELIG: HCPCS | Performed by: STUDENT IN AN ORGANIZED HEALTH CARE EDUCATION/TRAINING PROGRAM

## 2020-10-08 PROCEDURE — G0008 ADMIN INFLUENZA VIRUS VAC: HCPCS | Performed by: INTERNAL MEDICINE

## 2020-10-08 PROCEDURE — 90632 HEPA VACCINE ADULT IM: CPT | Performed by: INTERNAL MEDICINE

## 2020-10-08 PROCEDURE — G8427 DOCREV CUR MEDS BY ELIG CLIN: HCPCS | Performed by: STUDENT IN AN ORGANIZED HEALTH CARE EDUCATION/TRAINING PROGRAM

## 2020-10-08 PROCEDURE — 96160 PT-FOCUSED HLTH RISK ASSMT: CPT | Performed by: STUDENT IN AN ORGANIZED HEALTH CARE EDUCATION/TRAINING PROGRAM

## 2020-10-08 PROCEDURE — 3017F COLORECTAL CA SCREEN DOC REV: CPT | Performed by: STUDENT IN AN ORGANIZED HEALTH CARE EDUCATION/TRAINING PROGRAM

## 2020-10-08 PROCEDURE — G8417 CALC BMI ABV UP PARAM F/U: HCPCS | Performed by: STUDENT IN AN ORGANIZED HEALTH CARE EDUCATION/TRAINING PROGRAM

## 2020-10-08 PROCEDURE — 99214 OFFICE O/P EST MOD 30 MIN: CPT | Performed by: STUDENT IN AN ORGANIZED HEALTH CARE EDUCATION/TRAINING PROGRAM

## 2020-10-08 PROCEDURE — G8482 FLU IMMUNIZE ORDER/ADMIN: HCPCS | Performed by: STUDENT IN AN ORGANIZED HEALTH CARE EDUCATION/TRAINING PROGRAM

## 2020-10-08 RX ORDER — CYCLOBENZAPRINE HCL 5 MG
5 TABLET ORAL 3 TIMES DAILY PRN
Qty: 15 TABLET | Refills: 0 | Status: SHIPPED | OUTPATIENT
Start: 2020-10-08 | End: 2020-10-29

## 2020-10-08 ASSESSMENT — ENCOUNTER SYMPTOMS
SHORTNESS OF BREATH: 0
CHEST TIGHTNESS: 0
BACK PAIN: 1
DIARRHEA: 0
WHEEZING: 0
VOMITING: 0
ABDOMINAL DISTENTION: 1
COUGH: 0
CONSTIPATION: 0
COLOR CHANGE: 0
NAUSEA: 0

## 2020-10-08 ASSESSMENT — PATIENT HEALTH QUESTIONNAIRE - PHQ9
SUM OF ALL RESPONSES TO PHQ QUESTIONS 1-9: 8
7. TROUBLE CONCENTRATING ON THINGS, SUCH AS READING THE NEWSPAPER OR WATCHING TELEVISION: 0
9. THOUGHTS THAT YOU WOULD BE BETTER OFF DEAD, OR OF HURTING YOURSELF: 2
8. MOVING OR SPEAKING SO SLOWLY THAT OTHER PEOPLE COULD HAVE NOTICED. OR THE OPPOSITE, BEING SO FIGETY OR RESTLESS THAT YOU HAVE BEEN MOVING AROUND A LOT MORE THAN USUAL: 0
SUM OF ALL RESPONSES TO PHQ QUESTIONS 1-9: 8
5. POOR APPETITE OR OVEREATING: 0
SUM OF ALL RESPONSES TO PHQ9 QUESTIONS 1 & 2: 3
10. IF YOU CHECKED OFF ANY PROBLEMS, HOW DIFFICULT HAVE THESE PROBLEMS MADE IT FOR YOU TO DO YOUR WORK, TAKE CARE OF THINGS AT HOME, OR GET ALONG WITH OTHER PEOPLE: 0
1. LITTLE INTEREST OR PLEASURE IN DOING THINGS: 1
3. TROUBLE FALLING OR STAYING ASLEEP: 0
6. FEELING BAD ABOUT YOURSELF - OR THAT YOU ARE A FAILURE OR HAVE LET YOURSELF OR YOUR FAMILY DOWN: 0
2. FEELING DOWN, DEPRESSED OR HOPELESS: 2
4. FEELING TIRED OR HAVING LITTLE ENERGY: 3

## 2020-10-08 ASSESSMENT — COLUMBIA-SUICIDE SEVERITY RATING SCALE - C-SSRS
1. WITHIN THE PAST MONTH, HAVE YOU WISHED YOU WERE DEAD OR WISHED YOU COULD GO TO SLEEP AND NOT WAKE UP?: YES
2. HAVE YOU ACTUALLY HAD ANY THOUGHTS OF KILLING YOURSELF?: NO
6. HAVE YOU EVER DONE ANYTHING, STARTED TO DO ANYTHING, OR PREPARED TO DO ANYTHING TO END YOUR LIFE?: NO

## 2020-10-08 NOTE — PROGRESS NOTES
Patient here for follow-up. He has history of alcoholism and chronic hepatitis C. He saw GI last year but has not followed up since then. He has not done a liver ultrasound. He is trying to quit drinking. He agrees to hepatitis A and B vaccinations. He is complaining of right-sided musculoskeletal flank pain. No spinal tenderness. No dysuria. It is going on for a month. Abdominal exam reveals divarication of recti and abdominal wall weakness. No tenderness. No flank tenderness. Results for POC orders placed in visit on 10/08/20   POCT glycosylated hemoglobin (Hb A1C)   Result Value Ref Range    Hemoglobin A1C 5.8 %     Patient advised to get labs and liver ultrasound for follow-up. He also has gallstones and adenomyomatosis. Will get low-dose CT scan for his chest as well for follow-up. Advised to follow-up with GI and quit drinking alcohol. Vaccines to be updated. Attending Physician Statement  I have discussed the care of Nay Nazario, including pertinent history and exam findings,  with the resident. I have seen and examined the patient and the key elements of all parts of the encounter have been performed by me. I agree with the assessment, plan and orders as documented by the resident.   (GC Modifier)

## 2020-10-08 NOTE — PROGRESS NOTES
[facility] [address] [address]  Dept: [phone]  Dept Fax: [phone]    Office Progress/Follow Up Note  Date ofpatient's visit: 10/8/2020  Patient's Name:  Latisha Mallory YOB: 1955            Patient Care Team:  Nikos Malone MD as PCP - General (Internal Medicine)  Siddharth Angulo MD as Consulting Physician (Pulmonology)  Modesta Morton RN as Nurse Tasha Rodrigues MD as Consulting Physician (Gastroenterology)  ================================================================    REASON FOR VISIT/CHIEF COMPLAINT:  Hypertension; Health Maintenance; and Pain (pt states been having right side pain for x3 weeks. Pt states have took IBU for pain but not helping. No fall or injury.)    HISTORY OF PRESENTING ILLNESS:  History was obtained from: patient, electronic medical record. Ezra Reed a 60 y.o. is here for a follow-up of hypertension, restrictive lung disease, chronic hepatitis C without coma. Today in the clinic, the patient is complaining of pain in the right side of the abdomen which radiates to the back. He stated that the pain is worse when the patient tries to lift something or moves. He has noticed pain specifically on sharp turning and twisting of his belly. The patient has tried ibuprofen 800 mg at home for pain which helps him for 10 to 15 minutes and the pain comes back. Today, the patient is requesting refills all of his medications. Today in the clinic, the patient's blood pressure is good control. The patient is on Lopressor 25 mg BD and Hyzaar. The patient's hemoglobin A1c during this visit is 5.8. The patient takes metformin at home. The patient denies any complaints of cough, shortness of breath. The patient had a CT scan of the chest low-dose done in September 2019 which was negative for any malignancy but did show emphysematous lung.   The patient had a PFT done in June 2018 which showed restrictive lung disease. The patient is taking all of his medications regularly. Regarding colon cancer, the patient had a Cologuard done in January 2020 which was normal.  The patient has hepatitis C, chronic with genotype 1a, 1B. The patient's last hepatitis C quantitative PCR RNA as of August 2018 is positive [id number] IU/ML. The patient had elevated AFP's in the past.  His last ultrasound liver as of September 2018 was negative for any mass. No ascites or cirrhosis evidence as well. It did show adenomyomatosis of the gallbladder. The patient's Fib-4 score is 1.24 suggestive of stage 0-I fibrosis. The patient is an active smoker and smokes like 3 cigarettes a day. The patient drinks alcohol occasionally as well. He denies use of recreational drugs. Discussed with the patient in detail. The patient was following gastroenterologist before but because of his alcoholism, the patient was not a candidate for treatment. Commended the patient that he needs to quit drinking for his treatment.     The patient is agreeable for hepatitis A vaccine, hepatitis B vaccine, shingles vaccine, flu vaccine and low-dose CT scan of the chest.    Patient Active Problem List   Diagnosis    Essential hypertension    Cigarette smoker    Alcohol abuse    Intractable migraine without status migrainosus    Emphysema lung seen on Low Dose CT Chest 9/2019 [facility])    Chronic hepatitis C without hepatic coma (HCC)    Restrictive lung disease    Adenomyomatosis of gallbladder    Musculoskeletal back pain       Health Maintenance Due   Topic Date Due    Hepatitis A vaccine (1 of 2 - Risk 2-dose series) 01/15/1957    Hepatitis B vaccine (1 of 3 - Risk 3-dose series) 01/15/1975    Shingles Vaccine (1 of 2) 01/15/2006    Potassium monitoring  06/12/2020    Creatinine monitoring  06/12/2020    Low dose CT lung screening  09/24/2020       No Known Allergies      Current Outpatient Medications   Medication Sig Dispense Refill    ibuprofen (ADVIL;MOTRIN) 800 MG tablet TAKE ONE TABLET BY MOUTH EVERY 6 HOURS AS NEEDED FOR PAIN 120 tablet 0    metFORMIN (GLUCOPHAGE) 500 MG tablet TAKE 1 TABLET BY MOUTH 2 TIMES A DAY WITH MEALS 60 tablet 1    metoprolol tartrate (LOPRESSOR) 25 MG tablet TAKE ONE-HALF TABLET BY MOUTH TWO TIMES A DAY 60 tablet 4    losartan-hydroCHLOROthiazide (HYZAAR) 50-12.5 MG per tablet TAKE TWO TABLETS BY MOUTH EVERY DAY **THIS IS TO REPLACE /25MG TABLET STOP TAKING** 30 tablet 5    famotidine (PEPCID) 20 MG tablet TAKE 1 TABLET BY MOUTH 2 TIMES A DAY 60 tablet 5    aspirin (ASPIRIN ADULT LOW STRENGTH) 81 MG EC tablet TAKE 1 TABLET BY MOUTH ONE TIME A DAY 30 tablet 5    tiotropium (SPIRIVA RESPIMAT) 2.5 MCG/ACT AERS inhaler Inhale 2 puffs into the lungs daily 2 Inhaler 3    butalbital-acetaminophen-caffeine (FIORICET, ESGIC) -40 MG per tablet TAKE ONE TABLET BY MOUTH EVERY 12 HOURS AS NEEDED FOR HEADACHES -- DONT DRIVE IF TAKING THIS MEDICIATION WILL CAUSE DROWSINESS 30 tablet 4    albuterol sulfate  (90 Base) MCG/ACT inhaler Inhale 2 puffs into the lungs every 6 hours as needed for Wheezing 1 Inhaler 3    fluticasone (FLONASE) 50 MCG/ACT nasal spray 1 spray by Each Nare route daily 1 Spray in each nostril 2 Bottle 1    sodium chloride (ALTAMIST SPRAY) 0.65 % nasal spray 1 spray by Nasal route as needed for Congestion 1 Bottle 3    guaiFENesin 400 MG tablet Take 1 tablet by mouth 4 times daily as needed for Cough 56 tablet 0    tears naturale II (CELLUGEL) SOLN ophthalmic solution Place 1 drop into both eyes as needed for Dry Eyes (Patient not taking: Reported on 4/21/2020) 1 Bottle 1     No current facility-administered medications for this visit.         Social History     Tobacco Use    Smoking status: Current Every Day Smoker     Packs/day: 0.75     Years: 40.00     Pack years: 30.00     Types: Cigarettes     Start date: 2/26/1977    Smokeless tobacco: Former User   Substance Use Topics    Alcohol use: Yes     Alcohol/week: 14.0 standard drinks     Types: 14 Cans of beer per week    Drug use: No       Family History   Problem Relation Age of Onset    Heart Disease Mother     High Blood Pressure Mother     Stroke Father     High Blood Pressure Father     Cancer Sister     Heart Disease Sister         REVIEW OF SYSTEMS:  Review of Systems   Constitutional: Negative for chills, diaphoresis, fatigue and fever. HENT: Negative for congestion. Respiratory: Negative for cough, chest tightness, shortness of breath and wheezing. Cardiovascular: Negative for chest pain, palpitations and leg swelling. Gastrointestinal: Positive for abdominal distention. Negative for constipation, diarrhea, nausea and vomiting. Musculoskeletal: Positive for back pain and myalgias. Negative for gait problem, joint swelling, neck pain and neck stiffness. Skin: Negative for color change, pallor, rash and wound. Psychiatric/Behavioral: Negative for agitation, behavioral problems and confusion. PHYSICAL EXAM:  Vitals:    10/08/20 1425   BP: 126/69   Site: Right Upper Arm   Position: Sitting   Cuff Size: Large Adult   Pulse: 84   SpO2: 99%   Weight: 190 lb 9.6 oz (86.5 kg)   Height: 5' 8\" (1.727 m)     BP Readings from Last 3 Encounters:   10/08/20 126/69   10/30/19 137/85   09/05/19 124/76        Physical Exam  Constitutional:       Appearance: Normal appearance. HENT:      Head: Normocephalic and atraumatic. Nose: Nose normal.      Mouth/Throat:      Mouth: Mucous membranes are dry. Eyes:      Extraocular Movements: Extraocular movements intact. Pupils: Pupils are equal, round, and reactive to light. Cardiovascular:      Rate and Rhythm: Normal rate and regular rhythm. Pulmonary:      Effort: Pulmonary effort is normal.      Breath sounds: Normal breath sounds. Abdominal:      General: There is distension. Palpations: Abdomen is soft. There is mass.       Tenderness: There is no abdominal tenderness. Musculoskeletal: Normal range of motion. Skin:     General: Skin is warm and dry. Neurological:      General: No focal deficit present. Mental Status: He is alert and oriented to person, place, and time. Psychiatric:         Mood and Affect: Mood normal.         Behavior: Behavior normal.           DIAGNOSTIC FINDINGS:  CBC:  Lab Results   Component Value Date    WBC 9.5 06/12/2019    HGB 13.8 06/12/2019     06/12/2019       BMP:    Lab Results   Component Value Date     06/12/2019    K 4.5 06/12/2019     06/12/2019    CO2 21 06/12/2019    BUN 11 06/12/2019    CREATININE 0.88 06/12/2019    GLUCOSE 89 06/12/2019       HEMOGLOBIN A1C:   Lab Results   Component Value Date    LABA1C 5.4 06/12/2019       FASTING LIPID PANEL:  Lab Results   Component Value Date    CHOL 139 12/01/2017    HDL 57 12/01/2017    TRIG 104 12/01/2017       ASSESSMENT AND PLAN:  Saadia Meza was seen today for hypertension, health maintenance and pain. Diagnoses and all orders for this visit:    Chronic hepatitis C without hepatic coma ([facility] [address]  Will check CBC, CMP, alpha-fetoprotein, hepatitis C RNA and genotyping. Obtain ultrasound liver and gallbladder as well. Hypertension, well controlled  Continue Hyzaar and Lopressor. Chronic obstructive pulmonary disease, unspecified COPD type (HCC)  Continue Flonase, albuterol, Spiriva. Gastroesophageal reflux disease without esophagitis  Continue Pepcid. Adenomyomatosis of gallbladder  Ultrasound liver and gallbladder ordered. Musculoskeletal back pain  Will prescribe Flexeril for a total of 15 doses. Screening for diabetes mellitus (DM)  SYDNIE globin A1c 5.8 during this visit. Needs flu shot  -     INFLUENZA, QUADV, 3 YRS AND OLDER, IM, MDV, 0.5ML (AFLURIA QUADV)  -     NH ADMIN INFLUENZA VIRUS VAC    Need for prophylactic vaccination and inoculation against varicella  Vaccine ordered.     Dry eyes, bilateral  Eyedrops refilled. Need for hepatitis A and B vaccination  Examination for hepatitis a and B ordered. Encounter for screening for lung cancer  Low-dose CT scan of the chest ordered. Flu vaccine need  Vaccine ordered. FOLLOW UP AND INSTRUCTIONS:  · Follow up in three months. · Sri Sharma received counseling on the following healthy behaviors: nutrition, exercise, medication adherence, tobacco cessation and decrease in alcohol consumption    · Discussed use, benefit, and side effects of prescribed medications. Barriers to medication compliance addressed. All patient questions answered. Pt voiced understanding. · Patient given educational materials - see patient instructions    Zach Ervin MD  PGY-3, Department of Internal Medicine  [address], [place]  10/8/2020 3:13 PM    This note is created with the assistance of a speech-recognition program. While intending to generate a document that actually reflects the content of thevisit, the document can still have some mistakes which may not have been identified and corrected by editing. Low Dose CT (LDCT) Lung Screening criteria met   Age 50-69   Pack year smoking >30   Still smoking or less than 15 year since quit   No sign or symptoms of lung cancer   > 11 months since last LDCT     Risks and benefits of lung cancer screening with LDCT scans discussed:    Significance of positive screen - False-positive LDCT results often occur. 95% of all positive results do not lead to a diagnosis of cancer. Usually further imaging can resolve most false-positive results; however, some patients may require invasive procedures. Over diagnosis risk - 10% to 12% of screen-detected lung cancer cases are over diagnosed--that is, the cancer would not have been detected in the patient's lifetime without the screening.     Need for follow up screens annually to continue lung cancer screening effectiveness     Risks associated with radiation from annual LDCT- Radiation exposure is about the same as for a mammogram, which is about 1/3 of the annual background radiation exposure from everyday life. Starting screening at age 54 is not likely to increase cancer risk from radiation exposure. Patients with comorbidities resulting in life expectancy of < 10 years, or that would preclude treatment of an abnormality identified on CT, should not be screened due to lack of benefit.     To obtain maximal benefit from this screening, smoking cessation and long-term abstinence from smoking is critical

## 2020-10-08 NOTE — PATIENT INSTRUCTIONS
What is lung cancer screening? Lung cancer screening is a way in which doctors check the lungs for early signs of cancer in people who have no symptoms of lung cancer. A low-dose CT scan uses much less radiation than a normal CT scan and shows a more detailed image of the lungs than a standard X-ray. The goal of lung cancer screening is to find cancer early, before it has a chance to grow, spread, or cause problems. One large study found that smokers who were screened with low-dose CT scans were less likely to die of lung cancer than those who were screened with standard X-ray. Below is a summary of the things you need to know regarding screening for lung cancer with low-dose computed tomography (LDCT). This is a screening program that involves routine annual screening with LDCT studies of the lung. The LDCTs are done using low-dose radiation that is not thought to increase your cancer risk. If you have other serious medical conditions (other cancers, congestive heart failure) that limit your life expectancy to less than 10 years, you should not undergo lung cancer screening with LDCT. The chance is 20%-60% that the LDCT result will show abnormalities. This would require additional testing which could include repeat imaging or even invasive procedures. Most (about 95%) of \"abnormal\" LDCT results are false in the sense that no lung cancer is ultimately found. Additionally, some (about 10%) of the cancers found would not affect your life expectancy, even if undetected and untreated. If you are still smoking, the single most important thing that you can do to reduce your risk of dying of lung cancer is to quit. For this screening to be covered by Medicare and most other insurers, strict criteria must be met. If you do not meet these criteria, but still wish to undergo LDCT testing, you will be required to sign a waiver indicating your willingness to pay for the scan.       Return To Clinic Visit date not found. After Visit Summary  given and reviewed. It is very important for your care that you keep your appointment. If for some reason you are unable to keep your appointment it is equally important that you call our office at [phone] to cancel your appointment and reschedule. Failure to do so may result in your termination from our practice. ck     Script called into pharmacy    -Bloodwork orders given to patient, they will have them done before their next visit.     -Your doctor has ordered a CT and US for you, please contact our scheduling department at [phone] to set up an appointment to have this completed before your next visit. Script for shingles printed.

## 2020-10-08 NOTE — PROGRESS NOTES
Visit Information    Have you changed or started any medications since your last visit including any over-the-counter medicines, vitamins, or herbal medicines? no   Are you having any side effects from any of your medications? -  no  Have you stopped taking any of your medications? Is so, why? -  no    Have you seen any other physician or provider since your last visit? No  Have you had any other diagnostic tests since your last visit? No  Have you been seen in the emergency room and/or had an admission to a hospital since we last saw you? No  Have you had your routine dental cleaning in the past 6 months? no    Have you activated your [employer] account? If not, what are your barriers?  No     Patient Care Team:  Lucio Vidal MD as PCP - General (Internal Medicine)  Maile South MD as Consulting Physician (Pulmonology)  Sonia Mccarthy RN as Nurse Archana Shah MD as Consulting Physician (Gastroenterology)    Medical History Review  Past Medical, Family, and Social History reviewed and does contribute to the patient presenting condition    Health Maintenance   Topic Date Due    Hepatitis A vaccine (1 of 2 - Risk 2-dose series) 01/15/1957    Hepatitis B vaccine (1 of 3 - Risk 3-dose series) 01/15/1975    Shingles Vaccine (1 of 2) 01/15/2006    A1C test (Diabetic or Prediabetic)  06/12/2020    Potassium monitoring  06/12/2020    Creatinine monitoring  06/12/2020    Flu vaccine (1) 09/01/2020    Low dose CT lung screening  09/24/2020    Lipid screen  12/01/2022    Colon cancer screen fecal DNA test (Cologuard)  01/28/2023    DTaP/Tdap/Td vaccine (2 - Td) 02/26/2026    Pneumococcal 0-64 years Vaccine  Completed    HIV screen  Completed    Hib vaccine  Aged Out    Meningococcal (ACWY) vaccine  Aged Out

## 2020-10-12 ENCOUNTER — TELEPHONE (OUTPATIENT)
Dept: ONCOLOGY | Age: 65
End: 2020-10-12

## 2020-10-12 NOTE — LETTER
10/12/2020        Raz San  [address] [facility] [address]    Dear Raz San:    Your healthcare provider has ordered a low dose CT scan of the chest for lung cancer screening. You will find enclosed, information about CT lung screening. Please review the statement of understanding, you will be asked to sign a copy of this at the time of your CT scan    If you have not already been contacted to make the appointment for your scan, please call our scheduling department at [phone]    Keep in mind that CT lung screening does not take the place of smoking cessation. If you are a current smoker, you will find enclosed smoking cessation resources. Please do not hesitate to contact me if you have any questions or concerns.     [address],      [address] [facility] Lung Screening Program  [phone]

## 2020-10-15 ENCOUNTER — TELEPHONE (OUTPATIENT)
Dept: INTERNAL MEDICINE | Age: 65
End: 2020-10-15

## 2020-10-29 ENCOUNTER — HOSPITAL ENCOUNTER (OUTPATIENT)
Dept: CT IMAGING | Age: 65
Discharge: HOME | End: 2020-10-31
Payer: COMMERCIAL

## 2020-10-29 ENCOUNTER — HOSPITAL ENCOUNTER (OUTPATIENT)
Dept: ULTRASOUND IMAGING | Age: 65
Discharge: HOME | End: 2020-10-31
Payer: COMMERCIAL

## 2020-10-29 ENCOUNTER — HOSPITAL ENCOUNTER (OUTPATIENT)
Age: 65
Discharge: HOME | End: 2020-10-29
Payer: COMMERCIAL

## 2020-10-29 LAB
ABSOLUTE EOS #: 0.12 K/UL (ref 0–0.44)
ABSOLUTE IMMATURE GRANULOCYTE: 0.03 K/UL (ref 0–0.3)
ABSOLUTE LYMPH #: 2.8 K/UL (ref 1.1–3.7)
ABSOLUTE MONO #: 1.38 K/UL (ref 0.1–1.2)
ALBUMIN SERPL-MCNC: 4 G/DL (ref 3.5–5.2)
ALBUMIN/GLOBULIN RATIO: 1.1 (ref 1–2.5)
ALP BLD-CCNC: 159 U/L (ref 40–129)
ALT SERPL-CCNC: 24 U/L (ref 5–41)
AMPHETAMINE SCREEN URINE: NEGATIVE
ANION GAP SERPL CALCULATED.3IONS-SCNC: 11 MMOL/L (ref 9–17)
AST SERPL-CCNC: 22 U/L
BARBITURATE SCREEN URINE: POSITIVE
BASOPHILS # BLD: 1 % (ref 0–2)
BASOPHILS ABSOLUTE: 0.08 K/UL (ref 0–0.2)
BENZODIAZEPINE SCREEN, URINE: NEGATIVE
BILIRUB SERPL-MCNC: 0.38 MG/DL (ref 0.3–1.2)
BUN BLDV-MCNC: 16 MG/DL (ref 8–23)
BUN/CREAT BLD: ABNORMAL (ref 9–20)
BUPRENORPHINE URINE: ABNORMAL
CALCIUM SERPL-MCNC: 9.2 MG/DL (ref 8.6–10.4)
CANNABINOID SCREEN URINE: NEGATIVE
CHLORIDE BLD-SCNC: 107 MMOL/L (ref 98–107)
CO2: 20 MMOL/L (ref 20–31)
COCAINE METABOLITE, URINE: POSITIVE
CREAT SERPL-MCNC: 1.12 MG/DL (ref 0.7–1.2)
DIFFERENTIAL TYPE: ABNORMAL
EOSINOPHILS RELATIVE PERCENT: 1 % (ref 1–4)
ETHANOL PERCENT: <0.01 %
ETHANOL: <10 MG/DL
GFR AFRICAN AMERICAN: >60 ML/MIN
GFR NON-AFRICAN AMERICAN: >60 ML/MIN
GFR SERPL CREATININE-BSD FRML MDRD: ABNORMAL ML/MIN/{1.73_M2}
GFR SERPL CREATININE-BSD FRML MDRD: ABNORMAL ML/MIN/{1.73_M2}
GLUCOSE BLD-MCNC: 148 MG/DL (ref 70–99)
HAV AB SERPL IA-ACNC: REACTIVE
HBV SURFACE AB TITR SER: <3.5 MIU/ML
HCT VFR BLD CALC: 36.7 % (ref 40.7–50.3)
HEMOGLOBIN: 11.7 G/DL (ref 13–17)
HIV AG/AB: NONREACTIVE
IMMATURE GRANULOCYTES: 0 %
LYMPHOCYTES # BLD: 25 % (ref 24–43)
MCH RBC QN AUTO: 30.5 PG (ref 25.2–33.5)
MCHC RBC AUTO-ENTMCNC: 31.9 G/DL (ref 28.4–34.8)
MCV RBC AUTO: 95.6 FL (ref 82.6–102.9)
MDMA URINE: ABNORMAL
METHADONE SCREEN, URINE: NEGATIVE
METHAMPHETAMINE, URINE: ABNORMAL
MONOCYTES # BLD: 12 % (ref 3–12)
NRBC AUTOMATED: 0 PER 100 WBC
OPIATES, URINE: NEGATIVE
OXYCODONE SCREEN URINE: NEGATIVE
PDW BLD-RTO: 13.8 % (ref 11.8–14.4)
PHENCYCLIDINE, URINE: NEGATIVE
PLATELET # BLD: 233 K/UL (ref 138–453)
PLATELET ESTIMATE: ABNORMAL
PMV BLD AUTO: 10.6 FL (ref 8.1–13.5)
POTASSIUM SERPL-SCNC: 3.8 MMOL/L (ref 3.7–5.3)
PROPOXYPHENE, URINE: ABNORMAL
RBC # BLD: 3.84 M/UL (ref 4.21–5.77)
RBC # BLD: ABNORMAL 10*6/UL
SEG NEUTROPHILS: 61 % (ref 36–65)
SEGMENTED NEUTROPHILS ABSOLUTE COUNT: 6.72 K/UL (ref 1.5–8.1)
SODIUM BLD-SCNC: 138 MMOL/L (ref 135–144)
TEST INFORMATION: ABNORMAL
TOTAL PROTEIN: 7.5 G/DL (ref 6.4–8.3)
TRICYCLIC ANTIDEPRESSANTS, UR: ABNORMAL
WBC # BLD: 11.1 K/UL (ref 3.5–11.3)
WBC # BLD: ABNORMAL 10*3/UL

## 2020-10-29 PROCEDURE — 87389 HIV-1 AG W/HIV-1&-2 AB AG IA: CPT

## 2020-10-29 PROCEDURE — 86708 HEPATITIS A ANTIBODY: CPT

## 2020-10-29 PROCEDURE — 84450 TRANSFERASE (AST) (SGOT): CPT

## 2020-10-29 PROCEDURE — 76705 ECHO EXAM OF ABDOMEN: CPT

## 2020-10-29 PROCEDURE — G0297 LDCT FOR LUNG CA SCREEN: HCPCS

## 2020-10-29 PROCEDURE — 87522 HEPATITIS C REVRS TRNSCRPJ: CPT

## 2020-10-29 PROCEDURE — 36415 COLL VENOUS BLD VENIPUNCTURE: CPT

## 2020-10-29 PROCEDURE — 87902 NFCT AGT GNTYP ALYS HEP C: CPT

## 2020-10-29 PROCEDURE — 80307 DRUG TEST PRSMV CHEM ANLYZR: CPT

## 2020-10-29 PROCEDURE — 83883 ASSAY NEPHELOMETRY NOT SPEC: CPT

## 2020-10-29 PROCEDURE — 85025 COMPLETE CBC W/AUTO DIFF WBC: CPT

## 2020-10-29 PROCEDURE — 80053 COMPREHEN METABOLIC PANEL: CPT

## 2020-10-29 PROCEDURE — 82977 ASSAY OF GGT: CPT

## 2020-10-29 PROCEDURE — G0480 DRUG TEST DEF 1-7 CLASSES: HCPCS

## 2020-10-29 PROCEDURE — 86317 IMMUNOASSAY INFECTIOUS AGENT: CPT

## 2020-10-29 PROCEDURE — 84460 ALANINE AMINO (ALT) (SGPT): CPT

## 2020-10-29 PROCEDURE — 84520 ASSAY OF UREA NITROGEN: CPT

## 2020-10-29 RX ORDER — CYCLOBENZAPRINE HCL 5 MG
TABLET ORAL
Qty: 15 TABLET | Refills: 0 | Status: SHIPPED | OUTPATIENT
Start: 2020-10-29 | End: 2020-12-03 | Stop reason: ALTCHOICE

## 2020-10-29 RX ORDER — IBUPROFEN 800 MG/1
TABLET ORAL
Qty: 120 TABLET | Refills: 0 | Status: SHIPPED | OUTPATIENT
Start: 2020-10-29 | End: 2020-11-30

## 2020-10-29 RX ORDER — LOSARTAN POTASSIUM AND HYDROCHLOROTHIAZIDE 25; 100 MG/1; MG/1
TABLET ORAL
Qty: 15 TABLET | Refills: 5 | Status: SHIPPED | OUTPATIENT
Start: 2020-10-29 | End: 2021-02-08

## 2020-10-29 NOTE — TELEPHONE ENCOUNTER
Request for flexeril - med pended. Please fill if appropriate. Next Visit Date:  No future appointments. Health Maintenance   Topic Date Due    Shingles Vaccine (1 of 2) 01/15/2006    Potassium monitoring  06/12/2020    Creatinine monitoring  06/12/2020    Low dose CT lung screening  09/24/2020    Hepatitis B vaccine (2 of 3 - Risk 3-dose series) 11/05/2020    Hepatitis A vaccine (2 of 2 - Risk 2-dose series) 04/08/2021    A1C test (Diabetic or Prediabetic)  10/08/2021    Lipid screen  12/01/2022    Colon cancer screen fecal DNA test (Cologuard)  01/28/2023    DTaP/Tdap/Td vaccine (2 - Td) 02/26/2026    Flu vaccine  Completed    Pneumococcal 0-64 years Vaccine  Completed    HIV screen  Completed    Hib vaccine  Aged Out    Meningococcal (ACWY) vaccine  Aged Out       Hemoglobin A1C (%)   Date Value   10/08/2020 5.8   06/12/2019 5.4   05/25/2018 6.0             ( goal A1C is < 7)   Microalb/Crt.  Ratio (mcg/mg creat)   Date Value   08/29/2018 16     LDL Cholesterol (mg/dL)   Date Value   12/01/2017 61       (goal LDL is <100)   AST (U/L)   Date Value   10/29/2020 22     ALT (U/L)   Date Value   10/29/2020 24     BUN (mg/dL)   Date Value   10/29/2020 16     BP Readings from Last 3 Encounters:   10/08/20 126/69   10/30/19 137/85   09/05/19 124/76          (goal 120/80)    All Future Testing planned in [facility]  Lab Frequency Next Occurrence   US Liver Once 10/30/2019   Lipid, Fasting Once 01/15/2021   CBC With Auto Differential Once 01/16/2021   Comprehensive Metabolic Panel Once 59/35/7637   AFP Tumor Marker Once 01/16/2021   Hepatitis C RNA, Quantitative, PCR Once 01/16/2021   Low Dose Chest CT -Abnormal Lung Screen Follow up Once 01/23/2021         Patient Active Problem List:     Essential hypertension     Cigarette smoker     Alcohol abuse     Intractable migraine without status migrainosus     Emphysema lung seen on Low Dose CT Chest 9/2019 [facility])     Chronic hepatitis C without hepatic coma [facility])     Restrictive lung disease     Adenomyomatosis of gallbladder     Musculoskeletal back pain

## 2020-10-29 NOTE — TELEPHONE ENCOUNTER
Request for losartan-HCTZ, ibu - meds pended. Please fill if appropriate. Next Visit Date:  No future appointments. Health Maintenance   Topic Date Due    Shingles Vaccine (1 of 2) 01/15/2006    Potassium monitoring  06/12/2020    Creatinine monitoring  06/12/2020    Low dose CT lung screening  09/24/2020    Hepatitis B vaccine (2 of 3 - Risk 3-dose series) 11/05/2020    Hepatitis A vaccine (2 of 2 - Risk 2-dose series) 04/08/2021    A1C test (Diabetic or Prediabetic)  10/08/2021    Lipid screen  12/01/2022    Colon cancer screen fecal DNA test (Cologuard)  01/28/2023    DTaP/Tdap/Td vaccine (2 - Td) 02/26/2026    Flu vaccine  Completed    Pneumococcal 0-64 years Vaccine  Completed    HIV screen  Completed    Hib vaccine  Aged Out    Meningococcal (ACWY) vaccine  Aged Out       Hemoglobin A1C (%)   Date Value   10/08/2020 5.8   06/12/2019 5.4   05/25/2018 6.0             ( goal A1C is < 7)   Microalb/Crt.  Ratio (mcg/mg creat)   Date Value   08/29/2018 16     LDL Cholesterol (mg/dL)   Date Value   12/01/2017 61       (goal LDL is <100)   AST (U/L)   Date Value   10/29/2020 22     ALT (U/L)   Date Value   10/29/2020 24     BUN (mg/dL)   Date Value   10/29/2020 16     BP Readings from Last 3 Encounters:   10/08/20 126/69   10/30/19 137/85   09/05/19 124/76          (goal 120/80)    All Future Testing planned in [facility]  Lab Frequency Next Occurrence   US Liver Once 10/30/2019   Lipid, Fasting Once 01/15/2021   CBC With Auto Differential Once 01/16/2021   Comprehensive Metabolic Panel Once 70/77/1545   AFP Tumor Marker Once 01/16/2021   Hepatitis C RNA, Quantitative, PCR Once 01/16/2021   Low Dose Chest CT -Abnormal Lung Screen Follow up Once 01/23/2021         Patient Active Problem List:     Essential hypertension     Cigarette smoker     Alcohol abuse     Intractable migraine without status migrainosus     Emphysema lung seen on Low Dose CT Chest 9/2019 [facility])     Chronic hepatitis C without hepatic coma (HCC)     Restrictive lung disease     Adenomyomatosis of gallbladder     Musculoskeletal back pain

## 2020-11-03 LAB
ALANINE AMINOTRANSFERASE, FIBROMETER: 29 U/L (ref 5–50)
ALPHA-2-MACROGLOBULIN, FIBROMETER: 347 MG/DL (ref 131–293)
ASPARTATE AMINOTRANSFERASE, FIBROMETER: 23 U/L (ref 9–50)
CIRRHOMETER PATIENT SCORE: 0.04
DIRECT EXAM: ABNORMAL
DIRECT EXAM: ABNORMAL
EER FIBROMETER REPORT: ABNORMAL
FIBROMETER INTERPRETATION: ABNORMAL
FIBROMETER PATIENT SCORE: 0.77
FIBROMETER PLATELET COUNT: 240
FIBROMETER PROTHROMBIN INDEX: 94 % (ref 90–120)
FIBROSIS METAVIR CLASSIFICATION: ABNORMAL
GAMMA GLUTAMYL TRANSFERASE, FIBROMETER: 175 U/L (ref 7–51)
HCV QUANTITATIVE: NORMAL
HEPATITIS C GENOTYPE: NORMAL
INFLAMETER METAVIR CLASSIFICATION: ABNORMAL
INFLAMETER PATIENT SCORE: 0.43
Lab: ABNORMAL
SPECIMEN DESCRIPTION: ABNORMAL
UREA NITROGEN, FIBROMETER: 16 MG/DL (ref 7–20)

## 2020-11-20 RX ORDER — ALBUTEROL SULFATE 90 UG/1
2 AEROSOL, METERED RESPIRATORY (INHALATION) EVERY 6 HOURS PRN
Qty: 18 G | Refills: 3 | Status: SHIPPED | OUTPATIENT
Start: 2020-11-20 | End: 2021-05-06 | Stop reason: SDUPTHER

## 2020-11-30 RX ORDER — IBUPROFEN 800 MG/1
TABLET ORAL
Qty: 120 TABLET | Refills: 0 | Status: SHIPPED | OUTPATIENT
Start: 2020-11-30 | End: 2021-03-29

## 2020-11-30 NOTE — TELEPHONE ENCOUNTER
Request for Ibuprofen. Next Visit Date:  Future Appointments   Date Time Provider Cy Andrewi   12/3/2020  2:25 PM Dionne Martini MD [facility] IM [address] Maintenance   Topic Date Due    Shingles Vaccine (1 of 2) 01/15/2006    Hepatitis B vaccine (2 of 3 - Risk 3-dose series) 11/05/2020    Hepatitis A vaccine (2 of 2 - Risk 2-dose series) 04/08/2021    A1C test (Diabetic or Prediabetic)  10/08/2021    Potassium monitoring  10/29/2021    Creatinine monitoring  10/29/2021    Low dose CT lung screening  10/29/2021    Lipid screen  12/01/2022    Colon cancer screen fecal DNA test (Cologuard)  01/28/2023    DTaP/Tdap/Td vaccine (2 - Td) 02/26/2026    Flu vaccine  Completed    Pneumococcal 0-64 years Vaccine  Completed    HIV screen  Completed    Hib vaccine  Aged Out    Meningococcal (ACWY) vaccine  Aged Out       Hemoglobin A1C (%)   Date Value   10/08/2020 5.8   06/12/2019 5.4   05/25/2018 6.0             ( goal A1C is < 7)   Microalb/Crt.  Ratio (mcg/mg creat)   Date Value   08/29/2018 16     LDL Cholesterol (mg/dL)   Date Value   12/01/2017 61       (goal LDL is <100)   AST (U/L)   Date Value   10/29/2020 22     ALT (U/L)   Date Value   10/29/2020 24     BUN (mg/dL)   Date Value   10/29/2020 16     BP Readings from Last 3 Encounters:   10/08/20 126/69   10/30/19 137/85   09/05/19 124/76          (goal 120/80)    All Future Testing planned in [facility]  Lab Frequency Next Occurrence   Lipid, Fasting Once 01/15/2021   CBC With Auto Differential Once 01/16/2021   Comprehensive Metabolic Panel Once 32/08/4846   AFP Tumor Marker Once 01/16/2021   Hepatitis C RNA, Quantitative, PCR Once 01/16/2021   Low Dose Chest CT -Abnormal Lung Screen Follow up Once 01/23/2021         Patient Active Problem List:     Essential hypertension     Cigarette smoker     Alcohol abuse     Intractable migraine without status migrainosus     Emphysema lung seen on Low Dose CT Chest 9/2019 [facility])     Chronic hepatitis C without hepatic coma (HCC)     Restrictive lung disease     Adenomyomatosis of gallbladder     Musculoskeletal back pain

## 2020-12-03 ENCOUNTER — VIRTUAL VISIT (OUTPATIENT)
Dept: INTERNAL MEDICINE | Age: 65
End: 2020-12-03
Payer: COMMERCIAL

## 2020-12-03 PROCEDURE — 99443 PR PHYS/QHP TELEPHONE EVALUATION 21-30 MIN: CPT | Performed by: STUDENT IN AN ORGANIZED HEALTH CARE EDUCATION/TRAINING PROGRAM

## 2020-12-03 NOTE — PROGRESS NOTES
Attending Physician Statement  I have discussed the care of Nia Lieu including pertinent history and exam findings,  with the resident. I have reviewed the key elements of all parts of the encounter with the resident. I agree with the assessment, plan and orders as documented by the resident.   (GE Modifier)    MD EMILY Meadows  Attending Physician, [address] [place] [address], Internal Medicine Residency Program  [address] [place] [address]  12/3/2020, 2:52 PM

## 2020-12-03 NOTE — PATIENT INSTRUCTIONS
NURSE VISIT : 12/18/2020 at 2:30PM for Hepatitis B. Call to schedule appt with GI:  [facility]- Enrique Shah MD   [address] [place] [address], [address] [place] [address]   [phone]     Return To Clinic around 3/3/2021 for 3 month follow up. Patient was added to wait list. Patient will be contacted by office to schedule upcoming appointment with the physician. After Visit Summary mailed to the patient along with appointment card and all other paperwork/orders. The medication list included in this document is our record of what you are currently taking, including any changes that were made at today's visit. If you find any differences when compared to your medications at home, or have any questions that were not answered at your visit, please contact the office. It is very important for your care that you keep your appointment. If for some reason you are unable to keep your appointment, it is equally important that you call our office at [phone] to cancel your appointment and reschedule. Failure to do so may result in your termination from our practice. Script for Shignrix was printed and mailed to patient.  Patient was instructed to take script(s) to pharmacy to get filled.    -CASEY Reyes

## 2020-12-03 NOTE — PROGRESS NOTES
An Owen is a 60 y.o. male evaluated via telephone on 12/3/2020. Consent:  He and/or health care decision maker is aware that that he may receive a bill for this telephone service, depending on his insurance coverage, and has provided verbal consent to proceed: Yes      Documentation:  I communicated with the patient and/or health care decision maker about patient's complaint of right upper quadrant pain. Details of this discussion including any medical advice provided: The patient is a 60-year-old male who has persistent complaint of right upper quadrant region. In the previous encounter, it was thought that the pain is musculoskeletal.  The patient was given Flexeril. The patient stated that the medication has not helped him with the pain. The patient has past medical history of alcoholism and chronic hepatitis C. The patient follows up with gastroenterology as outpatient as well. The patient stated that the pain is worsened when he turns too fast or sits down. There is no relationship of the pain to eating or drinking or any bowel habits. The patient states that he does not feel the pain on slight or deep palpation. He had an ultrasound done which showed adenomyosis of the gallbladder. The patient states that he is not losing any weight. The patient has no changes in his bowel habits. The only issue that he has is complaining the right side of the abdomen. Discussed with the patient in detail that the patient follows gastroenterology. He needs to follow-up with them again for his treatment of hepatitis C. Also discussed with the patient that we will order a CT scan of the abdomen with contrast in order to rule out any underlying pathology that is causing the pain. Based on patient's presentation, it does not seem to be a musculoskeletal pain. The patient was also prescribed Flexeril which did not help the pain.     Discussed with the patient in detail that the patient needs to follow-up with gastroenterology. I affirm this is a Patient Initiated Episode with a Patient who has not had a related appointment within my department in the past 7 days or scheduled within the next 24 hours.     Patient identification was verified at the start of the visit: Yes    Total Time: minutes: 21-30 minutes    Note: not billable if this call serves to triage the patient into an appointment for the relevant concern    Keron Zamora MD  PGY-3, Department of Internal Medicine  [place], [place]  12/3/2020 2:35 PM

## 2020-12-18 ENCOUNTER — NURSE ONLY (OUTPATIENT)
Dept: INTERNAL MEDICINE | Age: 65
End: 2020-12-18
Payer: COMMERCIAL

## 2020-12-18 PROCEDURE — G0010 ADMIN HEPATITIS B VACCINE: HCPCS | Performed by: INTERNAL MEDICINE

## 2020-12-18 NOTE — PROGRESS NOTES
Pt is here for   Hep b 2nd vaccine    Pt presents with no complaints. Hep b  given in   la. Tolerated immunization well, VIS given to patient.     Lot#: [id number]    Exp date [id number]     . Art 47 # [id number]    tv

## 2021-01-25 ENCOUNTER — TELEPHONE (OUTPATIENT)
Dept: INTERNAL MEDICINE | Age: 66
End: 2021-01-25

## 2021-01-25 NOTE — LETTER
[address]  [address] [address]  Phone: [phone]  Fax: [phone]    Rabia Davies MD        January 25, 2021    [address] [place] [address]      Dear Sabina Pak: This letter is a reminder that you may have diagnostic testing that has not been completed. It is important to your well-being that these test(s) are performed. Please find the outstanding test(s) attached. If you could please have these completed before your next appointment. You can have the test completed at any [address] [place] [address] or Lab. Please see the order for scheduling instructions. Any testing that needs completed other than blood work or xray's please call [phone] to schedule an appointment. Otherwise can be done at any [facility]. Please call our office at Dept: [phone] for additional information on the outstanding tests or let us know if they have been completed so we may update your chart. If you have any questions or concerns, please don't hesitate to call.     Sincerely,        Rabia Davies MD

## 2021-02-04 DIAGNOSIS — K21.9 GASTROESOPHAGEAL REFLUX DISEASE WITHOUT ESOPHAGITIS: ICD-10-CM

## 2021-02-04 DIAGNOSIS — I10 ESSENTIAL HYPERTENSION: ICD-10-CM

## 2021-02-04 DIAGNOSIS — R73.03 PRE-DIABETES: ICD-10-CM

## 2021-02-05 NOTE — TELEPHONE ENCOUNTER
Multiple meds pended    Pt on the wait list          Next Visit Date:  No future appointments. Health Maintenance   Topic Date Due    Shingles Vaccine (1 of 2) 01/15/2006    Pneumococcal 65+ years Vaccine (1 of 1 - PPSV23) 02/26/2021    Hepatitis A vaccine (2 of 2 - Risk 2-dose series) 04/08/2021    Hepatitis B vaccine (3 of 3 - Risk 3-dose series) 04/18/2021    A1C test (Diabetic or Prediabetic)  10/08/2021    Potassium monitoring  10/29/2021    Creatinine monitoring  10/29/2021    Lipid screen  12/01/2022    Colon cancer screen fecal DNA test (Cologuard)  01/28/2023    DTaP/Tdap/Td vaccine (2 - Td) 02/26/2026    Flu vaccine  Completed    AAA screen  Completed    HIV screen  Completed    Hib vaccine  Aged Out    Meningococcal (ACWY) vaccine  Aged Out       Hemoglobin A1C (%)   Date Value   10/08/2020 5.8   06/12/2019 5.4   05/25/2018 6.0             ( goal A1C is < 7)   Microalb/Crt.  Ratio (mcg/mg creat)   Date Value   08/29/2018 16     LDL Cholesterol (mg/dL)   Date Value   12/01/2017 61   09/01/2017 58       (goal LDL is <100)   AST (U/L)   Date Value   10/29/2020 22     ALT (U/L)   Date Value   10/29/2020 24     BUN (mg/dL)   Date Value   10/29/2020 16     BP Readings from Last 3 Encounters:   10/08/20 126/69   10/30/19 137/85   09/05/19 124/76          (goal 120/80)    All Future Testing planned in [facility]  Lab Frequency Next Occurrence   Lipid, Fasting Once 03/25/2021   AFP Tumor Marker Once 04/25/2021   Low Dose Chest CT -Abnormal Lung Screen Follow up Once 01/23/2021               Patient Active Problem List:     Essential hypertension     Cigarette smoker     Alcohol abuse     Intractable migraine without status migrainosus     Emphysema lung seen on Low Dose CT Chest 9/2019 [facility])     Chronic hepatitis C without hepatic coma (HCC)     Restrictive lung disease     Adenomyomatosis of gallbladder     Musculoskeletal back pain

## 2021-02-08 RX ORDER — LOSARTAN POTASSIUM AND HYDROCHLOROTHIAZIDE 25; 100 MG/1; MG/1
TABLET ORAL
Qty: 15 TABLET | Refills: 5 | Status: SHIPPED | OUTPATIENT
Start: 2021-02-08 | End: 2021-05-06 | Stop reason: ALTCHOICE

## 2021-02-08 RX ORDER — FAMOTIDINE 20 MG/1
TABLET, FILM COATED ORAL
Qty: 60 TABLET | Refills: 5 | Status: SHIPPED | OUTPATIENT
Start: 2021-02-08 | End: 2021-09-20 | Stop reason: SDUPTHER

## 2021-03-28 DIAGNOSIS — R51.9 CHRONIC INTRACTABLE HEADACHE, UNSPECIFIED HEADACHE TYPE: ICD-10-CM

## 2021-03-28 DIAGNOSIS — G89.29 CHRONIC INTRACTABLE HEADACHE, UNSPECIFIED HEADACHE TYPE: ICD-10-CM

## 2021-03-28 DIAGNOSIS — M25.512 ACUTE PAIN OF LEFT SHOULDER: ICD-10-CM

## 2021-03-29 RX ORDER — IBUPROFEN 800 MG/1
TABLET ORAL
Qty: 120 TABLET | Refills: 0 | Status: SHIPPED | OUTPATIENT
Start: 2021-03-29 | End: 2022-04-06

## 2021-03-29 RX ORDER — BUTALBITAL, ACETAMINOPHEN AND CAFFEINE 50; 325; 40 MG/1; MG/1; MG/1
TABLET ORAL
Qty: 30 TABLET | Refills: 4 | Status: SHIPPED | OUTPATIENT
Start: 2021-03-29 | End: 2021-11-24 | Stop reason: SDUPTHER

## 2021-03-29 NOTE — TELEPHONE ENCOUNTER
Request for Caffeine and Ibuprofen. Last Visit Date: 12/3/2020  Next Visit Date:  Future Appointments   Date Time Provider Cy Quinn   4/1/2021  2:45 PM Wesley Weiss MD [facility] IM [address] Maintenance   Topic Date Due    COVID-19 Vaccine (1) Never done    Shingles Vaccine (1 of 2) Never done    Pneumococcal 65+ years Vaccine (1 of 1 - PPSV23) 02/26/2021    Hepatitis A vaccine (2 of 2 - Risk 2-dose series) 04/08/2021    Hepatitis B vaccine (3 of 3 - Risk 3-dose series) 04/18/2021    A1C test (Diabetic or Prediabetic)  10/08/2021    Potassium monitoring  10/29/2021    Creatinine monitoring  10/29/2021    Lipid screen  12/01/2022    Colon cancer screen fecal DNA test (Cologuard)  01/28/2023    DTaP/Tdap/Td vaccine (2 - Td) 02/26/2026    Flu vaccine  Completed    AAA screen  Completed    HIV screen  Completed    Hib vaccine  Aged Out    Meningococcal (ACWY) vaccine  Aged Out       Hemoglobin A1C (%)   Date Value   10/08/2020 5.8   06/12/2019 5.4   05/25/2018 6.0             ( goal A1C is < 7)   Microalb/Crt.  Ratio (mcg/mg creat)   Date Value   08/29/2018 16     LDL Cholesterol (mg/dL)   Date Value   12/01/2017 61       (goal LDL is <100)   AST (U/L)   Date Value   10/29/2020 22     ALT (U/L)   Date Value   10/29/2020 24     BUN (mg/dL)   Date Value   10/29/2020 16     BP Readings from Last 3 Encounters:   10/08/20 126/69   10/30/19 137/85   09/05/19 124/76          (goal 120/80)    All Future Testing planned in [facility]  Lab Frequency Next Occurrence   Lipid, Fasting Once 03/25/2021   AFP Tumor Marker Once 04/25/2021         Patient Active Problem List:     Essential hypertension     Cigarette smoker     Alcohol abuse     Intractable migraine without status migrainosus     Emphysema lung seen on Low Dose CT Chest 9/2019 [facility])     Chronic hepatitis C without hepatic coma (HCC)     Restrictive lung disease     Adenomyomatosis of gallbladder     Musculoskeletal back pain

## 2021-04-01 ENCOUNTER — OFFICE VISIT (OUTPATIENT)
Dept: INTERNAL MEDICINE | Age: 66
End: 2021-04-01
Payer: MEDICARE

## 2021-04-01 VITALS
WEIGHT: 176 LBS | HEART RATE: 78 BPM | HEIGHT: 68 IN | BODY MASS INDEX: 26.67 KG/M2 | SYSTOLIC BLOOD PRESSURE: 146 MMHG | DIASTOLIC BLOOD PRESSURE: 86 MMHG

## 2021-04-01 DIAGNOSIS — G43.719 INTRACTABLE CHRONIC MIGRAINE WITHOUT AURA AND WITHOUT STATUS MIGRAINOSUS: ICD-10-CM

## 2021-04-01 DIAGNOSIS — J43.9 PULMONARY EMPHYSEMA, UNSPECIFIED EMPHYSEMA TYPE (HCC): ICD-10-CM

## 2021-04-01 DIAGNOSIS — K70.31 ALCOHOLIC CIRRHOSIS OF LIVER WITH ASCITES (HCC): ICD-10-CM

## 2021-04-01 DIAGNOSIS — Z23 NEED FOR PROPHYLACTIC VACCINATION AGAINST STREPTOCOCCUS PNEUMONIAE (PNEUMOCOCCUS): ICD-10-CM

## 2021-04-01 DIAGNOSIS — Z13.220 SCREENING FOR LIPID DISORDERS: ICD-10-CM

## 2021-04-01 DIAGNOSIS — Z23 NEED FOR PROPHYLACTIC VACCINATION AND INOCULATION AGAINST VARICELLA: ICD-10-CM

## 2021-04-01 DIAGNOSIS — I10 ESSENTIAL HYPERTENSION: Primary | ICD-10-CM

## 2021-04-01 DIAGNOSIS — Z13.6 ENCOUNTER FOR ABDOMINAL AORTIC ANEURYSM (AAA) SCREENING: ICD-10-CM

## 2021-04-01 DIAGNOSIS — I51.89 DIASTOLIC DYSFUNCTION: ICD-10-CM

## 2021-04-01 DIAGNOSIS — R73.03 PREDIABETES: ICD-10-CM

## 2021-04-01 DIAGNOSIS — B18.2 CHRONIC HEPATITIS C WITHOUT HEPATIC COMA (HCC): ICD-10-CM

## 2021-04-01 DIAGNOSIS — F17.210 CIGARETTE SMOKER: ICD-10-CM

## 2021-04-01 PROCEDURE — 99213 OFFICE O/P EST LOW 20 MIN: CPT | Performed by: STUDENT IN AN ORGANIZED HEALTH CARE EDUCATION/TRAINING PROGRAM

## 2021-04-01 PROCEDURE — 99211 OFF/OP EST MAY X REQ PHY/QHP: CPT | Performed by: INTERNAL MEDICINE

## 2021-04-01 PROCEDURE — G0009 ADMIN PNEUMOCOCCAL VACCINE: HCPCS | Performed by: INTERNAL MEDICINE

## 2021-04-01 RX ORDER — BLOOD PRESSURE TEST KIT
1 KIT MISCELLANEOUS DAILY
Qty: 1 KIT | Refills: 0 | Status: SHIPPED | OUTPATIENT
Start: 2021-04-01 | End: 2021-04-01 | Stop reason: SDUPTHER

## 2021-04-01 RX ORDER — BLOOD PRESSURE TEST KIT
1 KIT MISCELLANEOUS DAILY
Qty: 1 KIT | Refills: 0 | Status: SHIPPED | OUTPATIENT
Start: 2021-04-01 | End: 2021-05-06

## 2021-04-01 RX ORDER — FUROSEMIDE 20 MG/1
20 TABLET ORAL DAILY
Qty: 60 TABLET | Refills: 3 | Status: CANCELLED | OUTPATIENT
Start: 2021-04-01

## 2021-04-01 ASSESSMENT — PATIENT HEALTH QUESTIONNAIRE - PHQ9: SUM OF ALL RESPONSES TO PHQ9 QUESTIONS 1 & 2: 0

## 2021-04-01 ASSESSMENT — ENCOUNTER SYMPTOMS
COUGH: 0
NAUSEA: 0
ABDOMINAL PAIN: 1
CONSTIPATION: 0
COLOR CHANGE: 0
WHEEZING: 0
SHORTNESS OF BREATH: 1
ABDOMINAL DISTENTION: 1
CHEST TIGHTNESS: 0
VOMITING: 0
DIARRHEA: 0
BACK PAIN: 0

## 2021-04-01 NOTE — PROGRESS NOTES
Attending Physician Statement  I have discussed the care of Ronen Rout including pertinent history and exam findings,  with the resident. I have reviewed the key elements of all parts of the encounter with the resident. I agree with the assessment, plan and orders as documented by the resident. (GE Modifier)    Liver cirrhosis, diastolic heart failure and uncontrolled hypertension and possible ascites.    Will get CMP today : depending the kidney function will switch his hypertensive medications to aldactone and lasix instead of hyzaar  Gi referral for varices screening       MD EMILY Ambrocio  Attending Physician, [facility]   Faculty, Internal Medicine Residency Program  [address] [place] [address]  4/1/2021, 3:31 PM

## 2021-04-01 NOTE — PROGRESS NOTES
[facility] [address] [address] [facility] [address]  Dept: [phone]  Dept Fax: [phone]    Office Progress/Follow Up Note  Date ofpatient's visit: 4/1/2021  Patient's Name:  Trena Lee YOB: 1955            Patient Care Team:  Miguel Mendez MD as PCP - General (Internal Medicine)  Kelsea Riggs MD as Consulting Physician (Pulmonology)  Lucille Mendez RN as Nurse Anil Massey MD as Consulting Physician (Gastroenterology)  ================================================================    REASON FOR VISIT/CHIEF COMPLAINT:  3 Month Follow-Up, Hypertension, Constipation (stool is hard ), Shortness of Breath, and Health Maintenance (pneumo pended / shingles pended )    HISTORY OF PRESENTING ILLNESS:  History was obtained from: patient, electronic medical record. Miki Reed a 73 y.o. is here for a follow-up of hypertension. The patient is a known case of hypertension and takes Hyzaar with Lopressor 25 mg twice daily at home. The patient had his echo done in September 2017 which showed normal ejection fraction with diastolic dysfunction. The patient also has history of chronic hepatitis C with genotype 1a, 1B. The patient is a known case of cirrhosis based on the ultrasound liver recently. His HCVRNA was detectable in October 2020. The patient is a candidate for treatment and followed up with GI in 2019 but was lost to follow-up. He also has adenomyomatosis of gallbladder. The patient does complain of nonspecific abdominal pain related to his adenomyomatosis of gallbladder as well as chronic hepatitis C. The patient experienced that for his migraine headaches. The patient also has nonspecific musculoskeletal pain for which she takes ibuprofen. The patient has emphysematous lung secondary to his smoking which was found on CT scan of the lung for lung cancer screening.   The patient does complain of shortness of breath related to his smoking. He takes albuterol, Spiriva, Flonase and Mucinex. His PFTs were done in 2018 which showed restrictive lung disease. The patient has prediabetes with hemoglobin A1c of 5.8 in October 2020. The patient is on Metformin 500 mg. The patient states that he has cut down his smoking to 3 cigarettes a day. He denies alcohol use and recreational drug use at this time. Guarding health maintenance, the patient is a candidate for AAA screen. His lung cancer screening was done in October 2020. The patient had a negative Cologuard in 2020. The patient's last lipid panel was done in 2017 and was negative. The patient was screened for HIV negative in October 2020. Patient Active Problem List   Diagnosis    Essential hypertension    Cigarette smoker    Alcohol abuse    Intractable migraine without status migrainosus    Emphysema lung seen on Low Dose CT Chest 9/2019 [facility])    Chronic hepatitis C without hepatic coma (HCC)    Restrictive lung disease    Adenomyomatosis of gallbladder    Musculoskeletal back pain    Diastolic dysfunction    Alcoholic cirrhosis of liver with ascites ([facility] [address]    Prediabetes       Health Maintenance Due   Topic Date Due    COVID-19 Vaccine (1) Never done    Shingles Vaccine (1 of 2) Never done    Pneumococcal 65+ years Vaccine (1 of 1 - PPSV23) 02/26/2021       No Known Allergies      Current Outpatient Medications   Medication Sig Dispense Refill    ibuprofen (ADVIL;MOTRIN) 800 MG tablet TAKE 1 TABLET BY MOUTH EVERY 6 HOURS AS NEEDED FOR PAIN 120 tablet 0    butalbital-acetaminophen-caffeine (FIORICET, ESGIC) -40 MG per tablet TAKE ONE TABLET BY MOUTH EVERY 12 HOURS AS NEEDED FOR HEADACHE . DO NOT DRIVE IF TAKING THIS MEDICATION. WILL CAUSE DROWSINESS.  30 tablet 4    metFORMIN (GLUCOPHAGE) 500 MG tablet TAKE 1 TABLET BY MOUTH TWICE DAILY WITH MEALS 60 tablet 1    famotidine (PEPCID) 20 MG tablet TAKE 1 TABLET BY MOUTH 2 TIMES A DAY 60 tablet 5    losartan-hydroCHLOROthiazide (HYZAAR) 100-25 MG per tablet TAKE 1 TABLET BY MOUTH ONE TIME DAILY **NOTE NEW DIRECTIONS** 15 tablet 5    albuterol sulfate  (90 Base) MCG/ACT inhaler INHALE 2 PUFFS INTO THE LUNGS EVERY 6 HOURS AS NEEDED FOR WHEEZING 18 g 3    tiotropium (SPIRIVA RESPIMAT) 2.5 MCG/ACT AERS inhaler Inhale 2 puffs into the lungs daily 2 Inhaler 3    metoprolol tartrate (LOPRESSOR) 25 MG tablet TAKE ONE-HALF TABLET BY MOUTH TWO TIMES A DAY 60 tablet 4    aspirin (ASPIRIN ADULT LOW STRENGTH) 81 MG EC tablet TAKE 1 TABLET BY MOUTH ONE TIME A DAY 30 tablet 5    fluticasone (FLONASE) 50 MCG/ACT nasal spray 1 spray by Each Nare route daily 1 Spray in each nostril 2 Bottle 1    sodium chloride (ALTAMIST SPRAY) 0.65 % nasal spray 1 spray by Nasal route as needed for Congestion 1 Bottle 3    guaiFENesin 400 MG tablet Take 1 tablet by mouth 4 times daily as needed for Cough 56 tablet 0     No current facility-administered medications for this visit. Social History     Tobacco Use    Smoking status: Current Every Day Smoker     Packs/day: 0.25     Years: 40.00     Pack years: 10.00     Types: Cigarettes     Start date: 2/26/1977    Smokeless tobacco: Former User    Tobacco comment: 4 per day    Substance Use Topics    Alcohol use: Yes     Alcohol/week: 14.0 standard drinks     Types: 14 Cans of beer per week     Comment: somedays    Drug use: No       Family History   Problem Relation Age of Onset    Heart Disease Mother     High Blood Pressure Mother     Stroke Father     High Blood Pressure Father     Cancer Sister     Heart Disease Sister         REVIEW OF SYSTEMS:  Review of Systems   Constitutional: Negative for chills, fatigue and fever. Respiratory: Positive for shortness of breath. Negative for cough, chest tightness and wheezing. Cardiovascular: Negative for chest pain, palpitations and leg swelling.    Gastrointestinal: Positive for abdominal distention and abdominal pain. Negative for constipation, diarrhea, nausea and vomiting. Genitourinary: Negative for difficulty urinating. Musculoskeletal: Negative for arthralgias, back pain, gait problem and joint swelling. Skin: Negative for color change, pallor, rash and wound. Neurological: Negative for dizziness and headaches. Psychiatric/Behavioral: Negative for agitation. PHYSICAL EXAM:  Vitals:    04/01/21 1435 04/01/21 1443 04/01/21 1508   BP: (!) 156/85 (!) 141/86 (!) 146/86   Site: Left Upper Arm Right Upper Arm Right Upper Arm   Position: Sitting Sitting Sitting   Cuff Size: Medium Adult Medium Adult Medium Adult   Pulse: 83 86 78   Weight: 176 lb (79.8 kg)     Height: 5' 8\" (1.727 m)       BP Readings from Last 3 Encounters:   04/01/21 (!) 146/86   10/08/20 126/69   10/30/19 137/85        Physical Exam  Constitutional:       Appearance: Normal appearance. Cardiovascular:      Rate and Rhythm: Normal rate and regular rhythm. Pulmonary:      Effort: Pulmonary effort is normal. No respiratory distress. Breath sounds: Normal breath sounds. No stridor. No wheezing. Abdominal:      General: There is distension. Palpations: There is no mass. Tenderness: There is no abdominal tenderness. Hernia: No hernia is present. Musculoskeletal: Normal range of motion. General: No swelling or tenderness. Right lower leg: No edema. Left lower leg: No edema. Skin:     General: Skin is warm and dry. Neurological:      General: No focal deficit present. Mental Status: He is alert and oriented to person, place, and time.    Psychiatric:         Mood and Affect: Mood normal.         Behavior: Behavior normal.           DIAGNOSTIC FINDINGS:  CBC:  Lab Results   Component Value Date    WBC 11.1 10/29/2020    HGB 11.7 10/29/2020     10/29/2020       BMP:    Lab Results   Component Value Date     10/29/2020    K 3.8 10/29/2020     10/29/2020    CO2 20 10/29/2020    BUN 16 10/29/2020    CREATININE 1.12 10/29/2020    GLUCOSE 148 10/29/2020       HEMOGLOBIN A1C:   Lab Results   Component Value Date    LABA1C 5.8 10/08/2020       FASTING LIPID PANEL:  Lab Results   Component Value Date    CHOL 139 12/01/2017    HDL 57 12/01/2017    TRIG 104 12/01/2017       ASSESSMENT AND PLAN:  Eugenio Banks was seen today for 3 month follow-up, hypertension, constipation, shortness of breath and health maintenance. Diagnoses and all orders for this visit:    Essential hypertension  Continue Hyzaar and Lopressor. Blood pressure machine ordered. Diastolic dysfunction    Pulmonary emphysema, unspecified emphysema type ([address]  Commended smoking cessation. Continue albuterol, Spiriva inhalers. Chronic hepatitis C without hepatic coma ([address]  The patient was emphasized to follow-up with GI for hepatitis C treatment. Patient verbalizes understanding. Repeat CMP ordered. Alcoholic cirrhosis of liver with ascites ([address]  The patient was emphasized to follow-up with GI for hepatitis C treatment. Patient verbalizes understanding. Prediabetes  Continue Metformin. Intractable chronic migraine without aura and without status migrainosus  Continue Fioricet. Cigarette smoker    Need for prophylactic vaccination against Streptococcus pneumoniae (pneumococcus)  -     Pneumococcal polysaccharide vaccine 23-valent PPSV23  -     [place] IMMUNIZ ADMIN,1 SINGLE/COMB VAC/TOXOID    Need for prophylactic vaccination and inoculation against varicella    Encounter for abdominal aortic aneurysm (AAA) screening  Ultrasound for AAA screen ordered. Screening for lipid disorders  Lipid Panel ordered. Patient will be a candidate for lung cancer screening in 6 months. Next colon cancer screening due in 2023. FOLLOW UP AND INSTRUCTIONS:  Return in about 6 months (around 10/1/2021).     · Eugenio Banks received counseling on the following healthy behaviors: nutrition, exercise, medication adherence and tobacco cessation    · Discussed use, benefit, and side effects of prescribed medications. Barriers to medication compliance addressed. All patient questions answered. Pt voiced understanding. · Patient given educational materials - see patient instructions    Claire Phillips MD  PGY-3, Department of Internal Medicine  [place], [place]  4/1/2021 3:20 PM    This note is created with the assistance of a speech-recognition program. While intending to generate a document that actually reflects the content of thevisit, the document can still have some mistakes which may not have been identified and corrected by editing.

## 2021-04-01 NOTE — PATIENT INSTRUCTIONS
Labs given to patient, they will have them done before their next visit. Medications e-scribed to pharmacy of patient's choice. Order for    AAA SCREENING faxed to [place] Scheduling they will all pt for appt. Please call [phone] in not heard within 2 weeks. Follow-up appointment scheduled for 05/06/2021     , AVS given to patient.       TV

## 2021-04-07 ENCOUNTER — HOSPITAL ENCOUNTER (OUTPATIENT)
Age: 66
Setting detail: SPECIMEN
Discharge: HOME | End: 2021-04-07

## 2021-04-07 ENCOUNTER — TELEPHONE (OUTPATIENT)
Dept: INTERNAL MEDICINE | Age: 66
End: 2021-04-07

## 2021-04-07 DIAGNOSIS — K70.31 ALCOHOLIC CIRRHOSIS OF LIVER WITH ASCITES (HCC): ICD-10-CM

## 2021-04-07 DIAGNOSIS — Z13.220 SCREENING FOR LIPID DISORDERS: ICD-10-CM

## 2021-04-07 DIAGNOSIS — B18.2 CHRONIC HEPATITIS C WITHOUT HEPATIC COMA (HCC): ICD-10-CM

## 2021-04-07 LAB
ALBUMIN SERPL-MCNC: 4.1 G/DL (ref 3.5–5.2)
ALBUMIN/GLOBULIN RATIO: 1.1 (ref 1–2.5)
ALP BLD-CCNC: 127 U/L (ref 40–129)
ALT SERPL-CCNC: 29 U/L (ref 5–41)
ANION GAP SERPL CALCULATED.3IONS-SCNC: 13 MMOL/L (ref 9–17)
AST SERPL-CCNC: 26 U/L
BILIRUB SERPL-MCNC: 0.37 MG/DL (ref 0.3–1.2)
BUN BLDV-MCNC: 11 MG/DL (ref 8–23)
BUN/CREAT BLD: ABNORMAL (ref 9–20)
CALCIUM SERPL-MCNC: 9.1 MG/DL (ref 8.6–10.4)
CHLORIDE BLD-SCNC: 107 MMOL/L (ref 98–107)
CHOLESTEROL/HDL RATIO: 2.3
CHOLESTEROL: 146 MG/DL
CO2: 18 MMOL/L (ref 20–31)
CREAT SERPL-MCNC: 0.87 MG/DL (ref 0.7–1.2)
GFR AFRICAN AMERICAN: >60 ML/MIN
GFR NON-AFRICAN AMERICAN: >60 ML/MIN
GFR SERPL CREATININE-BSD FRML MDRD: ABNORMAL ML/MIN/{1.73_M2}
GFR SERPL CREATININE-BSD FRML MDRD: ABNORMAL ML/MIN/{1.73_M2}
GLUCOSE BLD-MCNC: 111 MG/DL (ref 70–99)
HDLC SERPL-MCNC: 64 MG/DL
LDL CHOLESTEROL: 70 MG/DL (ref 0–130)
POTASSIUM SERPL-SCNC: 3.5 MMOL/L (ref 3.7–5.3)
SODIUM BLD-SCNC: 138 MMOL/L (ref 135–144)
TOTAL PROTEIN: 7.7 G/DL (ref 6.4–8.3)
TRIGL SERPL-MCNC: 59 MG/DL
VLDLC SERPL CALC-MCNC: NORMAL MG/DL (ref 1–30)

## 2021-04-07 NOTE — TELEPHONE ENCOUNTER
PA request received for Butalbital-APAP-Caffeine -40MG tablets    PA processed and submitted to pt insurance, waiting for response in regards to medication coverage

## 2021-04-20 ENCOUNTER — HOSPITAL ENCOUNTER (OUTPATIENT)
Dept: VASCULAR LAB | Age: 66
Discharge: HOME | End: 2021-04-20
Payer: MEDICARE

## 2021-04-20 DIAGNOSIS — Z13.6 ENCOUNTER FOR ABDOMINAL AORTIC ANEURYSM (AAA) SCREENING: ICD-10-CM

## 2021-04-20 PROCEDURE — 76706 US ABDL AORTA SCREEN AAA: CPT

## 2021-05-06 ENCOUNTER — OFFICE VISIT (OUTPATIENT)
Dept: INTERNAL MEDICINE | Age: 66
End: 2021-05-06
Payer: COMMERCIAL

## 2021-05-06 VITALS
SYSTOLIC BLOOD PRESSURE: 149 MMHG | BODY MASS INDEX: 27.28 KG/M2 | HEIGHT: 68 IN | HEART RATE: 72 BPM | WEIGHT: 180 LBS | DIASTOLIC BLOOD PRESSURE: 86 MMHG

## 2021-05-06 DIAGNOSIS — F17.210 CIGARETTE SMOKER: ICD-10-CM

## 2021-05-06 DIAGNOSIS — E87.6 HYPOKALEMIA: ICD-10-CM

## 2021-05-06 DIAGNOSIS — K70.31 ALCOHOLIC CIRRHOSIS OF LIVER WITH ASCITES (HCC): ICD-10-CM

## 2021-05-06 DIAGNOSIS — D13.5 ADENOMYOMATOSIS OF GALLBLADDER: ICD-10-CM

## 2021-05-06 DIAGNOSIS — G43.719 INTRACTABLE CHRONIC MIGRAINE WITHOUT AURA AND WITHOUT STATUS MIGRAINOSUS: ICD-10-CM

## 2021-05-06 DIAGNOSIS — B18.2 CHRONIC HEPATITIS C WITHOUT HEPATIC COMA (HCC): ICD-10-CM

## 2021-05-06 DIAGNOSIS — R73.03 PREDIABETES: ICD-10-CM

## 2021-05-06 DIAGNOSIS — J43.9 PULMONARY EMPHYSEMA, UNSPECIFIED EMPHYSEMA TYPE (HCC): ICD-10-CM

## 2021-05-06 DIAGNOSIS — I51.89 DIASTOLIC DYSFUNCTION: ICD-10-CM

## 2021-05-06 DIAGNOSIS — I10 ESSENTIAL HYPERTENSION: Primary | ICD-10-CM

## 2021-05-06 PROCEDURE — 99213 OFFICE O/P EST LOW 20 MIN: CPT | Performed by: STUDENT IN AN ORGANIZED HEALTH CARE EDUCATION/TRAINING PROGRAM

## 2021-05-06 RX ORDER — AMLODIPINE BESYLATE 5 MG/1
5 TABLET ORAL DAILY
Qty: 30 TABLET | Refills: 3 | Status: CANCELLED | OUTPATIENT
Start: 2021-05-06

## 2021-05-06 RX ORDER — SPIRONOLACTONE 100 MG/1
100 TABLET, FILM COATED ORAL DAILY
Qty: 30 TABLET | Refills: 3 | Status: SHIPPED | OUTPATIENT
Start: 2021-05-06 | End: 2021-09-24

## 2021-05-06 RX ORDER — FUROSEMIDE 20 MG/1
20 TABLET ORAL DAILY
Qty: 60 TABLET | Refills: 3 | Status: SHIPPED | OUTPATIENT
Start: 2021-05-06 | End: 2022-04-06 | Stop reason: SINTOL

## 2021-05-06 RX ORDER — ASPIRIN 81 MG/1
TABLET ORAL
Qty: 30 TABLET | Refills: 5 | Status: CANCELLED | OUTPATIENT
Start: 2021-05-06

## 2021-05-06 RX ORDER — ALBUTEROL SULFATE 90 UG/1
2 AEROSOL, METERED RESPIRATORY (INHALATION) EVERY 6 HOURS PRN
Qty: 18 G | Refills: 3 | Status: SHIPPED | OUTPATIENT
Start: 2021-05-06 | End: 2021-10-21

## 2021-05-06 ASSESSMENT — PATIENT HEALTH QUESTIONNAIRE - PHQ9
1. LITTLE INTEREST OR PLEASURE IN DOING THINGS: 0
SUM OF ALL RESPONSES TO PHQ QUESTIONS 1-9: 0
2. FEELING DOWN, DEPRESSED OR HOPELESS: 0
SUM OF ALL RESPONSES TO PHQ9 QUESTIONS 1 & 2: 0
SUM OF ALL RESPONSES TO PHQ QUESTIONS 1-9: 0

## 2021-05-06 ASSESSMENT — ENCOUNTER SYMPTOMS
VOMITING: 0
COLOR CHANGE: 0
NAUSEA: 0
CONSTIPATION: 0
ABDOMINAL DISTENTION: 1
DIARRHEA: 0
ABDOMINAL PAIN: 1
CHEST TIGHTNESS: 0
SHORTNESS OF BREATH: 1
WHEEZING: 0

## 2021-05-06 NOTE — PROGRESS NOTES
from GI in 2019. He also has adenomyomatosis of gallbladder. The patient does complain of nonspecific abdominal pain related to his adenomyomatosis of gallbladder as well as chronic hepatitis C. The patient experienced that for his migraine headaches. The patient also has nonspecific musculoskeletal pain for which she takes ibuprofen. The patient has emphysematous lung secondary to his smoking which was found on CT scan of the lung for lung cancer screening. The patient does complain of shortness of breath related to his smoking. He takes albuterol, Spiriva, Flonase and Mucinex. His PFTs were done in 2018 which showed restrictive lung disease.     The patient has prediabetes with hemoglobin A1c of 5.8 in October 2020. The patient is on Metformin 500 mg.     The patient states that he has cut down his smoking to 3 cigarettes a day. He agrees to alcohol use and denies recreational drug use at this time. Regarding health maintenance screening, the patient had a negative AAA screen recently. His lung cancer screening was done in October 2020 which showed emphysematous lung changes but no signs of any nodules. The patient had a negative Cologuard screening in 2020. The patient's last lipid panel was negative in April 2021. The patient's last HIV screen was negative in October 2020. Considering history of cirrhosis, the patient is a candidate for liver cancer screening as well as variceal screen at this time. The patient was using aspirin for ASCVD risk prophylaxis. The patient is 73years of age. We will stop aspirin use at this time.     Patient Active Problem List   Diagnosis    Essential hypertension    Cigarette smoker    Alcohol abuse    Intractable migraine without status migrainosus    Emphysema lung seen on Low Dose CT Chest 9/2019 [facility]    Chronic hepatitis C without hepatic coma (HCC)    Restrictive lung disease    Adenomyomatosis of gallbladder    Musculoskeletal back pain    No current facility-administered medications for this visit. Social History     Tobacco Use    Smoking status: Current Every Day Smoker     Packs/day: 0.25     Years: 40.00     Pack years: 10.00     Types: Cigarettes     Start date: 2/26/1977    Smokeless tobacco: Former User    Tobacco comment: 4 per day    Substance Use Topics    Alcohol use: Not Currently     Alcohol/week: 14.0 standard drinks     Types: 14 Cans of beer per week     Comment: somedays    Drug use: No       Family History   Problem Relation Age of Onset    Heart Disease Mother     High Blood Pressure Mother     Stroke Father     High Blood Pressure Father     Cancer Sister     Heart Disease Sister         REVIEW OF SYSTEMS:  Review of Systems   Constitutional: Positive for fatigue. Negative for chills and fever. Respiratory: Positive for shortness of breath. Negative for chest tightness and wheezing. Cardiovascular: Negative for chest pain, palpitations and leg swelling. Gastrointestinal: Positive for abdominal distention and abdominal pain. Negative for constipation, diarrhea, nausea and vomiting. Genitourinary: Negative for difficulty urinating. Skin: Negative for color change, pallor, rash and wound. Neurological: Negative for dizziness, light-headedness and numbness. Psychiatric/Behavioral: Negative for agitation, behavioral problems and confusion. PHYSICAL EXAM:  Vitals:    05/06/21 1450 05/06/21 1455 05/06/21 1518   BP: (!) 149/84 (!) 144/85 (!) 149/86   Site: Left Upper Arm Left Upper Arm    Position: Sitting Sitting    Cuff Size: Medium Adult Medium Adult    Pulse: 76 74 72   Weight: 180 lb (81.6 kg)     Height: 5' 8\" (1.727 m)       BP Readings from Last 3 Encounters:   05/06/21 (!) 149/86   04/01/21 (!) 146/86   10/08/20 126/69        Physical Exam  Constitutional:       Appearance: Normal appearance. Cardiovascular:      Rate and Rhythm: Normal rate and regular rhythm. Pulses: Normal pulses. Heart sounds: Normal heart sounds. Pulmonary:      Effort: Pulmonary effort is normal. No respiratory distress. Breath sounds: Normal breath sounds. No stridor. Abdominal:      General: Bowel sounds are normal. There is distension. Palpations: Abdomen is soft. There is no mass. Tenderness: There is no abdominal tenderness. Hernia: No hernia is present. Musculoskeletal: Normal range of motion. General: No swelling or tenderness. Right lower leg: No edema. Left lower leg: No edema. Skin:     General: Skin is warm and dry. Neurological:      General: No focal deficit present. Mental Status: He is alert and oriented to person, place, and time. Psychiatric:         Mood and Affect: Mood normal.         Behavior: Behavior normal.           DIAGNOSTIC FINDINGS:  CBC:  Lab Results   Component Value Date    WBC 11.1 10/29/2020    HGB 11.7 10/29/2020     10/29/2020       BMP:    Lab Results   Component Value Date     04/07/2021    K 3.5 04/07/2021     04/07/2021    CO2 18 04/07/2021    BUN 11 04/07/2021    CREATININE 0.87 04/07/2021    GLUCOSE 111 04/07/2021       HEMOGLOBIN A1C:   Lab Results   Component Value Date    LABA1C 5.8 10/08/2020       FASTING LIPID PANEL:  Lab Results   Component Value Date    CHOL 146 04/07/2021    HDL 64 04/07/2021    TRIG 59 04/07/2021       ASSESSMENT AND PLAN:  Fredis Jj was seen today for hypertension and health maintenance. Diagnoses and all orders for this visit:    Essential hypertension  Surgery elevated blood pressure and hypokalemia, will check renin and aldosterone today. We will stop Hyzaar. Will switch the patient to Lasix 20 and Aldactone 100. Continue Lopressor 25 mg twice daily. Aldactone and Lasix will take care of ascites as well. Prediabetes  Last hemoglobin A1c was 5.8 in October 2020. Will check repeat hemoglobin A1c in the next visit.     Chronic hepatitis C without hepatic coma (HCC)  The

## 2021-05-06 NOTE — PATIENT INSTRUCTIONS
The patient can take 2 g of Tylenol in a day. For example if the Tylenol dosage is 500 mg, it is safe to take Tylenol up to 4 pills a day. Medications e-scribe to pharmacy of pt's choice. Labs given to patient, they will have them done before their next visit. Referral to GI was placed, summary of care printed and faxed to office, phone numbers given to the patient, they will contact office for an appt    Return To Clinic 6/3/2021. After Visit Summary  given and reviewed. It is very important for your care that you keep your appointment. If for some reason you are unable to keep your appointment it is equally important that you call our office at [phone] to cancel your appointment and reschedule. Failure to do so may result in your termination from our practice.     SL

## 2021-05-12 ENCOUNTER — TELEPHONE (OUTPATIENT)
Dept: INTERNAL MEDICINE | Age: 66
End: 2021-05-12

## 2021-05-12 NOTE — TELEPHONE ENCOUNTER
PA request received for Spiriva Respimat 2. 5MCG/ACT aerosol    PA processed and submitted to pt insurance, waiting for response in regards to medication coverage

## 2021-08-23 ENCOUNTER — TELEPHONE (OUTPATIENT)
Dept: INTERNAL MEDICINE | Age: 66
End: 2021-08-23

## 2021-08-23 NOTE — LETTER
[address]  [address] [facility] [address]  Phone: [phone]  Fax: [phone]    Kasey Roman MD        August 23, 2021    [address] [place] [address]      Dear Efrain Crain: This letter is a reminder that you may have diagnostic testing that has not been completed. It is important to your well-being that these test(s) are performed. Please find the outstanding test(s) attached. If you could please have these completed before your next appointment. You can have the test completed at any [facility] facility or Lab. Please see the order for scheduling instructions. Any testing that needs completed other than blood work or xray's please call [phone] to schedule an appointment. Otherwise can be done at any [facility]. Please call our office at Dept: [phone] for additional information on the outstanding tests or let us know if they have been completed so we may update your chart. If you have any questions or concerns, please don't hesitate to call.     Sincerely,        Kasey Roman MD

## 2021-09-16 DIAGNOSIS — K21.9 GASTROESOPHAGEAL REFLUX DISEASE WITHOUT ESOPHAGITIS: ICD-10-CM

## 2021-09-16 DIAGNOSIS — K70.31 ALCOHOLIC CIRRHOSIS OF LIVER WITH ASCITES (HCC): ICD-10-CM

## 2021-09-23 NOTE — TELEPHONE ENCOUNTER
E-scribing request for spironolactone and PEPCID. Pt has future appt. Health Maintenance   Topic Date Due    COVID-19 Vaccine (1) Never done    Shingles Vaccine (1 of 2) Never done    Annual Wellness Visit (AWV)  Never done    Hepatitis A vaccine (2 of 2 - Risk 2-dose series) 04/08/2021    Hepatitis B vaccine (3 of 3 - Risk 3-dose series) 04/18/2021    Flu vaccine (1) 09/01/2021    A1C test (Diabetic or Prediabetic)  10/08/2021    Potassium monitoring  04/07/2022    Creatinine monitoring  04/07/2022    Colon cancer screen fecal DNA test (Cologuard)  01/28/2023    DTaP/Tdap/Td vaccine (2 - Td or Tdap) 02/26/2026    Lipid screen  04/07/2026    Pneumococcal 65+ years Vaccine  Completed    AAA screen  Completed    HIV screen  Completed    Hib vaccine  Aged Out    Meningococcal (ACWY) vaccine  Aged Out             (applicable per patient's age: Cancer Screenings, Depression Screening, Fall Risk Screening, Immunizations)    Hemoglobin A1C (%)   Date Value   10/08/2020 5.8   06/12/2019 5.4   05/25/2018 6.0     Microalb/Crt.  Ratio (mcg/mg creat)   Date Value   08/29/2018 16     LDL Cholesterol (mg/dL)   Date Value   04/07/2021 70     AST (U/L)   Date Value   04/07/2021 26     ALT (U/L)   Date Value   04/07/2021 29     BUN (mg/dL)   Date Value   04/07/2021 11      (goal A1C is < 7)   (goal LDL is <100) need 30-50% reduction from baseline     BP Readings from Last 3 Encounters:   05/06/21 (!) 149/86   04/01/21 (!) 146/86   10/08/20 126/69    (goal /80)      All Future Testing planned in [facility]:  Lab Frequency Next Occurrence   AFP Tumor Marker Once 10/08/2021   Renin Once 11/23/2021   Aldosterone Once 11/23/2021       Next Visit Date:  Future Appointments   Date Time Provider Cy Quinn   11/11/2021  3:00 PM Agustina Nazario MD [facility]            Patient Active Problem List:     Essential hypertension     Cigarette smoker     Alcohol abuse     Intractable migraine without status migrainosus     Emphysema lung seen on Low Dose CT Chest 9/2019 [facility])     Chronic hepatitis C without hepatic coma (HCC)     Restrictive lung disease     Adenomyomatosis of gallbladder     Musculoskeletal back pain     Diastolic dysfunction     Alcoholic cirrhosis of liver with ascites (HCC)     Prediabetes     Hypokalemia

## 2021-09-24 RX ORDER — SPIRONOLACTONE 100 MG/1
TABLET, FILM COATED ORAL
Qty: 30 TABLET | Refills: 3 | Status: SHIPPED | OUTPATIENT
Start: 2021-09-24 | End: 2021-11-24 | Stop reason: ALTCHOICE

## 2021-09-24 RX ORDER — FAMOTIDINE 20 MG/1
TABLET, FILM COATED ORAL
Qty: 60 TABLET | Refills: 11 | Status: SHIPPED | OUTPATIENT
Start: 2021-09-24 | End: 2022-04-06 | Stop reason: SDUPTHER

## 2021-10-07 ENCOUNTER — TELEPHONE (OUTPATIENT)
Dept: ONCOLOGY | Age: 66
End: 2021-10-07

## 2021-10-07 DIAGNOSIS — Z87.891 PERSONAL HISTORY OF NICOTINE DEPENDENCE: Primary | ICD-10-CM

## 2021-10-07 NOTE — TELEPHONE ENCOUNTER
Our records indicate that your patient is coming due for their annual lung cancer screening follow up testing. For your convenience, we have pended the order for the scan for you. If you do not agree with the need for the test, please cancel the order and let us know. Sincerely,    [address] [place] [address] Screening Program    Auto printed reminder letter sent to patient.

## 2021-10-15 DIAGNOSIS — J43.9 PULMONARY EMPHYSEMA, UNSPECIFIED EMPHYSEMA TYPE (HCC): ICD-10-CM

## 2021-10-20 NOTE — TELEPHONE ENCOUNTER
Pt has new to provider appointment coming up / was a dr Cydney Haines pt / pharmacy calling , please advise
disease     Adenomyomatosis of gallbladder     Musculoskeletal back pain     Diastolic dysfunction     Alcoholic cirrhosis of liver with ascites (HCC)     Prediabetes     Hypokalemia

## 2021-10-21 RX ORDER — ALBUTEROL SULFATE 90 UG/1
AEROSOL, METERED RESPIRATORY (INHALATION)
Qty: 18 G | Refills: 3 | Status: SHIPPED | OUTPATIENT
Start: 2021-10-21 | End: 2021-11-24 | Stop reason: SDUPTHER

## 2021-11-01 ENCOUNTER — HOSPITAL ENCOUNTER (OUTPATIENT)
Dept: CT IMAGING | Age: 66
Discharge: HOME | End: 2021-11-03
Payer: COMMERCIAL

## 2021-11-01 DIAGNOSIS — Z87.891 PERSONAL HISTORY OF NICOTINE DEPENDENCE: ICD-10-CM

## 2021-11-01 PROCEDURE — 71271 CT THORAX LUNG CANCER SCR C-: CPT

## 2021-11-24 ENCOUNTER — OFFICE VISIT (OUTPATIENT)
Dept: INTERNAL MEDICINE | Age: 66
End: 2021-11-24
Payer: COMMERCIAL

## 2021-11-24 VITALS
DIASTOLIC BLOOD PRESSURE: 74 MMHG | OXYGEN SATURATION: 98 % | BODY MASS INDEX: 27.13 KG/M2 | SYSTOLIC BLOOD PRESSURE: 149 MMHG | WEIGHT: 179 LBS | HEIGHT: 68 IN | HEART RATE: 80 BPM | TEMPERATURE: 97.7 F

## 2021-11-24 DIAGNOSIS — M54.9 MUSCULOSKELETAL BACK PAIN: ICD-10-CM

## 2021-11-24 DIAGNOSIS — F17.210 CIGARETTE SMOKER: ICD-10-CM

## 2021-11-24 DIAGNOSIS — I10 ESSENTIAL HYPERTENSION: ICD-10-CM

## 2021-11-24 DIAGNOSIS — B18.2 CHRONIC HEPATITIS C WITHOUT HEPATIC COMA (HCC): ICD-10-CM

## 2021-11-24 DIAGNOSIS — Z23 NEED FOR PROPHYLACTIC VACCINATION AND INOCULATION AGAINST VARICELLA: Primary | ICD-10-CM

## 2021-11-24 DIAGNOSIS — G43.001 MIGRAINE WITHOUT AURA AND WITH STATUS MIGRAINOSUS, NOT INTRACTABLE: ICD-10-CM

## 2021-11-24 DIAGNOSIS — J43.9 PULMONARY EMPHYSEMA, UNSPECIFIED EMPHYSEMA TYPE (HCC): ICD-10-CM

## 2021-11-24 DIAGNOSIS — F10.10 ALCOHOL ABUSE: ICD-10-CM

## 2021-11-24 DIAGNOSIS — Z23 FLU VACCINE NEED: ICD-10-CM

## 2021-11-24 PROCEDURE — 90686 IIV4 VACC NO PRSV 0.5 ML IM: CPT | Performed by: INTERNAL MEDICINE

## 2021-11-24 PROCEDURE — 99213 OFFICE O/P EST LOW 20 MIN: CPT | Performed by: STUDENT IN AN ORGANIZED HEALTH CARE EDUCATION/TRAINING PROGRAM

## 2021-11-24 RX ORDER — LOSARTAN POTASSIUM 50 MG/1
50 TABLET ORAL DAILY
Qty: 90 TABLET | Refills: 1 | Status: SHIPPED | OUTPATIENT
Start: 2021-11-24 | End: 2022-04-06 | Stop reason: SDUPTHER

## 2021-11-24 RX ORDER — ACETAMINOPHEN 500 MG
500 TABLET ORAL 2 TIMES DAILY PRN
Qty: 60 TABLET | Refills: 0 | Status: SHIPPED | OUTPATIENT
Start: 2021-11-24 | End: 2022-01-13

## 2021-11-24 RX ORDER — ALBUTEROL SULFATE 90 UG/1
AEROSOL, METERED RESPIRATORY (INHALATION)
Qty: 18 G | Refills: 3 | Status: SHIPPED | OUTPATIENT
Start: 2021-11-24 | End: 2022-04-06 | Stop reason: SDUPTHER

## 2021-11-24 RX ORDER — BLOOD PRESSURE TEST KIT
1 KIT MISCELLANEOUS DAILY
Qty: 1 KIT | Refills: 0 | Status: SHIPPED | OUTPATIENT
Start: 2021-11-24 | End: 2022-07-06

## 2021-11-24 RX ORDER — BUTALBITAL, ACETAMINOPHEN AND CAFFEINE 50; 325; 40 MG/1; MG/1; MG/1
TABLET ORAL
Qty: 30 TABLET | Refills: 4 | Status: SHIPPED | OUTPATIENT
Start: 2021-11-24 | End: 2022-04-06

## 2021-11-24 SDOH — ECONOMIC STABILITY: FOOD INSECURITY: WITHIN THE PAST 12 MONTHS, YOU WORRIED THAT YOUR FOOD WOULD RUN OUT BEFORE YOU GOT MONEY TO BUY MORE.: NEVER TRUE

## 2021-11-24 SDOH — ECONOMIC STABILITY: FOOD INSECURITY: WITHIN THE PAST 12 MONTHS, THE FOOD YOU BOUGHT JUST DIDN'T LAST AND YOU DIDN'T HAVE MONEY TO GET MORE.: NEVER TRUE

## 2021-11-24 ASSESSMENT — ENCOUNTER SYMPTOMS
RECTAL PAIN: 0
ALLERGIC/IMMUNOLOGIC NEGATIVE: 1
DIARRHEA: 0
VOMITING: 0
APNEA: 0
CONSTIPATION: 0
ABDOMINAL DISTENTION: 0
CHOKING: 0
GASTROINTESTINAL NEGATIVE: 1
BLOOD IN STOOL: 0
ABDOMINAL PAIN: 0
EYES NEGATIVE: 1
SHORTNESS OF BREATH: 0
CHEST TIGHTNESS: 0
COUGH: 0
ANAL BLEEDING: 0

## 2021-11-24 ASSESSMENT — SOCIAL DETERMINANTS OF HEALTH (SDOH): HOW HARD IS IT FOR YOU TO PAY FOR THE VERY BASICS LIKE FOOD, HOUSING, MEDICAL CARE, AND HEATING?: NOT HARD AT ALL

## 2021-11-24 NOTE — PATIENT INSTRUCTIONS
Medications e-scribe to pharmacy of pt's choice. A printed script for Durable Medical Equipment was ordered for the patient. The script was faxed to [facility] MASHA Jones, [address] [place] [address]  P: [phone] F: [phone] . Medications e-scribe to pharmacy of pt's choice. Laboratory Instructions: Your doctor has ordered blood or urine testing. You can get this testing done at the Lab located on the first floor of the [facility], or at any other [facility]. Please stop at Main Registration, before going to the lab, as you must be registered first.   Please get this lab done before your next visit. You may eat or drink before this test.  Labs given to patient    Return To Clinic 12/29/2021. After Visit Summary  given and reviewed. --    It is very important for your care that you keep your appointment. If for some reason you are unable to keep your appointment it is equally important that you call our office at [phone] to cancel your appointment and reschedule. Failure to do so may result in your termination from our practice.

## 2021-11-24 NOTE — PROGRESS NOTES
[facility] [address] [address]  Dept: [phone]  Dept Fax: [phone]    Office Progress/Follow Up Note  Date ofpatient's visit: 11/24/2021  Patient's Name:  Araseli Baird YOB: 1955            Patient Care Team:  Migue Medrano MD as PCP - General (Internal Medicine)  Anderson Owusu MD as Consulting Physician (Pulmonology)  Bucky Valencia RN as Nurse Kasey Marcum MD as Consulting Physician (Gastroenterology)  ================================================================    REASON FOR VISIT/CHIEF COMPLAINT:  Establish Care, Health Maintenance (agrees to flu vaccine, would like to hold off an hep A and B until next Office Visit, shingles vaccine pended, lab pended, declines AWV), and Mass (on left side of chest)    HISTORY OF PRESENTING ILLNESS:  History was obtained from: patient, electronic medical record. Lacie Reed a 73 y.o. is here for a follow-up appointment. Patient was last seen by Dr. Alyce Abarca    Patient today complaining of mild low back pain on the right side aggravated with bending movements, indicates mostly he sits at home currently not working. Denies history of any trauma    Was previously seen for the similar complaint and he was on Tylenol and ibuprofen for this pain. No imaging studies were ordered. He also complains of mass on the left side of the chest behind the nipple. Which on examination patient has mild swelling of soft tissue. No palpable nodules. Patient denies any loss of weight. He does have history of hypertension uncontrolled previous blood pressure reading in office visit 144/85 patient is on Lopressor 25 mg twice daily, Aldactone 100 and furosemide 20 mg given history of diastolic heart failure with EF of 55. He forgot to take his medications this morning current office blood pressure reading was 174/71--> 149/72 with a heart rate of 80.     He still smoking and drinks alcohol 4 to 5/week and he has been indicating he is not taking any recreational drugs. Last urine drug screen was positive for cocaine in 2020. He was previously given work-up for secondary hypertension with renal stent which he has not finished yet. He is diagnosed with liver cirrhosis on ultrasound and patient was previously following with Dr. Pura Morocho and also has history of hep C. Last ultrasound showed hepatomegaly, cholelithiasis along with fatty liver with cirrhosis. Patient was advised to follow-up with GI in their office. Denies any other complaints in this visit and is asking for refill of medication    Health maintenance patient did receive Shingrix referral and flu vaccine. He was previously on Metformin for prediabetes currently off of it last A1c was 5.8.         Patient Active Problem List   Diagnosis    Essential hypertension    Cigarette smoker    Alcohol abuse    Intractable migraine without status migrainosus    Emphysema lung seen on Low Dose CT Chest 9/2019 [facility])    Chronic hepatitis C without hepatic coma (HCC)    Restrictive lung disease    Adenomyomatosis of gallbladder    Musculoskeletal back pain    Diastolic dysfunction    Alcoholic cirrhosis of liver with ascites ([facility] [address]    Prediabetes    Hypokalemia       Health Maintenance Due   Topic Date Due    Shingles Vaccine (1 of 2) Never done   [employer] Visit (AWV)  Never done    Hepatitis A vaccine (2 of 2 - Risk 2-dose series) 04/08/2021    Hepatitis B vaccine (3 of 3 - Risk 3-dose series) 04/18/2021    A1C test (Diabetic or Prediabetic)  10/08/2021    COVID-19 Vaccine (3 - Booster for Pfizer series) 11/28/2021       No Known Allergies      Current Outpatient Medications   Medication Sig Dispense Refill    albuterol sulfate  (90 Base) MCG/ACT inhaler INHALE 2 PUFF BY MOUTH EVERY 6 HOURS INTO LUNGS AS NEEDED FOR WHEEZING 18 g 3    spironolactone (ALDACTONE) 100 MG tablet TAKE 1 TABLET BY MOUTH ONE TIME A DAY 30 tablet 3    famotidine (PEPCID) 20 MG tablet TAKE 1 TABLET BY MOUTH 2 TIMES A DAY 60 tablet 11    metoprolol tartrate (LOPRESSOR) 25 MG tablet TAKE ONE-HALF TABLET BY MOUTH TWO TIMES A DAY 60 tablet 3    furosemide (LASIX) 20 MG tablet Take 1 tablet by mouth daily 60 tablet 3    tiotropium (SPIRIVA RESPIMAT) 2.5 MCG/ACT AERS inhaler Inhale 2 puffs into the lungs daily (Patient not taking: Reported on 11/24/2021) 2 Inhaler 3    ibuprofen (ADVIL;MOTRIN) 800 MG tablet TAKE 1 TABLET BY MOUTH EVERY 6 HOURS AS NEEDED FOR PAIN (Patient not taking: Reported on 11/24/2021) 120 tablet 0    butalbital-acetaminophen-caffeine (FIORICET, ESGIC) -40 MG per tablet TAKE ONE TABLET BY MOUTH EVERY 12 HOURS AS NEEDED FOR HEADACHE . DO NOT DRIVE IF TAKING THIS MEDICATION. WILL CAUSE DROWSINESS. (Patient not taking: Reported on 11/24/2021) 30 tablet 4    aspirin (ASPIRIN ADULT LOW STRENGTH) 81 MG EC tablet TAKE 1 TABLET BY MOUTH ONE TIME A DAY (Patient not taking: Reported on 11/24/2021) 30 tablet 5    fluticasone (FLONASE) 50 MCG/ACT nasal spray 1 spray by Each Nare route daily 1 Spray in each nostril (Patient not taking: Reported on 5/6/2021) 2 Bottle 1    sodium chloride (ALTAMIST SPRAY) 0.65 % nasal spray 1 spray by Nasal route as needed for Congestion (Patient not taking: Reported on 5/6/2021) 1 Bottle 3    guaiFENesin 400 MG tablet Take 1 tablet by mouth 4 times daily as needed for Cough (Patient not taking: Reported on 11/24/2021) 56 tablet 0     No current facility-administered medications for this visit.        Social History     Tobacco Use    Smoking status: Current Every Day Smoker     Packs/day: 0.75     Years: 41.00     Pack years: 30.75     Types: Cigarettes     Start date: 2/26/1977    Smokeless tobacco: Former User    Tobacco comment: 4 per day currently   Vaping Use    Vaping Use: Never used   Substance Use Topics    Alcohol use: Not Currently     Alcohol/week: 14.0 standard drinks Types: 14 Cans of beer per week     Comment: somedays    Drug use: No       Family History   Problem Relation Age of Onset    Heart Disease Mother     High Blood Pressure Mother     Stroke Father     High Blood Pressure Father     Cancer Sister     Heart Disease Sister         REVIEW OF SYSTEMS:  Review of Systems   Constitutional: Negative for activity change, appetite change, chills, fatigue, fever and unexpected weight change. HENT: Negative. Eyes: Negative. Respiratory: Negative for apnea, cough, choking, chest tightness and shortness of breath. Cardiovascular: Negative. Breast mass behind the areola   Gastrointestinal: Negative. Negative for abdominal distention, abdominal pain, anal bleeding, blood in stool, constipation, diarrhea, rectal pain and vomiting. Endocrine: Negative. Genitourinary: Negative. Musculoskeletal: Positive for myalgias. Pain on the lower right side of the chest   Skin: Negative. Allergic/Immunologic: Negative. Neurological: Negative. Hematological: Negative. Psychiatric/Behavioral: Negative. PHYSICAL EXAM:  Vitals:    11/24/21 1449 11/24/21 1500   BP: (!) 149/72 (!) 174/71   Site: Right Upper Arm Right Upper Arm   Position: Sitting Sitting   Cuff Size: Medium Adult Medium Adult   Pulse: 79 80   Temp: 97.7 °F (36.5 °C)    SpO2: 98%    Weight: 179 lb (81.2 kg)    Height: 5' 8\" (1.727 m)      BP Readings from Last 3 Encounters:   11/24/21 (!) 174/71   05/06/21 (!) 149/86   04/01/21 (!) 146/86        Physical Exam  HENT:      Head: Normocephalic. Nose: Nose normal.   Cardiovascular:      Rate and Rhythm: Normal rate and regular rhythm. Pulses: Normal pulses. Comments: Nontender palpable soft tissue swelling behind the areola no signs of any nodules  Pulmonary:      Effort: Pulmonary effort is normal.      Breath sounds: Normal breath sounds. Abdominal:      General: Abdomen is flat.    Musculoskeletal: Cervical back: Normal range of motion and neck supple. Comments: No paraspinal tenderness, myalgia noticed, most likely musculoskeletal pain on the lower  right abdomen. Neurological:      General: No focal deficit present. Mental Status: He is alert and oriented to person, place, and time. Psychiatric:         Mood and Affect: Mood normal.         Behavior: Behavior normal.              DIAGNOSTIC FINDINGS:  CBC:  Lab Results   Component Value Date    WBC 11.1 10/29/2020    HGB 11.7 10/29/2020     10/29/2020       BMP:    Lab Results   Component Value Date     04/07/2021    K 3.5 04/07/2021     04/07/2021    CO2 18 04/07/2021    BUN 11 04/07/2021    CREATININE 0.87 04/07/2021    GLUCOSE 111 04/07/2021       HEMOGLOBIN A1C:   Lab Results   Component Value Date    LABA1C 5.8 10/08/2020       FASTING LIPID PANEL:  Lab Results   Component Value Date    CHOL 146 04/07/2021    HDL 64 04/07/2021    TRIG 59 04/07/2021       ASSESSMENT AND PLAN:  Macy Alves was seen today for [facility] care, health maintenance and [facility]. Diagnoses and all orders for this visit:    Need for prophylactic vaccination and inoculation against varicella  -     zoster recombinant adjuvanted vaccine (SHINGRIX) 50 MCG/0.5ML SUSR injection; 50 MCG IM then repeat 2-6 months. Pulmonary emphysema, unspecified emphysema type (HCC)  -     albuterol sulfate  (90 Base) MCG/ACT inhaler; INHALE 2 PUFF BY MOUTH EVERY 6 HOURS INTO LUNGS AS NEEDED FOR WHEEZING  -     CBC With Auto Differential; Future  -     tiotropium (SPIRIVA RESPIMAT) 2.5 MCG/ACT AERS inhaler; Inhale 2 puffs into the lungs daily    Flu vaccine need  -     PA IMMUNIZ ADMIN,1 SINGLE/COMB VAC/TOXOID    Musculoskeletal back pain  -     acetaminophen (TYLENOL) 500 MG tablet;  Take 1 tablet by mouth 2 times daily as needed for Pain    Chronic hepatitis C without hepatic coma (HCC)  -     CBC With Auto Differential; Future    Essential hypertension  -     Blood Pressure KIT; 1 Units by Does not apply route daily    Cigarette smoker    Alcohol abuse    Migraine without aura and with status migrainosus, not intractable  -     butalbital-acetaminophen-caffeine (FIORICET, ESGIC) -40 MG per tablet; TAKE ONE TABLET BY MOUTH EVERY 12 HOURS AS NEEDED FOR HEADACHE . DO NOT DRIVE IF TAKING THIS MEDICATION. WILL CAUSE DROWSINESS. Other orders  -     INFLUENZA, QUADV, 3 YRS AND OLDER, IM PF, PREFILL SYR OR SDV, 0.5ML (AFLURIA QUADV, PF)  -     losartan (COZAAR) 50 MG tablet; Take 1 tablet by mouth daily      For left breast mass change the aldactone to losartan given concern for left breast soft tissue swelling. We will give short course of Tylenol for musculoskeletal pain, will discontinue ibuprofen given hypertension. Patient was strictly advised to follow-up with GI    For chronic hep C with cirrhosis and adenomyomatosis of gallbladder    Findings chronic smoking with pulmonary emphysema we will continue on Spiriva and albuterol. Patient was advised to smoke smoking alcohol    We will give flu vaccine and shingrix referral.   FOLLOW UP AND INSTRUCTIONS:  Return in about 4 weeks (around 12/22/2021). · Marian Floyd received counseling on the following healthy behaviors: medication adherence, tobacco cessation and decrease in alcohol consumption    · Discussed use, benefit, and side effects of prescribed medications. Barriers to medication compliance addressed. All patient questions answered. Pt voiced understanding. · Patient given educational materials - see patient instructions    Bernardo Lucio MD  Internal Medicine Resident, [facility];  [place], [place]  11/24/2021, 3:30 PM      This note is created with the assistance of a speech-recognition program. While intending to generate a document that actually reflects the content of thevisit, the document can still have some mistakes which may not have been identified and corrected by editing.

## 2021-12-14 ENCOUNTER — HOSPITAL ENCOUNTER (OUTPATIENT)
Age: 66
Setting detail: SPECIMEN
Discharge: HOME | End: 2021-12-14

## 2021-12-14 DIAGNOSIS — B18.2 CHRONIC HEPATITIS C WITHOUT HEPATIC COMA (HCC): ICD-10-CM

## 2021-12-14 DIAGNOSIS — J43.9 PULMONARY EMPHYSEMA, UNSPECIFIED EMPHYSEMA TYPE (HCC): ICD-10-CM

## 2021-12-14 LAB
ABSOLUTE EOS #: 0.16 K/UL (ref 0–0.44)
ABSOLUTE IMMATURE GRANULOCYTE: 0.04 K/UL (ref 0–0.3)
ABSOLUTE LYMPH #: 3.21 K/UL (ref 1.1–3.7)
ABSOLUTE MONO #: 1.35 K/UL (ref 0.1–1.2)
BASOPHILS # BLD: 1 % (ref 0–2)
BASOPHILS ABSOLUTE: 0.08 K/UL (ref 0–0.2)
DIFFERENTIAL TYPE: ABNORMAL
EOSINOPHILS RELATIVE PERCENT: 2 % (ref 1–4)
HCT VFR BLD CALC: 41.6 % (ref 40.7–50.3)
HEMOGLOBIN: 13.3 G/DL (ref 13–17)
IMMATURE GRANULOCYTES: 0 %
LYMPHOCYTES # BLD: 31 % (ref 24–43)
MCH RBC QN AUTO: 30.8 PG (ref 25.2–33.5)
MCHC RBC AUTO-ENTMCNC: 32 G/DL (ref 28.4–34.8)
MCV RBC AUTO: 96.3 FL (ref 82.6–102.9)
MONOCYTES # BLD: 13 % (ref 3–12)
NRBC AUTOMATED: 0 PER 100 WBC
PDW BLD-RTO: 12.6 % (ref 11.8–14.4)
PLATELET # BLD: 211 K/UL (ref 138–453)
PLATELET ESTIMATE: ABNORMAL
PMV BLD AUTO: 10.8 FL (ref 8.1–13.5)
RBC # BLD: 4.32 M/UL (ref 4.21–5.77)
RBC # BLD: ABNORMAL 10*6/UL
SEG NEUTROPHILS: 53 % (ref 36–65)
SEGMENTED NEUTROPHILS ABSOLUTE COUNT: 5.58 K/UL (ref 1.5–8.1)
WBC # BLD: 10.4 K/UL (ref 3.5–11.3)
WBC # BLD: ABNORMAL 10*3/UL

## 2022-01-12 DIAGNOSIS — M54.9 MUSCULOSKELETAL BACK PAIN: ICD-10-CM

## 2022-01-12 NOTE — TELEPHONE ENCOUNTER
Request for Tylenol. Next Visit Date:  No future appointments. Health Maintenance   Topic Date Due    Shingles Vaccine (1 of 2) Never done    Annual Wellness Visit (AWV)  Never done    Hepatitis A vaccine (2 of 2 - Risk 2-dose series) 04/08/2021    Hepatitis B vaccine (3 of 3 - Risk 3-dose series) 04/18/2021    A1C test (Diabetic or Prediabetic)  10/08/2021    COVID-19 Vaccine (3 - Booster for Pfizer series) 11/28/2021    Potassium monitoring  04/07/2022    Creatinine monitoring  04/07/2022    Depression Screen  05/06/2022    Low dose CT lung screening  11/01/2022    Colon cancer screen fecal DNA test (Cologuard)  01/28/2023    DTaP/Tdap/Td vaccine (2 - Td or Tdap) 02/26/2026    Lipid screen  04/07/2026    Flu vaccine  Completed    Pneumococcal 65+ years Vaccine  Completed    AAA screen  Completed    HIV screen  Completed    Hib vaccine  Aged Out    Meningococcal (ACWY) vaccine  Aged Out       Hemoglobin A1C (%)   Date Value   10/08/2020 5.8   06/12/2019 5.4   05/25/2018 6.0             ( goal A1C is < 7)   Microalb/Crt.  Ratio (mcg/mg creat)   Date Value   08/29/2018 16     LDL Cholesterol (mg/dL)   Date Value   04/07/2021 70       (goal LDL is <100)   AST (U/L)   Date Value   04/07/2021 26     ALT (U/L)   Date Value   04/07/2021 29     BUN (mg/dL)   Date Value   04/07/2021 11     BP Readings from Last 3 Encounters:   11/24/21 (!) 149/74   05/06/21 (!) 149/86   04/01/21 (!) 146/86          (goal 120/80)    All Future Testing planned in [facility]  Lab Frequency Next Occurrence   Renin Once 03/03/2022   Aldosterone Once 03/03/2022         Patient Active Problem List:     Essential hypertension     Cigarette smoker     Alcohol abuse     Intractable migraine without status migrainosus     Emphysema lung seen on Low Dose CT Chest 9/2019 [facility])     Chronic hepatitis C without hepatic coma (HCC)     Restrictive lung disease     Adenomyomatosis of gallbladder     Musculoskeletal back pain Diastolic dysfunction     Alcoholic cirrhosis of liver with ascites (HCC)     Prediabetes     Hypokalemia

## 2022-01-13 RX ORDER — PSEUDOEPHED/ACETAMINOPH/DIPHEN 30MG-500MG
TABLET ORAL
Qty: 60 TABLET | Refills: 0 | Status: SHIPPED | OUTPATIENT
Start: 2022-01-13 | End: 2022-03-25

## 2022-01-13 NOTE — TELEPHONE ENCOUNTER
medication refilled. Letha Lainez MD  Internal Medicine Resident, [facility];  [place], [place]  1/13/2022, 9:51 AM

## 2022-01-17 ENCOUNTER — TELEPHONE (OUTPATIENT)
Dept: INTERNAL MEDICINE | Age: 67
End: 2022-01-17

## 2022-01-17 NOTE — TELEPHONE ENCOUNTER
Prior Authorization request received for Spiriva Respimat 2. 5MCG/ACT aerosol      Prior Authorization processed and submitted to patient's insurance, waiting for response in regards to medication coverage

## 2022-02-28 NOTE — TELEPHONE ENCOUNTER
Request for Spironolactone. It looks like the medication was discontinued on 11/24/21 by Dr. Anusha Rogers stating therapy completed. Next Visit Date: Last OV was on 11/24/21. LM for the patient to call the office back to schedule an appointment. No future appointments. Health Maintenance   Topic Date Due    Shingles Vaccine (1 of 2) Never done    Annual Wellness Visit (AWV)  Never done    Hepatitis A vaccine (2 of 2 - Risk 2-dose series) 04/08/2021    Hepatitis B vaccine (3 of 3 - Risk 3-dose series) 04/18/2021    A1C test (Diabetic or Prediabetic)  10/08/2021    COVID-19 Vaccine (3 - Booster for Pfizer series) 10/28/2021    Potassium monitoring  04/07/2022    Creatinine monitoring  04/07/2022    Depression Screen  05/06/2022    Low dose CT lung screening  11/01/2022    Colorectal Cancer Screen  01/28/2023    DTaP/Tdap/Td vaccine (2 - Td or Tdap) 02/26/2026    Lipid screen  04/07/2026    Flu vaccine  Completed    Pneumococcal 65+ years Vaccine  Completed    AAA screen  Completed    Hib vaccine  Aged Out    Meningococcal (ACWY) vaccine  Aged Out       Hemoglobin A1C (%)   Date Value   10/08/2020 5.8   06/12/2019 5.4   05/25/2018 6.0             ( goal A1C is < 7)   Microalb/Crt.  Ratio (mcg/mg creat)   Date Value   08/29/2018 16     LDL Cholesterol (mg/dL)   Date Value   04/07/2021 70       (goal LDL is <100)   AST (U/L)   Date Value   04/07/2021 26     ALT (U/L)   Date Value   04/07/2021 29     BUN (mg/dL)   Date Value   04/07/2021 11     BP Readings from Last 3 Encounters:   11/24/21 (!) 149/74   05/06/21 (!) 149/86   04/01/21 (!) 146/86          (goal 120/80)    All Future Testing planned in [facility]  Lab Frequency Next Occurrence   Renin Once 03/03/2022   Aldosterone Once 03/03/2022         Patient Active Problem List:     Essential hypertension     Cigarette smoker     Alcohol abuse     Intractable migraine without status migrainosus     Emphysema lung seen on Low Dose CT Chest 9/2019 ([address]     Chronic hepatitis C without hepatic coma (HCC)     Restrictive lung disease     Adenomyomatosis of gallbladder     Musculoskeletal back pain     Diastolic dysfunction     Alcoholic cirrhosis of liver with ascites (HCC)     Prediabetes     Hypokalemia

## 2022-03-01 RX ORDER — SPIRONOLACTONE 100 MG/1
TABLET, FILM COATED ORAL
Qty: 30 TABLET | Refills: 3 | OUTPATIENT
Start: 2022-03-01

## 2022-03-01 NOTE — TELEPHONE ENCOUNTER
Medication has side effect with gynecomastia during last office visit. Medication discontinued. Pierre Zuniga MD  Internal Medicine Resident, PGY- [address], [place]  3/1/2022, 12:14 PM

## 2022-03-24 DIAGNOSIS — M54.9 MUSCULOSKELETAL BACK PAIN: ICD-10-CM

## 2022-03-25 RX ORDER — PSEUDOEPHED/ACETAMINOPH/DIPHEN 30MG-500MG
TABLET ORAL
Qty: 60 TABLET | Refills: 0 | Status: SHIPPED | OUTPATIENT
Start: 2022-03-25 | End: 2022-06-14

## 2022-03-25 NOTE — TELEPHONE ENCOUNTER
Refill request for ACETAMINOPHEN EXTRA STRENGTH 500 MG tablet. If appropriate please send medication(s) to patients pharmacy. Next appt: 4/6/2022      Health Maintenance   Topic Date Due    Shingles Vaccine (1 of 2) Never done   [employer] Visit (AWV)  Never done    Hepatitis A vaccine (2 of 2 - Risk 2-dose series) 04/08/2021    Hepatitis B vaccine (3 of 3 - Risk 3-dose series) 04/18/2021    A1C test (Diabetic or Prediabetic)  10/08/2021    COVID-19 Vaccine (3 - Booster for Pfizer series) 10/28/2021    Potassium monitoring  04/07/2022    Creatinine monitoring  04/07/2022    Depression Screen  05/06/2022    Low dose CT lung screening  11/01/2022    Colorectal Cancer Screen  01/28/2023    DTaP/Tdap/Td vaccine (2 - Td or Tdap) 02/26/2026    Lipid screen  04/07/2026    Flu vaccine  Completed    Pneumococcal 65+ years Vaccine  Completed    AAA screen  Completed    Hib vaccine  Aged Out    Meningococcal (ACWY) vaccine  Aged Out       Hemoglobin A1C (%)   Date Value   10/08/2020 5.8   06/12/2019 5.4   05/25/2018 6.0             ( goal A1C is < 7)   Microalb/Crt.  Ratio (mcg/mg creat)   Date Value   08/29/2018 16     LDL Cholesterol (mg/dL)   Date Value   04/07/2021 70       (goal LDL is <100)   AST (U/L)   Date Value   04/07/2021 26     ALT (U/L)   Date Value   04/07/2021 29     BUN (mg/dL)   Date Value   04/07/2021 11     BP Readings from Last 3 Encounters:   11/24/21 (!) 149/74   05/06/21 (!) 149/86   04/01/21 (!) 146/86          (goal 120/80)          Patient Active Problem List:     Essential hypertension     Cigarette smoker     Alcohol abuse     Intractable migraine without status migrainosus     Emphysema lung seen on Low Dose CT Chest 9/2019 [facility])     Chronic hepatitis C without hepatic coma (HCC)     Restrictive lung disease     Adenomyomatosis of gallbladder     Musculoskeletal back pain     Diastolic dysfunction     Alcoholic cirrhosis of liver with ascites (HCC)     Prediabetes Hypokalemia

## 2022-04-06 ENCOUNTER — HOSPITAL ENCOUNTER (OUTPATIENT)
Age: 67
Setting detail: SPECIMEN
Discharge: HOME | End: 2022-04-06

## 2022-04-06 ENCOUNTER — OFFICE VISIT (OUTPATIENT)
Dept: INTERNAL MEDICINE | Age: 67
End: 2022-04-06
Payer: COMMERCIAL

## 2022-04-06 VITALS
TEMPERATURE: 97.2 F | BODY MASS INDEX: 28.64 KG/M2 | DIASTOLIC BLOOD PRESSURE: 82 MMHG | SYSTOLIC BLOOD PRESSURE: 154 MMHG | HEIGHT: 68 IN | HEART RATE: 66 BPM | WEIGHT: 189 LBS

## 2022-04-06 DIAGNOSIS — K70.31 ALCOHOLIC CIRRHOSIS OF LIVER WITH ASCITES (HCC): ICD-10-CM

## 2022-04-06 DIAGNOSIS — J43.9 PULMONARY EMPHYSEMA, UNSPECIFIED EMPHYSEMA TYPE (HCC): ICD-10-CM

## 2022-04-06 DIAGNOSIS — R73.03 PREDIABETES: ICD-10-CM

## 2022-04-06 DIAGNOSIS — M54.9 MUSCULOSKELETAL BACK PAIN: ICD-10-CM

## 2022-04-06 DIAGNOSIS — K21.9 GASTROESOPHAGEAL REFLUX DISEASE WITHOUT ESOPHAGITIS: ICD-10-CM

## 2022-04-06 DIAGNOSIS — K76.0 FATTY LIVER: ICD-10-CM

## 2022-04-06 DIAGNOSIS — I10 ESSENTIAL HYPERTENSION: Primary | ICD-10-CM

## 2022-04-06 DIAGNOSIS — F17.210 CIGARETTE SMOKER: ICD-10-CM

## 2022-04-06 DIAGNOSIS — K80.20 CALCULUS OF GALLBLADDER WITHOUT CHOLECYSTITIS WITHOUT OBSTRUCTION: ICD-10-CM

## 2022-04-06 DIAGNOSIS — I10 ESSENTIAL HYPERTENSION: ICD-10-CM

## 2022-04-06 LAB
AFP: 9.7 UG/L
ANION GAP SERPL CALCULATED.3IONS-SCNC: 13 MMOL/L (ref 9–17)
BUN BLDV-MCNC: 10 MG/DL (ref 8–23)
CALCIUM SERPL-MCNC: 9.7 MG/DL (ref 8.6–10.4)
CHLORIDE BLD-SCNC: 109 MMOL/L (ref 98–107)
CO2: 23 MMOL/L (ref 20–31)
CREAT SERPL-MCNC: 0.83 MG/DL (ref 0.7–1.2)
GFR AFRICAN AMERICAN: >60 ML/MIN
GFR NON-AFRICAN AMERICAN: >60 ML/MIN
GFR SERPL CREATININE-BSD FRML MDRD: ABNORMAL ML/MIN/{1.73_M2}
GLUCOSE BLD-MCNC: 87 MG/DL (ref 70–99)
HBA1C MFR BLD: 6.1 %
POTASSIUM SERPL-SCNC: 4.6 MMOL/L (ref 3.7–5.3)
SODIUM BLD-SCNC: 145 MMOL/L (ref 135–144)

## 2022-04-06 PROCEDURE — 83036 HEMOGLOBIN GLYCOSYLATED A1C: CPT | Performed by: STUDENT IN AN ORGANIZED HEALTH CARE EDUCATION/TRAINING PROGRAM

## 2022-04-06 PROCEDURE — 99213 OFFICE O/P EST LOW 20 MIN: CPT | Performed by: STUDENT IN AN ORGANIZED HEALTH CARE EDUCATION/TRAINING PROGRAM

## 2022-04-06 RX ORDER — FUROSEMIDE 20 MG/1
20 TABLET ORAL DAILY
Qty: 60 TABLET | Refills: 3 | Status: CANCELLED | OUTPATIENT
Start: 2022-04-06

## 2022-04-06 RX ORDER — FAMOTIDINE 20 MG/1
TABLET, FILM COATED ORAL
Qty: 60 TABLET | Refills: 11 | Status: SHIPPED | OUTPATIENT
Start: 2022-04-06

## 2022-04-06 RX ORDER — IBUPROFEN 600 MG/1
600 TABLET ORAL 3 TIMES DAILY PRN
Qty: 45 TABLET | Refills: 0 | Status: SHIPPED | OUTPATIENT
Start: 2022-04-06 | End: 2022-07-06

## 2022-04-06 RX ORDER — SPIRONOLACTONE 100 MG/1
TABLET, FILM COATED ORAL
COMMUNITY
Start: 2022-01-31 | End: 2022-04-06 | Stop reason: SINTOL

## 2022-04-06 RX ORDER — TIZANIDINE 2 MG/1
2 TABLET ORAL NIGHTLY PRN
Qty: 30 TABLET | Refills: 0 | Status: SHIPPED | OUTPATIENT
Start: 2022-04-06 | End: 2022-06-21

## 2022-04-06 RX ORDER — ALBUTEROL SULFATE 90 UG/1
AEROSOL, METERED RESPIRATORY (INHALATION)
Qty: 18 G | Refills: 3 | Status: SHIPPED | OUTPATIENT
Start: 2022-04-06

## 2022-04-06 RX ORDER — LOSARTAN POTASSIUM 50 MG/1
50 TABLET ORAL DAILY
Qty: 90 TABLET | Refills: 1 | Status: SHIPPED | OUTPATIENT
Start: 2022-04-06

## 2022-04-06 RX ORDER — CARVEDILOL 6.25 MG/1
6.25 TABLET ORAL 2 TIMES DAILY
Qty: 60 TABLET | Refills: 3 | Status: SHIPPED | OUTPATIENT
Start: 2022-04-06 | End: 2022-07-06 | Stop reason: ALTCHOICE

## 2022-04-06 RX ORDER — LISINOPRIL 10 MG/1
10 TABLET ORAL DAILY
Qty: 30 TABLET | Refills: 1 | Status: CANCELLED | OUTPATIENT
Start: 2022-04-06 | End: 2022-06-05

## 2022-04-06 ASSESSMENT — PATIENT HEALTH QUESTIONNAIRE - PHQ9
2. FEELING DOWN, DEPRESSED OR HOPELESS: 0
SUM OF ALL RESPONSES TO PHQ QUESTIONS 1-9: 0
1. LITTLE INTEREST OR PLEASURE IN DOING THINGS: 0
SUM OF ALL RESPONSES TO PHQ9 QUESTIONS 1 & 2: 0
SUM OF ALL RESPONSES TO PHQ QUESTIONS 1-9: 0

## 2022-04-06 ASSESSMENT — ENCOUNTER SYMPTOMS
SHORTNESS OF BREATH: 1
CONSTIPATION: 0
ABDOMINAL DISTENTION: 0
ANAL BLEEDING: 0
BLOOD IN STOOL: 0
BACK PAIN: 1
GASTROINTESTINAL NEGATIVE: 1
ABDOMINAL PAIN: 0
EYES NEGATIVE: 1

## 2022-04-06 NOTE — PATIENT INSTRUCTIONS
Medications e-scribe to pharmacy of pt's choice. Laboratory Instructions: Your doctor has ordered blood or urine testing. You can get this testing done at the Lab located on the first floor of the [facility], or at any other [facility]. Please stop at Main Registration, before going to the lab, as you must be registered first.   Please get this lab done before your next visit. You may eat or drink before this test.  Labs given to patient    Order for [address] faxed to [address] they will all pt for appt. Please call [phone] in not heard within 2 weeks. Referral to Gastroenterology was placed, summary of care printed and faxed to office, phone numbers given to the patient, they will contact office for an appt    Patient was put on a wait list for 3 months and will be contacted to schedule their next follow up appointment once the schedule is available. If the patient is in need of an appointment before their next visit please call the office at [phone]. After Visit Summary  given and reviewed.     DR Swift People with pharmacy advised them of medication change

## 2022-04-06 NOTE — PROGRESS NOTES
[facility] [address] [address]  Dept: [phone]  Dept Fax: [phone]    Office Progress/Follow Up Note  Date ofpatient's visit: 4/6/2022  Patient's Name:  Gene Kelly YOB: 1955            Patient Care Team:  Paco Knapp MD as PCP - General (Internal Medicine)  Dominic Guerrero MD as Consulting Physician (Pulmonology)  Karen Alonzo RN as Nurse George Cobian MD as Consulting Physician (Gastroenterology)  ================================================================    REASON FOR VISIT/CHIEF COMPLAINT:  Hypertension, Pain (right side pain), and Health Maintenance (declines AWV)    HISTORY OF PRESENTING ILLNESS:  History was obtained from: patient, electronic medical record. Renetta Reed a 78 y.o. is here for a follow-up of hypertension, complains of back pain    Patient today complaining of mild low back pain on the right side aggravated with bending movements, indicates mostly he sits at home currently not working. Denies history of any trauma. His back pain has been persistent despite taking Tylenol. New onset evaluation today. He does have history of hypertension uncontrolled previous blood pressure reading in office visit 154/82 patient is on Lopressor 25 mg twice daily,  furosemide 20 mg given history of diastolic heart failure with EF of 55 but patient has not been taking as it is causing him to be excessively peeing and he feels dehydrated. And he stopped taking torsemide 20 mg tablet.     He still smoking and drinks alcohol 4 to 5/week and he has been indicating he is not taking any recreational drugs.     He was previously given work-up for secondary hypertension with renal stent which he has not finished yet.     He is diagnosed with liver cirrhosis on ultrasound and patient was previously following with Dr. Maggie Owens and also has history of hep C.   Last ultrasound showed hepatomegaly, cholelithiasis along with fatty liver with cirrhosis. Patient was advised to follow-up with GI in their office. He has not followed up with his GI doctor. He was previously on Metformin for prediabetes currently off of it last A1c was 5.8-->6.1    Denies any other complaints currently.       Patient Active Problem List   Diagnosis    Essential hypertension    Cigarette smoker    Alcohol abuse    Intractable migraine without status migrainosus    Emphysema lung seen on Low Dose CT Chest 9/2019 [facility])    Chronic hepatitis C without hepatic coma (HCC)    Restrictive lung disease    Adenomyomatosis of gallbladder    Musculoskeletal back pain    Diastolic dysfunction    Alcoholic cirrhosis of liver with ascites (HCC)    Prediabetes    Hypokalemia       Health Maintenance Due   Topic Date Due    Shingles Vaccine (1 of 2) Never done   [employer] Visit (AWV)  Never done    Hepatitis A vaccine (2 of 2 - Risk 2-dose series) 04/08/2021    Hepatitis B vaccine (3 of 3 - Risk 3-dose series) 04/18/2021    Potassium monitoring  04/07/2022    Creatinine monitoring  04/07/2022    Depression Screen  05/06/2022       No Known Allergies      Current Outpatient Medications   Medication Sig Dispense Refill    ACETAMINOPHEN EXTRA STRENGTH 500 MG tablet TAKE 1 TABLET BY MOUTH TWO TIMES A DAY AS NEEDED FOR PAIN 60 tablet 0    Blood Pressure KIT 1 Units by Does not apply route daily 1 kit 0    albuterol sulfate  (90 Base) MCG/ACT inhaler INHALE 2 PUFF BY MOUTH EVERY 6 HOURS INTO LUNGS AS NEEDED FOR WHEEZING 18 g 3    tiotropium (SPIRIVA RESPIMAT) 2.5 MCG/ACT AERS inhaler Inhale 2 puffs into the lungs daily 1 each 1    losartan (COZAAR) 50 MG tablet Take 1 tablet by mouth daily 90 tablet 1    famotidine (PEPCID) 20 MG tablet TAKE 1 TABLET BY MOUTH 2 TIMES A DAY 60 tablet 11    metoprolol tartrate (LOPRESSOR) 25 MG tablet TAKE ONE-HALF TABLET BY MOUTH TWO TIMES A DAY 60 tablet 3    furosemide (LASIX) 20 MG tablet Take 1 tablet by mouth daily 60 tablet 3    spironolactone (ALDACTONE) 100 MG tablet  (Patient not taking: Reported on 4/6/2022)      zoster recombinant adjuvanted vaccine (SHINGRIX) 50 MCG/0.5ML SUSR injection 50 MCG IM then repeat 2-6 months. (Patient not taking: Reported on 4/6/2022) 0.5 mL 1    butalbital-acetaminophen-caffeine (FIORICET, ESGIC) -40 MG per tablet TAKE ONE TABLET BY MOUTH EVERY 12 HOURS AS NEEDED FOR HEADACHE . DO NOT DRIVE IF TAKING THIS MEDICATION. WILL CAUSE DROWSINESS. (Patient not taking: Reported on 4/6/2022) 30 tablet 4    ibuprofen (ADVIL;MOTRIN) 800 MG tablet TAKE 1 TABLET BY MOUTH EVERY 6 HOURS AS NEEDED FOR PAIN (Patient not taking: Reported on 4/6/2022) 120 tablet 0     No current facility-administered medications for this visit. Social History     Tobacco Use    Smoking status: Current Every Day Smoker     Packs/day: 0.75     Years: 41.00     Pack years: 30.75     Types: Cigarettes     Start date: 2/26/1977    Smokeless tobacco: Former User    Tobacco comment: 4 per day currently   Vaping Use    Vaping Use: Never used   Substance Use Topics    Alcohol use: Not Currently     Alcohol/week: 14.0 standard drinks     Types: 14 Cans of beer per week     Comment: somedays    Drug use: No       Family History   Problem Relation Age of Onset    Heart Disease Mother     High Blood Pressure Mother     Stroke Father     High Blood Pressure Father     Cancer Sister     Heart Disease Sister         REVIEW OF SYSTEMS:  Review of Systems   Constitutional: Negative for activity change, fever and unexpected weight change. HENT: Negative. Eyes: Negative. Respiratory: Positive for shortness of breath. Cardiovascular: Negative. Negative for chest pain, palpitations and leg swelling. Gastrointestinal: Negative. Negative for abdominal distention, abdominal pain, anal bleeding, blood in stool and constipation. Endocrine: Negative.   Negative for cold intolerance. Genitourinary: Negative. Musculoskeletal: Positive for back pain and myalgias. Negative for neck pain and neck stiffness. Skin: Negative. Neurological: Negative. Hematological: Negative. Psychiatric/Behavioral: Negative. PHYSICAL EXAM:  Vitals:    04/06/22 1330 04/06/22 1337   BP: (!) 155/83 (!) 154/82   Site: Left Upper Arm Left Upper Arm   Position: Sitting Sitting   Cuff Size: Medium Adult Medium Adult   Pulse: 67 66   Temp: 97.2 °F (36.2 °C)    Weight: 189 lb (85.7 kg)    Height: 5' 8\" (1.727 m)      BP Readings from Last 3 Encounters:   04/06/22 (!) 154/82   11/24/21 (!) 149/74   05/06/21 (!) 149/86        Physical Exam  Constitutional:       General: He is awake. He is not in acute distress. Appearance: Normal appearance. He is well-developed. HENT:      Head: Normocephalic and atraumatic. Nose: Nose normal.      Mouth/Throat:      Mouth: Mucous membranes are moist.   Eyes:      Conjunctiva/sclera: Conjunctivae normal.   Cardiovascular:      Rate and Rhythm: Normal rate. Pulses: Normal pulses. Heart sounds: Normal heart sounds, S1 normal and S2 normal.   Pulmonary:      Effort: Pulmonary effort is normal.      Breath sounds: Normal breath sounds and air entry. Musculoskeletal:      Cervical back: No spasms. Thoracic back: No tenderness or bony tenderness. Lumbar back: Tenderness present. Negative right straight leg raise test and negative left straight leg raise test.        Back:       Right knee: Normal.      Left knee: Normal.   Skin:     General: Skin is moist.   Neurological:      General: No focal deficit present. Mental Status: He is alert and oriented to person, place, and time. Mental status is at baseline. Psychiatric:         Attention and Perception: Attention normal.         Mood and Affect: Mood normal.         Behavior: Behavior normal. Behavior is cooperative. Thought Content:  Thought content normal. DIAGNOSTIC FINDINGS:  CBC:  Lab Results   Component Value Date    WBC 10.4 12/14/2021    HGB 13.3 12/14/2021     12/14/2021       BMP:    Lab Results   Component Value Date     04/07/2021    K 3.5 04/07/2021     04/07/2021    CO2 18 04/07/2021    BUN 11 04/07/2021    CREATININE 0.87 04/07/2021    GLUCOSE 111 04/07/2021       HEMOGLOBIN A1C:   Lab Results   Component Value Date    LABA1C 6.1 04/06/2022       FASTING LIPID PANEL:  Lab Results   Component Value Date    CHOL 146 04/07/2021    HDL 64 04/07/2021    TRIG 59 04/07/2021       ASSESSMENT AND PLAN:  Bg Mahmood was seen today for hypertension, pain and health maintenance. Diagnoses and all orders for this visit:    Essential hypertension  -     losartan (COZAAR) 50 MG tablet; Take 1 tablet by mouth daily  -     Basic Metabolic Panel; Future  -     carvedilol (COREG) 6.25 MG tablet; Take 1 tablet by mouth 2 times daily    Musculoskeletal back pain  -     tiZANidine (ZANAFLEX) 2 MG tablet; Take 1 tablet by mouth nightly as needed (muscle spasms)  -     ibuprofen (ADVIL;MOTRIN) 600 MG tablet; Take 1 tablet by mouth 3 times daily as needed for Pain    Pulmonary emphysema, unspecified emphysema type (HCC)  -     albuterol sulfate  (90 Base) MCG/ACT inhaler; INHALE 2 PUFF BY MOUTH EVERY 6 HOURS INTO LUNGS AS NEEDED FOR WHEEZING  -     tiotropium (SPIRIVA RESPIMAT) 2.5 MCG/ACT AERS inhaler; Inhale 2 puffs into the lungs daily    Alcoholic cirrhosis of liver with ascites ([address]  -     US ABDOMEN LIMITED; Future  -     AFP Tumor Marker; Future  -     Jace Ramos MD, Gastroenterology, [place]    Gastroesophageal reflux disease without esophagitis  -     famotidine (PEPCID) 20 MG tablet; TAKE 1 TABLET BY MOUTH 2 TIMES A DAY    Cigarette smoker    Calculus of gallbladder without cholecystitis without obstruction  -     US ABDOMEN LIMITED;  Future    Prediabetes  -     POCT glycosylated hemoglobin (Hb A1C)    Fatty liver  -     US ABDOMEN LIMITED; Future      - smoking cessation advised. - Ibuprofen for analgesia for MS back pain,  - Coreg and discontinue lopressor      FOLLOW UP AND INSTRUCTIONS:  Return in about 3 months (around 7/6/2022). · Sonja Hopson received counseling on the following healthy behaviors: nutrition, medication adherence and tobacco cessation    · Discussed use, benefit, and side effects of prescribed medications. Barriers to medication compliance addressed. All patient questions answered. Pt voiced understanding. · Patient given educational materials - see patient instructions    Elle Moyer MD  Internal Medicine Resident, PGY- [address]; [place], [place]  4/6/2022, 2:41 PM      This note is created with the assistance of a speech-recognition program. While intending to generate a document that actually reflects the content of thevisit, the document can still have some mistakes which may not have been identified and corrected by editing.

## 2022-04-06 NOTE — PROGRESS NOTES
Attending Physician Statement  I have discussed the care of Bakari Freedman, including pertinent history and exam findings,  with the resident. I have reviewed the key elements of all parts of the encounter with the resident. I agree with the assessment, plan and orders as documented by the resident.   (GE Modifier)

## 2022-04-14 ENCOUNTER — HOSPITAL ENCOUNTER (OUTPATIENT)
Dept: ULTRASOUND IMAGING | Age: 67
Discharge: HOME | End: 2022-04-16
Payer: COMMERCIAL

## 2022-04-14 ENCOUNTER — TELEPHONE (OUTPATIENT)
Dept: INTERNAL MEDICINE CLINIC | Age: 67
End: 2022-04-14

## 2022-04-14 DIAGNOSIS — K80.20 CALCULUS OF GALLBLADDER WITHOUT CHOLECYSTITIS WITHOUT OBSTRUCTION: ICD-10-CM

## 2022-04-14 DIAGNOSIS — K76.0 FATTY LIVER: ICD-10-CM

## 2022-04-14 DIAGNOSIS — K70.31 ALCOHOLIC CIRRHOSIS OF LIVER WITH ASCITES (HCC): ICD-10-CM

## 2022-04-14 PROCEDURE — 76705 ECHO EXAM OF ABDOMEN: CPT

## 2022-04-14 NOTE — TELEPHONE ENCOUNTER
Made a phone call to patient regarding his recent lab work-up of AFP tumor marker elevated at 9.7 on 4/6/2022. Went to [place]. I do left a message message in  Voicemail for the patient to follow-up with GI. Patient has not seen GI for his HCV F2 staging since 10/30/2019. Patient was advised to follow-up with his gastroenterologist over the last 2 office visits    Due for an ultrasound of abdomen, as per chart review he is  getting the scan today. Meghan Smart MD  Internal Medicine Resident, [facility];  [place], [place]  4/14/2022, 8:58 AM

## 2022-04-14 NOTE — TELEPHONE ENCOUNTER
----- Message from Alphonso Chacko MD sent at 4/8/2022 12:04 PM EDT -----    ----- Message -----  From: Afia Marvin Incoming Lab Results From [facility]  Sent: 4/6/2022   7:12 PM EDT  To: Alphonso Chacko MD

## 2022-04-26 NOTE — RESULT ENCOUNTER NOTE
Made a phone call to the patient in regards to updating results of his ultrasound liver showing concerning features for cirrhosis along with a small gallstone with redemonstration of gallbladder adenomyomatosis. Patient did not answer the call and I left a message in voicemail. Please recall the patient and update the following results of his ultrasound and advise him to follow-up with his GI doctor. Kobi Steinberg MD  Internal Medicine Resident, PGY- [facility];  [place], [place]  4/26/2022, 4:45 PM

## 2022-05-06 ENCOUNTER — OFFICE VISIT (OUTPATIENT)
Dept: GASTROENTEROLOGY | Age: 67
End: 2022-05-06
Payer: COMMERCIAL

## 2022-05-06 VITALS
WEIGHT: 185 LBS | SYSTOLIC BLOOD PRESSURE: 192 MMHG | BODY MASS INDEX: 28.04 KG/M2 | HEIGHT: 68 IN | HEART RATE: 77 BPM | DIASTOLIC BLOOD PRESSURE: 92 MMHG

## 2022-05-06 DIAGNOSIS — Z11.59 ENCOUNTER FOR SCREENING FOR OTHER VIRAL DISEASES: ICD-10-CM

## 2022-05-06 DIAGNOSIS — F10.20 ALCOHOLISM (HCC): ICD-10-CM

## 2022-05-06 DIAGNOSIS — B18.2 CHRONIC HEPATITIS C WITH HEPATIC COMA (HCC): Primary | ICD-10-CM

## 2022-05-06 DIAGNOSIS — R14.0 BLOATING: ICD-10-CM

## 2022-05-06 PROCEDURE — 99204 OFFICE O/P NEW MOD 45 MIN: CPT | Performed by: INTERNAL MEDICINE

## 2022-05-06 RX ORDER — SIMETHICONE 80 MG
80 TABLET,CHEWABLE ORAL 4 TIMES DAILY PRN
Qty: 180 TABLET | Refills: 3 | Status: SHIPPED | OUTPATIENT
Start: 2022-05-06

## 2022-05-06 ASSESSMENT — ENCOUNTER SYMPTOMS
NAUSEA: 0
TROUBLE SWALLOWING: 0
CONSTIPATION: 0
SHORTNESS OF BREATH: 1
ABDOMINAL PAIN: 1
APNEA: 0
VOICE CHANGE: 0
COUGH: 0
WHEEZING: 1
ABDOMINAL DISTENTION: 1
COLOR CHANGE: 0
BLOOD IN STOOL: 0
ANAL BLEEDING: 0
SORE THROAT: 0
DIARRHEA: 0
CHOKING: 0
RECTAL PAIN: 0
VOMITING: 0

## 2022-05-06 NOTE — PROGRESS NOTES
Reason for Referral: Hepatitis C      MD Dwayne [address] [facility] [place],  [address]  [address]    Chief Complaint   Patient presents with    New Patient     stomach pain x mos, center abdomal area    Hepatitis C     more than 5 years           HISTORY OF PRESENT ILLNESS: Everett Mar is a 78 y.o. male with a past history remarkable for alcoholism and alcoholic related cirrhosis, chronic hepatitis C, untreated, active smoker, referred for evaluation of cirrhosis. Patient denies any abdominal distention related to abdominal ascites. Routinely drinks on a weekly basis. No evidence of abdominal ascites. Gallbladder ultrasound revealed cholelithiasis with abdominal myomatosis. Mild elevation in liver function test previously. Patient was informed of his hepatitis C, and cirrhosis. No recent upper endoscopy. Patient is refusing endoscopic evaluation including colorectal cancer screening. Had a Cologuard performed 2 years ago which was negative. Smoker: active smoker 1/6 ppd  Drinking history: Malt-x 30 yrs  Abdominal surgeries: None recent  Prior Colonoscopy: Refused  Prior EGD: Refused  FH of GI issues: Denied      Past Medical,Family, and Social History reviewed and does contribute to the patient presentingcondition. Patient's PMH/PSH,SH,PSYCH Hx, MEDs, ALLERGIES, and ROS were all reviewed and updated in the appropriate sections.     PAST MEDICAL HISTORY:  Past Medical History:   Diagnosis Date    Alcohol abuse 11/3/2016    Chronic hepatitis C without hepatic coma ([address] 3/27/2019    COPD, severity to be determined ([facility] [address] 8/17/2018    Headache 3/20/2017    Uncontrolled stage 2 hypertension 3/11/2016       Past Surgical History:   Procedure Laterality Date    NECK SURGERY  about 30 years ago    pt states he had a goiter removed       CURRENT MEDICATIONS:    Current Outpatient Medications:     simethicone (MYLICON) 80 MG chewable tablet, Take 1 tablet by mouth 4 times daily as needed for Flatulence, Disp: 180 tablet, Rfl: 3    albuterol sulfate  (90 Base) MCG/ACT inhaler, INHALE 2 PUFF BY MOUTH EVERY 6 HOURS INTO LUNGS AS NEEDED FOR WHEEZING, Disp: 18 g, Rfl: 3    losartan (COZAAR) 50 MG tablet, Take 1 tablet by mouth daily, Disp: 90 tablet, Rfl: 1    famotidine (PEPCID) 20 MG tablet, TAKE 1 TABLET BY MOUTH 2 TIMES A DAY, Disp: 60 tablet, Rfl: 11    tiotropium (SPIRIVA RESPIMAT) 2.5 MCG/ACT AERS inhaler, Inhale 2 puffs into the lungs daily, Disp: 1 each, Rfl: 1    tiZANidine (ZANAFLEX) 2 MG tablet, Take 1 tablet by mouth nightly as needed (muscle spasms), Disp: 30 tablet, Rfl: 0    carvedilol (COREG) 6.25 MG tablet, Take 1 tablet by mouth 2 times daily, Disp: 60 tablet, Rfl: 3    ACETAMINOPHEN EXTRA STRENGTH 500 MG tablet, TAKE 1 TABLET BY MOUTH TWO TIMES A DAY AS NEEDED FOR PAIN, Disp: 60 tablet, Rfl: 0    Blood Pressure KIT, 1 Units by Does not apply route daily, Disp: 1 kit, Rfl: 0    ibuprofen (ADVIL;MOTRIN) 600 MG tablet, Take 1 tablet by mouth 3 times daily as needed for Pain, Disp: 45 tablet, Rfl: 0    ALLERGIES:   No Known Allergies    FAMILY HISTORY:       Problem Relation Age of Onset    Heart Disease Mother     High Blood Pressure Mother     Stroke Father     High Blood Pressure Father     Cancer Sister     Heart Disease Sister          SOCIAL HISTORY:   Social History     Socioeconomic History    Marital status: Single     Spouse name: Not on file    Number of children: Not on file    Years of education: Not on file    Highest education level: Not on file   Occupational History    Not on file   Tobacco Use    Smoking status: Current Every Day Smoker     Packs/day: 0.75     Years: 41.00     Pack years: 30.75     Types: Cigarettes     Start date: 2/26/1977    Smokeless tobacco: Former User    Tobacco comment: 4 per day currently   Vaping Use    Vaping Use: Never used   Substance and Sexual Activity    Alcohol use: Not Currently Alcohol/week: 14.0 standard drinks     Types: 14 Cans of beer per week     Comment: somedays    Drug use: No    Sexual activity: Not Currently   Other Topics Concern    Not on file   Social History Narrative    Not on file     Social Determinants of Health     Financial Resource Strain: Low Risk     Difficulty of Paying Living Expenses: Not hard at all   Food Insecurity: No Food Insecurity    Worried About Running Out of Food in the Last Year: Never true    Chris of Food in the Last Year: Never true   Transportation Needs:     Lack of Transportation (Medical): Not on file    Lack of Transportation (Non-Medical): Not on file   Physical Activity:     Days of Exercise per Week: Not on file    Minutes of Exercise per Session: Not on file   Stress:     Feeling of Stress : Not on file   Social Connections:     Frequency of Communication with Friends and Family: Not on file    Frequency of Social Gatherings with Friends and Family: Not on file    Attends Sikhism Services: Not on file    Active Member of [address] [place] [address] or Organizations: Not on file    Attends Club or Organization Meetings: Not on file    Marital Status: Not on file   Intimate Partner Violence:     Fear of Current or Ex-Partner: Not on file    Emotionally Abused: Not on file    Physically Abused: Not on file    Sexually Abused: Not on file   Housing Stability:     Unable to Pay for Housing in the Last Year: Not on file    Number of [place] in the Last Year: Not on file    Unstable Housing in the Last Year: Not on file         REVIEW OF SYSTEMS: A 12-point review of systems was obtained and pertinent positives and negatives were listed below. REVIEW OF SYSTEMS:     Constitutional: No fever, no chills, no lethargy, no weakness. HEENT:  No headache, otalgia, itchy eyes, nasal discharge or sore throat. Cardiac:  No chest pain, dyspnea, orthopnea or PND. Chest:   No cough, phlegm or wheezing.   Abdomen:      Detailed by MA   Neuro: No focal weakness, abnormal movements or seizure like activity. Skin:   No rashes, no itching. :   No hematuria, no pyuria, no dysuria, no flank pain. Extremities:  No swelling or joint pains. ROS was otherwise negative    Review of Systems   Constitutional: Negative for appetite change, fatigue, fever and unexpected weight change. HENT: Negative for sore throat, trouble swallowing and voice change. Eyes: Negative for visual disturbance. Respiratory: Positive for shortness of breath (COPD) and wheezing. Negative for apnea, cough and choking. Cardiovascular: Negative for chest pain, palpitations and leg swelling. Gastrointestinal: Positive for abdominal distention and abdominal pain. Negative for anal bleeding, blood in stool, constipation, diarrhea, nausea, rectal pain and vomiting. Genitourinary: Negative for difficulty urinating. Skin: Negative for color change and rash. Neurological: Positive for light-headedness. Negative for dizziness, seizures, weakness, numbness and headaches. Hematological: Does not bruise/bleed easily. Psychiatric/Behavioral: Negative for confusion and sleep disturbance. The patient is not nervous/anxious. PHYSICAL EXAMINATION: Vital signs reviewed per the nursing documentation. BP (!) 192/92   Pulse 77   Ht 5' 8\" (1.727 m)   Wt 185 lb (83.9 kg)   BMI 28.13 kg/m²   Body mass index is 28.13 kg/m². Physical Exam    Physical Exam   Constitutional: Patient is oriented to person, place, and time. Patient appears well-developed and well-nourished. HENT:   Head: Normocephalic and atraumatic. Eyes: Pupils are equal, round, and reactive to light. EOM are normal.   Neck: Normal range of motion. Neck supple. No JVD present. No tracheal deviation present. No thyromegaly present. Cardiovascular: Normal rate, regular rhythm, normal heart sounds and intact distal pulses. Pulmonary/Chest: Effort normal and breath sounds normal. No stridor.  No respiratory distress. He has no wheezes. He has no rales. He exhibits no tenderness. Abdominal: Soft. Bowel sounds are normal. He exhibits no distension and no mass. There is no tenderness. There is no rebound and no guarding. No hernia. Musculoskeletal: Normal range of motion. Lymphadenopathy:    Patient has no cervical adenopathy. Neurological: Patient is alert and oriented to person, place, and time. Psychiatric: Patient has a normal mood and affect. Patient behavior is normal.       LABORATORY DATA: Reviewed  Lab Results   Component Value Date    WBC 10.4 12/14/2021    HGB 13.3 12/14/2021    HCT 41.6 12/14/2021    MCV 96.3 12/14/2021     12/14/2021     (H) 04/06/2022    K 4.6 04/06/2022     (H) 04/06/2022    CO2 23 04/06/2022    BUN 10 04/06/2022    CREATININE 0.83 04/06/2022    LABALBU 4.1 04/07/2021    BILITOT 0.37 04/07/2021    ALKPHOS 127 04/07/2021    AST 26 04/07/2021    ALT 29 04/07/2021    INR 0.9 05/29/2019         Lab Results   Component Value Date    RBC 4.32 12/14/2021    HGB 13.3 12/14/2021    MCV 96.3 12/14/2021    MCH 30.8 12/14/2021    MCHC 32.0 12/14/2021    RDW 12.6 12/14/2021    MPV 10.8 12/14/2021    BASOPCT 1 12/14/2021    LYMPHSABS 3.21 12/14/2021    MONOSABS 1.35 (H) 12/14/2021    NEUTROABS 5.58 12/14/2021    EOSABS 0.16 12/14/2021    BASOSABS 0.08 12/14/2021         DIAGNOSTIC TESTING:     US ABDOMEN LIMITED Specify organ? LIVER, GALLBLADDER    Result Date: 4/14/2022  EXAMINATION: RIGHT UPPER QUADRANT ULTRASOUND 4/14/2022 8:31 am COMPARISON: Limited abdominal ultrasound dated 10/29/2020. HISTORY: ORDERING SYSTEM PROVIDED HISTORY: Alcoholic cirrhosis of liver with ascites [facility]) TECHNOLOGIST PROVIDED HISTORY: This procedure can be scheduled via MyChart. Access your [employer] account by visiting [employer]. Cholilithiasis, cirrhosis of liver.  Specify organ?->LIVER Specify organ?->GALLBLADDER FINDINGS: LIVER:  There is a coarsened echotexture of the liver with slightly nodular contour. Normal hepatopetal flow is demonstrated in the main portal vein. Liver measures 15.1 cm in length. No focal hepatic mass identified. BILIARY SYSTEM:  Focal gallbladder wall thickening and associated comet tail artifact are noted, similar to the previous study. There is a gallstone in the gallbladder. No gallbladder distension or pericholecystic fluid. Sonographer reports a negative Puckett's sign. Common bile duct is within normal limits measuring 7 mm. RIGHT KIDNEY: There is questionable increased cortical echogenicity in the right kidney, possibly artifactual.  Sonographer reports technically challenging visibility of the right kidney due to rib shadows. There is no hydronephrosis. Right kidney measures 9.7 cm in length. PANCREAS:  Visualized portions of the pancreas are unremarkable. OTHER: No evidence of right upper quadrant ascites. 1.  Coarsened hepatic echotexture and slightly nodular contour, possibly related to cirrhosis. No ascites. 2.  Redemonstration of gallbladder adenomyomatosis. Cholelithiasis also noted. 3.  Questionable increased cortical echogenicity of the right kidney, which could be artifactual versus related to medical renal disease. RECOMMENDATIONS: Unavailable          IMPRESSION: Vivienne Pedro is a 78 y.o. male with a past history remarkable for alcoholism and alcoholic related cirrhosis, chronic hepatitis C, untreated, active smoker, referred for evaluation of cirrhosis. Patient denies any abdominal distention related to abdominal ascites. Routinely drinks on a weekly basis. No evidence of abdominal ascites. Gallbladder ultrasound revealed cholelithiasis with abdominal myomatosis. Mild elevation in liver function test previously. Patient was informed of his hepatitis C, and cirrhosis. No recent upper endoscopy. Patient is refusing endoscopic evaluation including colorectal cancer screening.   Had a Cologuard performed 2 years ago which was negative. Assessment  1. Chronic hepatitis C with hepatic coma ([facility] [address]    2. Alcoholism ([facility] [address]    3. Encounter for screening for other viral diseases     4. Delano Lorenzo was seen today for new patient and hepatitis c.    Diagnoses and all orders for this visit:    Chronic hepatitis C with hepatic coma (HCC)we will attempt to assess the patient's viral disease and state. Patient will likely need 12 weeks of antiviral treatment. -     CBC with Auto Differential; Future  -     Comprehensive Metabolic Panel with Bilirubin; Future  -     Liver Fibrosis, Chronic Viral Hepatitis; Future  -     Hepatitis A Antibody, Total; Future  -     Hepatitis B Surface Antibody; Future  -     Hepatitis C Antibody; Future  -     Hepatitis C Genotype; Future  -     Hepatitis C RNA, quantitative, PCR; Future  -     Urine Drug Screen; Future  -     Ethanol; Future  -     HIV Screen; Future  -     Hepatitis B Core Antibody, Total; Future    Alcoholism (HCC)strongly advise smoking and alcohol cessation. Informed patient of risk of liver disease progression should he fail to maintain sobriety  -     NM GASTRIC EMPTYING; Future    Encounter for screening for other viral diseases   -     Hepatitis B Surface Antibody; Future  -     Hepatitis B Core Antibody, Total; Future  -     NM GASTRIC EMPTYING; Future    Bloatingsuspect possible gastroparesis, will send for emptying test provide simethicone as needed. Avoid gas producing foods.  -     NM GASTRIC EMPTYING; Future    Other orders  -     simethicone (MYLICON) 80 MG chewable tablet; Take 1 tablet by mouth 4 times daily as needed for Flatulence             RTC: 3 months    Additional comments: Thank you for allowing me to participate in the care of Mr. Isai Zuniga. For any further questions please do not hesitate to contact me. I have reviewed and agree with the MA/LPN ROS please refer to their documentation from today's encounter on a separate note.      Linda Mandel MD, MPH Board Certified in Gastroenterology  Board Certified in [address] [place] [address] #: [phone]          this note is created with the assistance of a speech recognition program.  While intending to generate a document that actually reflects the content of the visit, the document can still have some errors including those of syntax and sound a like substitutions which may escape proof reading. It such instances, actual meaning can be extrapolated by contextual diversion.

## 2022-05-16 DIAGNOSIS — I10 ESSENTIAL HYPERTENSION: ICD-10-CM

## 2022-05-16 RX ORDER — LOSARTAN POTASSIUM 50 MG/1
TABLET ORAL
Qty: 90 TABLET | Refills: 1 | OUTPATIENT
Start: 2022-05-16

## 2022-06-09 DIAGNOSIS — I10 ESSENTIAL HYPERTENSION: ICD-10-CM

## 2022-06-09 DIAGNOSIS — M54.9 MUSCULOSKELETAL BACK PAIN: ICD-10-CM

## 2022-06-10 RX ORDER — LOSARTAN POTASSIUM 50 MG/1
TABLET ORAL
Qty: 90 TABLET | Refills: 1 | OUTPATIENT
Start: 2022-06-10

## 2022-06-10 NOTE — TELEPHONE ENCOUNTER
Request for Acetaminophen. Next Visit Date:  Future Appointments   Date Time Provider Cy Andrewi   8/8/2022  2:30 PM [place] Delgado [place] [address] [facility]   Topic Date Due    Annual Wellness Visit (AWV)  Never done    Prostate Specific Antigen (PSA) Screening or Monitoring  Never done    Shingles vaccine (1 of 2) Never done    Hepatitis A vaccine (2 of 2 - Risk 2-dose series) 04/08/2021    Hepatitis B vaccine (3 of 3 - Risk 3-dose series) 04/18/2021    Pneumococcal 65+ years Vaccine (2 - PCV) 04/01/2022    Low dose CT lung screening  11/01/2022    Colorectal Cancer Screen  01/28/2023    A1C test (Diabetic or Prediabetic)  04/06/2023    Depression Screen  04/06/2023    DTaP/Tdap/Td vaccine (2 - Td or Tdap) 02/26/2026    Lipids  04/07/2026    Flu vaccine  Completed    COVID-19 Vaccine  Completed    AAA screen  Completed    Hib vaccine  Aged Out    Meningococcal (ACWY) vaccine  Aged Out       Hemoglobin A1C (%)   Date Value   04/06/2022 6.1   10/08/2020 5.8   06/12/2019 5.4             ( goal A1C is < 7)   Microalb/Crt.  Ratio (mcg/mg creat)   Date Value   08/29/2018 16     LDL Cholesterol (mg/dL)   Date Value   04/07/2021 70       (goal LDL is <100)   AST (U/L)   Date Value   04/07/2021 26     ALT (U/L)   Date Value   04/07/2021 29     BUN (mg/dL)   Date Value   04/06/2022 10     BP Readings from Last 3 Encounters:   05/06/22 (!) 192/92   04/06/22 (!) 154/82   11/24/21 (!) 149/74          (goal 120/80)    All Future Testing planned in [facility]  Lab Frequency Next Occurrence   CBC with Auto Differential Once 05/06/2022   Comprehensive Metabolic Panel with Bilirubin Once 05/06/2022   Liver Fibrosis, Chronic Viral Hepatitis Once 05/06/2022   Hepatitis A Antibody, Total Once 05/06/2022   Hepatitis B Surface Antibody Once 05/06/2022   Hepatitis C Antibody Once 05/06/2022   Hepatitis C Genotype Once 05/06/2022   Hepatitis C RNA, quantitative, PCR Once 05/06/2022   Urine Drug Screen Once 05/06/2022   Ethanol Once 05/06/2022   HIV Screen Once 05/06/2022   Hepatitis B Core Antibody, Total Once 05/06/2022   NM GASTRIC EMPTYING Once 05/06/2022         Patient Active Problem List:     Essential hypertension     Cigarette smoker     Alcohol abuse     Intractable migraine without status migrainosus     Emphysema lung seen on Low Dose CT Chest 9/2019 [facility])     Chronic hepatitis C without hepatic coma ([address]     Restrictive lung disease     Adenomyomatosis of gallbladder     Musculoskeletal back pain     Diastolic dysfunction     Alcoholic cirrhosis of liver with ascites ([address]     Prediabetes     Hypokalemia

## 2022-06-14 RX ORDER — PSEUDOEPHED/ACETAMINOPH/DIPHEN 30MG-500MG
TABLET ORAL
Qty: 60 TABLET | Refills: 0 | Status: SHIPPED | OUTPATIENT
Start: 2022-06-14

## 2022-06-21 DIAGNOSIS — M54.9 MUSCULOSKELETAL BACK PAIN: ICD-10-CM

## 2022-06-21 RX ORDER — TIZANIDINE 2 MG/1
TABLET ORAL
Qty: 30 TABLET | Refills: 1 | Status: SHIPPED | OUTPATIENT
Start: 2022-06-21

## 2022-06-21 NOTE — TELEPHONE ENCOUNTER
Spoke with patient in regards to back pain via phone call. Indicates he still has intermittent back pain and has been using his Zanaflex as needed which is controlling his symptoms along with ibuprofen as needed which he currently ran out of it. Recommend him to get evaluated for his persistent back pain in office next office visit as of now he wants to get his Zanaflex. Andrea Hathaway MD  Internal Medicine Resident, PGY- [facility];  [place], [place]  6/21/2022, 11:02 AM

## 2022-06-21 NOTE — TELEPHONE ENCOUNTER
Request for Zanalfex. Next Visit Date:  Future Appointments   Date Time Provider Cy Chey[place]   8/8/2022  2:30 PM Christy Bruce [place] [address] [facility]   Topic Date Due    Annual Wellness Visit (AWV)  Never done    Prostate Specific Antigen (PSA) Screening or Monitoring  Never done    Shingles vaccine (1 of 2) Never done    Hepatitis A vaccine (2 of 2 - Risk 2-dose series) 04/08/2021    Hepatitis B vaccine (3 of 3 - Risk 3-dose series) 04/18/2021    Pneumococcal 65+ years Vaccine (2 - PCV) 04/01/2022    Low dose CT lung screening  11/01/2022    Colorectal Cancer Screen  01/28/2023    A1C test (Diabetic or Prediabetic)  04/06/2023    Depression Screen  04/06/2023    DTaP/Tdap/Td vaccine (2 - Td or Tdap) 02/26/2026    Lipids  04/07/2026    Flu vaccine  Completed    COVID-19 Vaccine  Completed    AAA screen  Completed    Hib vaccine  Aged Out    Meningococcal (ACWY) vaccine  Aged Out       Hemoglobin A1C (%)   Date Value   04/06/2022 6.1   10/08/2020 5.8   06/12/2019 5.4             ( goal A1C is < 7)   Microalb/Crt.  Ratio (mcg/mg creat)   Date Value   08/29/2018 16     LDL Cholesterol (mg/dL)   Date Value   04/07/2021 70       (goal LDL is <100)   AST (U/L)   Date Value   04/07/2021 26     ALT (U/L)   Date Value   04/07/2021 29     BUN (mg/dL)   Date Value   04/06/2022 10     BP Readings from Last 3 Encounters:   05/06/22 (!) 192/92   04/06/22 (!) 154/82   11/24/21 (!) 149/74          (goal 120/80)    All Future Testing planned in [facility]  Lab Frequency Next Occurrence   CBC with Auto Differential Once 05/06/2022   Comprehensive Metabolic Panel with Bilirubin Once 05/06/2022   Liver Fibrosis, Chronic Viral Hepatitis Once 05/06/2022   Hepatitis A Antibody, Total Once 05/06/2022   Hepatitis B Surface Antibody Once 05/06/2022   Hepatitis C Antibody Once 05/06/2022   Hepatitis C Genotype Once 05/06/2022   Hepatitis C RNA, quantitative, PCR Once 05/06/2022   Urine Drug Screen Once 05/06/2022   Ethanol Once 05/06/2022   HIV Screen Once 05/06/2022   Hepatitis B Core Antibody, Total Once 05/06/2022   NM GASTRIC EMPTYING Once 05/06/2022         Patient Active Problem List:     Essential hypertension     Cigarette smoker     Alcohol abuse     Intractable migraine without status migrainosus     Emphysema lung seen on Low Dose CT Chest 9/2019 [facility])     Chronic hepatitis C without hepatic coma ([address]     Restrictive lung disease     Adenomyomatosis of gallbladder     Musculoskeletal back pain     Diastolic dysfunction     Alcoholic cirrhosis of liver with ascites ([address]     Prediabetes     Hypokalemia

## 2022-07-06 ENCOUNTER — HOSPITAL ENCOUNTER (OUTPATIENT)
Age: 67
Setting detail: SPECIMEN
Discharge: HOME | End: 2022-07-06

## 2022-07-06 ENCOUNTER — OFFICE VISIT (OUTPATIENT)
Dept: INTERNAL MEDICINE | Age: 67
End: 2022-07-06
Payer: COMMERCIAL

## 2022-07-06 VITALS
BODY MASS INDEX: 28.19 KG/M2 | HEART RATE: 51 BPM | OXYGEN SATURATION: 98 % | SYSTOLIC BLOOD PRESSURE: 172 MMHG | WEIGHT: 186 LBS | TEMPERATURE: 97.3 F | DIASTOLIC BLOOD PRESSURE: 88 MMHG | HEIGHT: 68 IN

## 2022-07-06 DIAGNOSIS — Z11.59 ENCOUNTER FOR SCREENING FOR OTHER VIRAL DISEASES: ICD-10-CM

## 2022-07-06 DIAGNOSIS — M54.9 MUSCULOSKELETAL BACK PAIN: ICD-10-CM

## 2022-07-06 DIAGNOSIS — Z23 NEED FOR VACCINATION: ICD-10-CM

## 2022-07-06 DIAGNOSIS — F17.210 CIGARETTE SMOKER: ICD-10-CM

## 2022-07-06 DIAGNOSIS — I10 ESSENTIAL HYPERTENSION: Primary | ICD-10-CM

## 2022-07-06 DIAGNOSIS — B18.2 CHRONIC HEPATITIS C WITH HEPATIC COMA (HCC): ICD-10-CM

## 2022-07-06 LAB
ABSOLUTE EOS #: 0.18 K/UL (ref 0–0.44)
ABSOLUTE IMMATURE GRANULOCYTE: <0.03 K/UL (ref 0–0.3)
ABSOLUTE LYMPH #: 3 K/UL (ref 1.1–3.7)
ABSOLUTE MONO #: 0.93 K/UL (ref 0.1–1.2)
ALBUMIN SERPL-MCNC: 4.6 G/DL (ref 3.5–5.2)
ALBUMIN/GLOBULIN RATIO: 1.2 (ref 1–2.5)
ALP BLD-CCNC: 112 U/L (ref 40–129)
ALT SERPL-CCNC: 49 U/L (ref 5–41)
AMPHETAMINE SCREEN URINE: NEGATIVE
ANION GAP SERPL CALCULATED.3IONS-SCNC: 11 MMOL/L (ref 9–17)
AST SERPL-CCNC: 43 U/L
BARBITURATE SCREEN URINE: NEGATIVE
BASOPHILS # BLD: 1 % (ref 0–2)
BASOPHILS ABSOLUTE: 0.08 K/UL (ref 0–0.2)
BENZODIAZEPINE SCREEN, URINE: NEGATIVE
BILIRUB SERPL-MCNC: 0.64 MG/DL (ref 0.3–1.2)
BILIRUBIN DIRECT: 0.14 MG/DL
BILIRUBIN, INDIRECT: 0.5 MG/DL (ref 0–1)
BUN BLDV-MCNC: 15 MG/DL (ref 8–23)
CALCIUM SERPL-MCNC: 9.9 MG/DL (ref 8.6–10.4)
CANNABINOID SCREEN URINE: NEGATIVE
CHLORIDE BLD-SCNC: 107 MMOL/L (ref 98–107)
CO2: 22 MMOL/L (ref 20–31)
COCAINE METABOLITE, URINE: POSITIVE
CREAT SERPL-MCNC: 0.99 MG/DL (ref 0.7–1.2)
EOSINOPHILS RELATIVE PERCENT: 2 % (ref 1–4)
ETHANOL PERCENT: <0.01 %
ETHANOL: <10 MG/DL
GFR AFRICAN AMERICAN: >60 ML/MIN
GFR NON-AFRICAN AMERICAN: >60 ML/MIN
GFR SERPL CREATININE-BSD FRML MDRD: ABNORMAL ML/MIN/{1.73_M2}
GLUCOSE BLD-MCNC: 86 MG/DL (ref 70–99)
HCT VFR BLD CALC: 47.2 % (ref 40.7–50.3)
HEMOGLOBIN: 14.8 G/DL (ref 13–17)
IMMATURE GRANULOCYTES: 0 %
LYMPHOCYTES # BLD: 33 % (ref 24–43)
MCH RBC QN AUTO: 31.1 PG (ref 25.2–33.5)
MCHC RBC AUTO-ENTMCNC: 31.4 G/DL (ref 28.4–34.8)
MCV RBC AUTO: 99.2 FL (ref 82.6–102.9)
METHADONE SCREEN, URINE: NEGATIVE
MONOCYTES # BLD: 10 % (ref 3–12)
NRBC AUTOMATED: 0 PER 100 WBC
OPIATES, URINE: NEGATIVE
OXYCODONE SCREEN URINE: NEGATIVE
PDW BLD-RTO: 13.3 % (ref 11.8–14.4)
PHENCYCLIDINE, URINE: NEGATIVE
PLATELET # BLD: 204 K/UL (ref 138–453)
PMV BLD AUTO: 10.6 FL (ref 8.1–13.5)
POTASSIUM SERPL-SCNC: 4.4 MMOL/L (ref 3.7–5.3)
RBC # BLD: 4.76 M/UL (ref 4.21–5.77)
SEG NEUTROPHILS: 54 % (ref 36–65)
SEGMENTED NEUTROPHILS ABSOLUTE COUNT: 5.03 K/UL (ref 1.5–8.1)
SODIUM BLD-SCNC: 140 MMOL/L (ref 135–144)
TEST INFORMATION: ABNORMAL
TOTAL PROTEIN: 8.5 G/DL (ref 6.4–8.3)
WBC # BLD: 9.2 K/UL (ref 3.5–11.3)

## 2022-07-06 PROCEDURE — 99213 OFFICE O/P EST LOW 20 MIN: CPT | Performed by: STUDENT IN AN ORGANIZED HEALTH CARE EDUCATION/TRAINING PROGRAM

## 2022-07-06 PROCEDURE — 99211 OFF/OP EST MAY X REQ PHY/QHP: CPT | Performed by: INTERNAL MEDICINE

## 2022-07-06 PROCEDURE — 1123F ACP DISCUSS/DSCN MKR DOCD: CPT | Performed by: STUDENT IN AN ORGANIZED HEALTH CARE EDUCATION/TRAINING PROGRAM

## 2022-07-06 PROCEDURE — G0009 ADMIN PNEUMOCOCCAL VACCINE: HCPCS | Performed by: INTERNAL MEDICINE

## 2022-07-06 PROCEDURE — 90677 PCV20 VACCINE IM: CPT | Performed by: INTERNAL MEDICINE

## 2022-07-06 RX ORDER — HYDROCHLOROTHIAZIDE 25 MG/1
25 TABLET ORAL DAILY
Qty: 90 TABLET | Refills: 1 | Status: SHIPPED | OUTPATIENT
Start: 2022-07-06

## 2022-07-06 RX ORDER — AMLODIPINE BESYLATE 5 MG/1
5 TABLET ORAL DAILY
Qty: 30 TABLET | Refills: 0 | Status: SHIPPED | OUTPATIENT
Start: 2022-07-06

## 2022-07-06 ASSESSMENT — ENCOUNTER SYMPTOMS
RESPIRATORY NEGATIVE: 1
ALLERGIC/IMMUNOLOGIC NEGATIVE: 1
EYES NEGATIVE: 1
ABDOMINAL PAIN: 0
ABDOMINAL DISTENTION: 0
BACK PAIN: 1
GASTROINTESTINAL NEGATIVE: 1

## 2022-07-06 NOTE — PROGRESS NOTES
Attending Physician Statement  I have discussed the care of Pat Escobedo including pertinent history and exam findings,  with the resident. I have reviewed the key elements of all parts of the encounter with the resident. I agree with the assessment, plan and orders as documented by the resident.   (GE Modifier)    MD EMILY Zuluaga  Attending Physician, [address] [place] [address] Internal Medicine Residency Program  [address] [place] [address]  7/6/2022, 2:59 PM

## 2022-07-06 NOTE — PROGRESS NOTES
[facility] [address] [address]  Dept: [phone]  Dept Fax: [phone]    Office Progress/Follow Up Note  Date ofpatient's visit: 7/7/2022  Patient's Name:  Crystal Goddard YOB: 1955            Patient Care Team:  Malena Cherry MD as PCP - General (Internal Medicine)  Angélica Matthews MD as Consulting Physician (Pulmonology)  Mary Swartz RN as Nurse Indira Beltran MD as Consulting Physician (Gastroenterology)  ================================================================    REASON FOR VISIT/CHIEF COMPLAINT:  Hypertension (3 month f/u ) and Health Maintenance (decline vaccines, wants pneu vaccine, )    HISTORY OF PRESENTING ILLNESS:  History was obtained from: patient, electronic medical record. Yue Reed a 78 y.o. is here for a follow-up for uncontrolled stage II hypertension. Patient is currently compliant with his home medications of losartan 50 mg, Coreg 6.25 mg twice daily. Asymptomatic. Does not measure his ambulatory blood pressure readings. In office blood pressure readings were elevated at 172/88. Cautious about his diet currently on low-sodium diet has been ambulating good and exercising with brisk walking. He has been actively smoking complains of low back pain nonradiating likely musculoskeletal without any radiation. Has been taking Tylenol as needed which controls his pain. Smoking 4 cigarettes/day with 30-pack-year smoking history. Had CT lung cancer screening done on November 2021 which is negative for any malignancy. Patient has hep C that was untreated has been following with GI pending labs ultrasound showed some cirrhotic findings. He denies any other complaints in this office visit  Health maintenance due for pneumococcal vaccine. Patient is acceptable for getting pneumococcal vaccine we will give him Prevnar 20.     Patient is due to finish his lab work-up from GI visit recently. Patient Active Problem List   Diagnosis    Essential hypertension    Cigarette smoker    Alcohol abuse    Intractable migraine without status migrainosus    Emphysema lung seen on Low Dose CT Chest 9/2019 [facility])    Chronic hepatitis C without hepatic coma (HCC)    Restrictive lung disease    Adenomyomatosis of gallbladder    Musculoskeletal back pain    Diastolic dysfunction    Alcoholic cirrhosis of liver with ascites (HCC)    Prediabetes    Hypokalemia       Health Maintenance Due   Topic Date Due    Annual Wellness Visit (AWV)  Never done    Prostate Specific Antigen (PSA) Screening or Monitoring  Never done       No Known Allergies      Current Outpatient Medications   Medication Sig Dispense Refill    hydroCHLOROthiazide (HYDRODIURIL) 25 MG tablet Take 1 tablet by mouth daily 90 tablet 1    amLODIPine (NORVASC) 5 MG tablet Take 1 tablet by mouth daily 30 tablet 0    ACETAMINOPHEN EXTRA STRENGTH 500 MG tablet TAKE 1 TABLET BY MOUTH TWO TIMES A DAY AS NEEDED FOR PAIN 60 tablet 0    simethicone (MYLICON) 80 MG chewable tablet Take 1 tablet by mouth 4 times daily as needed for Flatulence 180 tablet 3    albuterol sulfate  (90 Base) MCG/ACT inhaler INHALE 2 PUFF BY MOUTH EVERY 6 HOURS INTO LUNGS AS NEEDED FOR WHEEZING 18 g 3    losartan (COZAAR) 50 MG tablet Take 1 tablet by mouth daily 90 tablet 1    famotidine (PEPCID) 20 MG tablet TAKE 1 TABLET BY MOUTH 2 TIMES A DAY 60 tablet 11    tiotropium (SPIRIVA RESPIMAT) 2.5 MCG/ACT AERS inhaler Inhale 2 puffs into the lungs daily 1 each 1    tiZANidine (ZANAFLEX) 2 MG tablet TAKE 1 TABLET BY MOUTH NIGHTLY AS NEEDED FOR MUSCLE SPASMS 30 tablet 1     No current facility-administered medications for this visit.      Images carefully    Social History     Tobacco Use    Smoking status: Current Every Day Smoker     Packs/day: 0.75     Years: 41.00     Pack years: 30.75     Types: Cigarettes     Start date: 2/26/1977    Smokeless tobacco: Former User    Tobacco comment: 4 per day currently   Vaping Use    Vaping Use: Never used   Substance Use Topics    Alcohol use: Not Currently     Alcohol/week: 14.0 standard drinks     Types: 14 Cans of beer per week     Comment: somedays    Drug use: No       Family History   Problem Relation Age of Onset    Heart Disease Mother     High Blood Pressure Mother     Stroke Father     High Blood Pressure Father     Cancer Sister     Heart Disease Sister         REVIEW OF SYSTEMS:  Review of Systems   Constitutional: Negative. HENT: Negative. Eyes: Negative. Respiratory: Negative. Cardiovascular: Negative. Gastrointestinal: Negative. Negative for abdominal distention and abdominal pain. Endocrine: Negative. Genitourinary: Negative. Musculoskeletal: Positive for back pain. Skin: Negative. Allergic/Immunologic: Negative. Neurological: Negative. Hematological: Negative. PHYSICAL EXAM:  Vitals:    07/06/22 1402 07/06/22 1407   BP: (!) 181/85 (!) 172/88   Site:  Left Upper Arm   Position:  Sitting   Cuff Size:  Medium Adult   Pulse: 50 51   Temp: 97.3 °F (36.3 °C)    TempSrc: Infrared    SpO2: 98%    Weight: 186 lb (84.4 kg)    Height: 5' 8\" (1.727 m)      BP Readings from Last 3 Encounters:   07/06/22 (!) 172/88   05/06/22 (!) 192/92   04/06/22 (!) 154/82        Physical Exam  Constitutional:       General: He is awake. He is not in acute distress. Appearance: Normal appearance. He is well-developed. HENT:      Head: Normocephalic and atraumatic. Nose: Nose normal.      Mouth/Throat:      Mouth: Mucous membranes are moist.   Eyes:      Conjunctiva/sclera: Conjunctivae normal.   Cardiovascular:      Rate and Rhythm: Normal rate. Pulses: Normal pulses. Heart sounds: Normal heart sounds, S1 normal and S2 normal.   Pulmonary:      Effort: Pulmonary effort is normal.      Breath sounds: Normal breath sounds and air entry.    Musculoskeletal: General: Tenderness present. No swelling, deformity or signs of injury. Right knee: Normal.      Left knee: Normal.      Right lower leg: No edema. Left lower leg: No edema. Skin:     General: Skin is moist.   Neurological:      General: No focal deficit present. Mental Status: He is alert and oriented to person, place, and time. Mental status is at baseline. Psychiatric:         Attention and Perception: Attention normal.         Mood and Affect: Mood normal.         Behavior: Behavior normal. Behavior is cooperative. Thought Content: Thought content normal.           DIAGNOSTIC FINDINGS:  CBC:  Lab Results   Component Value Date/Time    WBC 9.2 07/06/2022 03:37 PM    HGB 14.8 07/06/2022 03:37 PM     07/06/2022 03:37 PM       BMP:    Lab Results   Component Value Date/Time     07/06/2022 03:37 PM    K 4.4 07/06/2022 03:37 PM     07/06/2022 03:37 PM    CO2 22 07/06/2022 03:37 PM    BUN 15 07/06/2022 03:37 PM    CREATININE 0.99 07/06/2022 03:37 PM    GLUCOSE 86 07/06/2022 03:37 PM       HEMOGLOBIN A1C:   Lab Results   Component Value Date/Time    LABA1C 6.1 04/06/2022 01:41 PM       FASTING LIPID PANEL:  Lab Results   Component Value Date    CHOL 146 04/07/2021    HDL 64 04/07/2021    TRIG 59 04/07/2021       ASSESSMENT AND PLAN:      Phylicia Osullivan was seen today for hypertension and health maintenance. Diagnoses and all orders for this visit:    Essential hypertension  -     hydroCHLOROthiazide (HYDRODIURIL) 25 MG tablet; Take 1 tablet by mouth daily  -     amLODIPine (NORVASC) 5 MG tablet;  Take 1 tablet by mouth daily    Musculoskeletal back pain    Cigarette smoker  -     Pneumococcal, PCV20, PREVNAR 20, (age 25 yrs+), IM, PF    Need for vaccination  -     Pneumococcal, PCV20, PREVNAR 20, (age 25 yrs+), IM, PF      -Patient was advised to check his ambulatory blood pressure readings daily maintain a log of it, continue DASH diet, advised smoking cessation, stop ibuprofen, will start him on hydrochlorothiazide 25 mg and amlodipine 5 mg  daily we will follow-up on CMP as patient has history of hypokalemia. Currently denies any cramps. Patient was advised to keep hydrated. -Continue losartan 50 mg   -Continue Tylenol for musculoskeletal back pain  Smoking cessation has been advised to patient. Will give Prevnar 24 pneumococcal vaccine    FOLLOW UP AND INSTRUCTIONS:  Return in about 4 weeks (around 8/3/2022) for bp. · Ivettsreedhar Cole received counseling on the following healthy behaviors: nutrition, exercise, medication adherence, tobacco cessation and decrease in alcohol consumption. · Discussed use, benefit, and side effects of prescribed medications. Barriers to medication compliance addressed. All patient questions answered. Pt voiced understanding. · Patient given educational materials - see patient instructions    Nayana Crum MD  Internal Medicine Resident, PGY- [facility]; [place], [place]  7/7/2022, 12:07 AM      This note is created with the assistance of a speech-recognition program. While intending to generate a document that actually reflects the content of thevisit, the document can still have some mistakes which may not have been identified and corrected by editing.

## 2022-07-06 NOTE — PATIENT INSTRUCTIONS
Hypertension Instructions :     Learn to measure your own blood pressure. Keep a record Blood pressure log of your results and bring it to the office on next visit , we will go through the readings . Take your blood pressure medicine exactly as directed. Dont skip doses. Missing doses can cause your blood pressure to get out of control. Stay at a healthy weight. Get help to lose any extra pounds . Cut back on salt. ideal amount of sodium is no more than 1,500 mg a day. Follow the DASH (Dietary Approaches to Stop Hypertension) eating plan. This plan recommends vegetables, fruits, whole gains. Begin an exercise program. Simple activities such as walking can help. Limit drinks that contain caffeine such as coffee, black or green tea, and cola to 2 per day. Limit alcohol intake if any. Call us right away if you have any of the following:  ? Chest pain or shortness of breath. ? Moderate to severe headache  ? Weakness in the muscles of your face, arms, or legs  ? Trouble speaking  ? Extreme drowsiness  ? Confusion  ? Fainting or dizziness  ? Pulsating or rushing sound in your ears  ? Unexplained nosebleed  ? Weakness, tingling, or numbness of your face, arms, or legs  ? Change in vision  ? Blood pressure measured at home that is greater than 180/110      An After Visit Summary was printed and given to the patient.   LEV

## 2022-07-07 LAB
HBV SURFACE AB TITR SER: <3.5 MIU/ML
HEPATITIS C ANTIBODY: REACTIVE
HEPATITIS C RNA PCR QUANT: DETECTED
HEPATITIS C RNA QUANT LOG: 6.66 LOG IU/ML
HEPATITIS C RNA-PCR: ABNORMAL IU/ML
HIV AG/AB: NONREACTIVE
SOURCE: ABNORMAL

## 2022-07-08 LAB
HAV AB SERPL IA-ACNC: NONREACTIVE
HEPATITIS B CORE TOTAL ANTIBODY: NONREACTIVE

## 2022-07-11 LAB
ALANINE AMINOTRANSFERASE, FIBROMETER: 58 U/L (ref 5–50)
ALPHA-2-MACROGLOBULIN, FIBROMETER: 378 MG/DL (ref 131–293)
ASPARTATE AMINOTRANSFERASE, FIBROMETER: 49 U/L (ref 9–50)
CIRRHOMETER PATIENT SCORE: 0.05
EER FIBROMETER REPORT: ABNORMAL
FIBROMETER INTERPRETATION: ABNORMAL
FIBROMETER PATIENT SCORE: 0.87
FIBROMETER PLATELET COUNT: 196
FIBROMETER PROTHROMBIN INDEX: 103 % (ref 90–120)
FIBROSIS METAVIR CLASSIFICATION: ABNORMAL
GAMMA GLUTAMYL TRANSFERASE, FIBROMETER: 253 U/L (ref 7–51)
INFLAMETER METAVIR CLASSIFICATION: ABNORMAL
INFLAMETER PATIENT SCORE: 0.55
UREA NITROGEN, FIBROMETER: 16 MG/DL (ref 7–20)

## 2022-07-13 LAB — HEPATITIS C GENOTYPE: NORMAL

## 2022-08-24 ENCOUNTER — TELEPHONE (OUTPATIENT)
Dept: INTERNAL MEDICINE | Age: 67
End: 2022-08-24

## 2022-10-03 ENCOUNTER — TELEPHONE (OUTPATIENT)
Dept: ONCOLOGY | Age: 67
End: 2022-10-03

## 2023-02-16 ENCOUNTER — TELEPHONE (OUTPATIENT)
Dept: INTERNAL MEDICINE | Age: 68
End: 2023-02-16

## 2023-03-08 ENCOUNTER — OFFICE VISIT (OUTPATIENT)
Dept: INTERNAL MEDICINE | Age: 68
End: 2023-03-08
Payer: COMMERCIAL

## 2023-03-08 VITALS
BODY MASS INDEX: 27.74 KG/M2 | OXYGEN SATURATION: 99 % | WEIGHT: 183 LBS | HEART RATE: 71 BPM | TEMPERATURE: 97.3 F | HEIGHT: 68 IN | SYSTOLIC BLOOD PRESSURE: 162 MMHG | DIASTOLIC BLOOD PRESSURE: 86 MMHG

## 2023-03-08 DIAGNOSIS — R73.03 PREDIABETES: ICD-10-CM

## 2023-03-08 DIAGNOSIS — Z23 FLU VACCINE NEED: ICD-10-CM

## 2023-03-08 DIAGNOSIS — Z87.891 PERSONAL HISTORY OF TOBACCO USE: ICD-10-CM

## 2023-03-08 DIAGNOSIS — B18.2 CHRONIC HEPATITIS C WITHOUT HEPATIC COMA (HCC): ICD-10-CM

## 2023-03-08 DIAGNOSIS — K70.30 ALCOHOLIC CIRRHOSIS OF LIVER WITHOUT ASCITES (HCC): ICD-10-CM

## 2023-03-08 DIAGNOSIS — I10 ESSENTIAL HYPERTENSION: Primary | ICD-10-CM

## 2023-03-08 DIAGNOSIS — Z12.2 SCREENING FOR LUNG CANCER: ICD-10-CM

## 2023-03-08 PROCEDURE — G0296 VISIT TO DETERM LDCT ELIG: HCPCS | Performed by: STUDENT IN AN ORGANIZED HEALTH CARE EDUCATION/TRAINING PROGRAM

## 2023-03-08 PROCEDURE — 83036 HEMOGLOBIN GLYCOSYLATED A1C: CPT | Performed by: STUDENT IN AN ORGANIZED HEALTH CARE EDUCATION/TRAINING PROGRAM

## 2023-03-08 PROCEDURE — G0008 ADMIN INFLUENZA VIRUS VAC: HCPCS | Performed by: INTERNAL MEDICINE

## 2023-03-08 RX ORDER — HYDROCHLOROTHIAZIDE 25 MG/1
25 TABLET ORAL DAILY
Qty: 90 TABLET | Refills: 0 | Status: SHIPPED | OUTPATIENT
Start: 2023-03-08

## 2023-03-08 RX ORDER — BLOOD PRESSURE TEST KIT
1 KIT MISCELLANEOUS DAILY
Qty: 1 KIT | Refills: 0 | Status: SHIPPED | OUTPATIENT
Start: 2023-03-08

## 2023-03-08 RX ORDER — LOSARTAN POTASSIUM 50 MG/1
50 TABLET ORAL DAILY
Qty: 90 TABLET | Refills: 0 | Status: SHIPPED | OUTPATIENT
Start: 2023-03-08

## 2023-03-08 RX ORDER — AMLODIPINE BESYLATE 5 MG/1
5 TABLET ORAL DAILY
Qty: 30 TABLET | Refills: 2 | Status: SHIPPED | OUTPATIENT
Start: 2023-03-08

## 2023-03-08 SDOH — ECONOMIC STABILITY: FOOD INSECURITY: WITHIN THE PAST 12 MONTHS, YOU WORRIED THAT YOUR FOOD WOULD RUN OUT BEFORE YOU GOT MONEY TO BUY MORE.: NEVER TRUE

## 2023-03-08 SDOH — ECONOMIC STABILITY: INCOME INSECURITY: HOW HARD IS IT FOR YOU TO PAY FOR THE VERY BASICS LIKE FOOD, HOUSING, MEDICAL CARE, AND HEATING?: NOT HARD AT ALL

## 2023-03-08 SDOH — ECONOMIC STABILITY: HOUSING INSECURITY
IN THE LAST 12 MONTHS, WAS THERE A TIME WHEN YOU DID NOT HAVE A STEADY PLACE TO SLEEP OR SLEPT IN A SHELTER (INCLUDING NOW)?: NO

## 2023-03-08 SDOH — ECONOMIC STABILITY: FOOD INSECURITY: WITHIN THE PAST 12 MONTHS, THE FOOD YOU BOUGHT JUST DIDN'T LAST AND YOU DIDN'T HAVE MONEY TO GET MORE.: NEVER TRUE

## 2023-03-08 ASSESSMENT — ENCOUNTER SYMPTOMS
BLOOD IN STOOL: 0
GASTROINTESTINAL NEGATIVE: 1
RESPIRATORY NEGATIVE: 1
SHORTNESS OF BREATH: 0
ABDOMINAL DISTENTION: 0
ABDOMINAL PAIN: 0
VOMITING: 0
COUGH: 0
ANAL BLEEDING: 0
WHEEZING: 0

## 2023-03-08 ASSESSMENT — PATIENT HEALTH QUESTIONNAIRE - PHQ9
SUM OF ALL RESPONSES TO PHQ QUESTIONS 1-9: 0
2. FEELING DOWN, DEPRESSED OR HOPELESS: 0
SUM OF ALL RESPONSES TO PHQ QUESTIONS 1-9: 0
SUM OF ALL RESPONSES TO PHQ9 QUESTIONS 1 & 2: 0
SUM OF ALL RESPONSES TO PHQ QUESTIONS 1-9: 0
SUM OF ALL RESPONSES TO PHQ QUESTIONS 1-9: 0
1. LITTLE INTEREST OR PLEASURE IN DOING THINGS: 0

## 2023-03-08 NOTE — PROGRESS NOTES
Attending Physician Statement  I have discussed the care of Jerry Pimentel, including pertinent history and exam findings,  with the resident. I have reviewed the key elements of all parts of the encounter with the resident. I agree with the assessment, plan and orders as documented by the resident.   (GE Modifier)

## 2023-03-08 NOTE — PROGRESS NOTES
[facility] [address] [address]  Dept: [phone]  Dept Fax: [phone]    Office Progress/Follow Up Note  Date ofpatient's visit: 3/8/2023  Patient's Name:  Cortez Saldaña YOB: 1955            Patient Care Team:  Timothy Canales MD as PCP - General (Internal Medicine)  Zenaida Sanders MD as Consulting Physician (Pulmonology)  Elsie Snellen, MD as Consulting Physician (Gastroenterology)  Chris Fall MD as Consulting Physician (Gastroenterology)  ================================================================    REASON FOR VISIT/CHIEF COMPLAINT:  Hypertension (Pt states he is out of bp meds and has not been currently taking meds ) and Health Maintenance (Flu shot /Low ct /Cologaurd)    HISTORY OF PRESENTING ILLNESS:    History was obtained from: patient, electronic medical record. Karina Reed a 80 y.o. is here for a follow up for hypertension. Hypertension: Patient here for follow-up of elevated blood pressure. He is exercising and is adherent to low salt diet. Blood pressure is not well controlled at home. Cardiac symptoms none. Patient denies none. Cardiovascular risk factors: advanced age (older than 54 for men, 72 for women), dyslipidemia, hypertension, male gender, and smoking/ tobacco exposure. Use of agents associated with hypertension: none. History of target organ damage: none. Patient has not been taking his medications of losartan 50, hydrochlorothiazide 25 mg and amlodipine 5. He is noncompliant with his home ambulatory blood pressure readings. Patient indicates he has been moved out of [place] to [place] secondary to family reasons. He lost his girlfriend recently and he came back to [place] now. Do not have any signs of any depression. Prediabetes - A1c 5.8, currently diet controlled not on any oral hypoglycemic drugs.     Alcoholic cirrhosis with Hepatitis C with untreated hep C, patient was evaluated by GI on July 2022 with pretreatment labs showing HCVRNA quantitative PCR showing 30 C virus levels are high and he has not received treatment yet. He has not followed up with GI secondary to being in [facility] for family reasons and he came back to [place] now. His last liver function test showed mild transaminitis. Patient did not have any signs of abdominal distention, ascites, esophageal varices. Patient declined to have an EGD and colonoscopy when offered by his gastroenterologist.  Currently has been drinking for 18 ounce beer every month as per patient. Chronic emphysema with active smoking cigarettes 1 pack lasting week and half. Has been using her inhalers with no reported increased frequency of uses. Patient reported compliance with Spiriva and albuterol as needed. Due for CT lung cancer screening    Maintenance he is due for flu vaccine which she is willing to get an office visit today. Due for CT lung cancer screening and Cologuard.       Patient Active Problem List   Diagnosis    Essential hypertension    Cigarette smoker    Alcohol abuse    Intractable migraine without status migrainosus    Emphysema lung seen on Low Dose CT Chest 9/2019 [facility])    Chronic hepatitis C without hepatic coma (HCC)    Restrictive lung disease    Adenomyomatosis of gallbladder    Musculoskeletal back pain    Diastolic dysfunction    Alcoholic cirrhosis of liver with ascites ([facility] [address]    Prediabetes    Hypokalemia       Health Maintenance Due   Topic Date Due    Annual Wellness Visit (AWV)  Never done    Low dose CT lung screening  11/01/2022    Colorectal Cancer Screen  01/28/2023    A1C test (Diabetic or Prediabetic)  04/06/2023       No Known Allergies      Current Outpatient Medications   Medication Sig Dispense Refill    Blood Pressure KIT 1 Units by Does not apply route daily 1 kit 0    losartan (COZAAR) 50 MG tablet Take 1 tablet by mouth daily 90 tablet 0    amLODIPine (NORVASC) 5 MG tablet Take 1 tablet by mouth daily 30 tablet 2    hydroCHLOROthiazide (HYDRODIURIL) 25 MG tablet Take 1 tablet by mouth daily 90 tablet 0    ACETAMINOPHEN EXTRA STRENGTH 500 MG tablet TAKE 1 TABLET BY MOUTH TWO TIMES A DAY AS NEEDED FOR PAIN 60 tablet 0    simethicone (MYLICON) 80 MG chewable tablet Take 1 tablet by mouth 4 times daily as needed for Flatulence 180 tablet 3    albuterol sulfate  (90 Base) MCG/ACT inhaler INHALE 2 PUFF BY MOUTH EVERY 6 HOURS INTO LUNGS AS NEEDED FOR WHEEZING 18 g 3    famotidine (PEPCID) 20 MG tablet TAKE 1 TABLET BY MOUTH 2 TIMES A DAY 60 tablet 11    tiotropium (SPIRIVA RESPIMAT) 2.5 MCG/ACT AERS inhaler Inhale 2 puffs into the lungs daily 1 each 1     No current facility-administered medications for this visit. Social History     Tobacco Use    Smoking status: Every Day     Packs/day: 0.75     Years: 41.00     Pack years: 30.75     Types: Cigarettes     Start date: 2/26/1977    Smokeless tobacco: Former    Tobacco comments:     4 per day currently   Vaping Use    Vaping Use: Never used   Substance Use Topics    Alcohol use: Not Currently     Alcohol/week: 14.0 standard drinks     Types: 14 Cans of beer per week     Comment: somedays    Drug use: No       Family History   Problem Relation Age of Onset    Heart Disease Mother     High Blood Pressure Mother     Stroke Father     High Blood Pressure Father     Cancer Sister     Heart Disease Sister         REVIEW OF SYSTEMS:  Review of Systems   Constitutional: Negative. Negative for fatigue, fever and unexpected weight change. Respiratory: Negative. Negative for cough, shortness of breath and wheezing. Cardiovascular: Negative. Negative for chest pain, palpitations and leg swelling. Gastrointestinal: Negative. Negative for abdominal distention, abdominal pain, anal bleeding, blood in stool and vomiting.      PHYSICAL EXAM:  Vitals:    03/08/23 1338 03/08/23 1356   BP: (!) 176/91 (!) 162/86   Site: Left Upper Arm Left Upper Arm   Position: Sitting Sitting   Cuff Size: Large Adult Large Adult   Pulse: 71    Temp: 97.3 °F (36.3 °C)    TempSrc: Infrared    SpO2: 99%    Weight: 183 lb (83 kg)    Height: 5' 8\" (1.727 m)      BP Readings from Last 3 Encounters:   03/08/23 (!) 162/86   07/06/22 (!) 172/88   05/06/22 (!) 192/92        Physical Exam  Constitutional:       Appearance: Normal appearance. HENT:      Head: Normocephalic and atraumatic. Cardiovascular:      Rate and Rhythm: Normal rate and regular rhythm. Pulses: Normal pulses. Heart sounds: Normal heart sounds. Pulmonary:      Effort: No respiratory distress. Breath sounds: Normal breath sounds. No wheezing. Abdominal:      General: Abdomen is flat. There is no distension. Palpations: There is no mass. Tenderness: There is no abdominal tenderness. Musculoskeletal:         General: Normal range of motion. Neurological:      General: No focal deficit present. Mental Status: He is alert and oriented to person, place, and time. Psychiatric:         Mood and Affect: Mood normal.         Behavior: Behavior normal.         DIAGNOSTIC FINDINGS:  CBC:  Lab Results   Component Value Date/Time    WBC 9.2 07/06/2022 03:37 PM    HGB 14.8 07/06/2022 03:37 PM     07/06/2022 03:37 PM       BMP:    Lab Results   Component Value Date/Time     07/06/2022 03:37 PM    K 4.4 07/06/2022 03:37 PM     07/06/2022 03:37 PM    CO2 22 07/06/2022 03:37 PM    BUN 15 07/06/2022 03:37 PM    CREATININE 0.99 07/06/2022 03:37 PM    GLUCOSE 86 07/06/2022 03:37 PM       HEMOGLOBIN A1C:   Lab Results   Component Value Date/Time    LABA1C 6.1 04/06/2022 01:41 PM       FASTING LIPID PANEL:  Lab Results   Component Value Date    CHOL 146 04/07/2021    HDL 64 04/07/2021    TRIG 59 04/07/2021       ASSESSMENT AND PLAN:  Eugene Conway was seen today for hypertension and health maintenance.     Diagnoses and all orders for this visit:    Essential hypertension  -     Blood Pressure KIT; 1 Units by Does not apply route daily  -     losartan (COZAAR) 50 MG tablet; Take 1 tablet by mouth daily  -     amLODIPine (NORVASC) 5 MG tablet; Take 1 tablet by mouth daily  -     hydroCHLOROthiazide (HYDRODIURIL) 25 MG tablet; Take 1 tablet by mouth daily  -     Comprehensive Metabolic Panel; Future  -Noncompliant, restart losartan 50, amlodipine 5 mg daily along with hydrochlorothiazide 25, CMP in 1 week and in office follow-up in 4 weeks for blood pressure check    Prediabetes  -     POCT glycosylated hemoglobin (Hb A1C)  -A1c of 5.8, diet controlled    Chronic hepatitis C without hepatic coma (HCC)  -     Comprehensive Metabolic Panel; Future  -     US LIVER; Future  -     AFP Tumor Marker; Future  -He is still drinking alcohol 4 x 16 ounce beer in a month, educated on alcohol and smoking cessation. Will obtain ultrasound liver and AFP and follow-up with GI for treatment of hep C. Abdomen high liver function test  -Signs of alcohol has been strictly educated to the patient. Patient sounds understanding  -Him to stop taking illicit drugs    Alcoholic cirrhosis of liver without ascites ([facility] [address]  -     US LIVER; Future  -     AFP Tumor Marker; Future    Screening for lung cancer -CT lung cancer screening, educated on smoking cessation has been smoking 1 pack in a week. Flu vaccine need  -     Influenza, FLUZONE, (age 10 mo+), IM, PF, 0.5 mL  -     DC IM ADM PRQ ID SUBQ/IM NJXS 1 VACCINE    Personal history of tobacco use  -     DC VISIT TO DISCUSS LUNG CA SCREEN W LDCT  -     CT Lung Screen (Annual/Baseline); Future      FOLLOW UP AND INSTRUCTIONS:  Return in about 4 weeks (around 4/5/2023) for Hypertension follow-up. Nico Woodward received counseling on the following healthy behaviors: nutrition, exercise, medication adherence, tobacco cessation, and decrease in alcohol consumption    Discussed use, benefit, and side effects of prescribed medications. Barriers to medication compliance addressed.   All patient questions answered. Pt voiced understanding. Patient given educational materials - see patient instructions    Mary Jane Cisneros MD  Internal Medicine Resident, [facility]; [place], [place]  3/8/2023, 3:46 PM      This note is created with the assistance of a speech-recognition program. While intending to generate a document that actually reflects the content of thevisit, the document can still have some mistakes which may not have been identified and corrected by editing.

## 2023-03-08 NOTE — PATIENT INSTRUCTIONS
4 weeks f/u     -Bloodwork orders given to patient, they will have them done before their next visit.      -Your doctor has ordered a US CT scan  for you, please contact our scheduling department at [phone] to set up an appointment to have this completed before your next visit. Learning About Lung Cancer Screening  What is screening for lung cancer? Lung cancer screening is a way to find some lung cancers early, before a person has any symptoms of the cancer. Lung cancer screening may help those who have the highest risk for lung cancer--people age 48 and older who are or were heavy smokers. For most people, who aren't at increased risk, screening for lung cancer probably isn't helpful. Screening won't prevent cancer. And it may not find all lung cancers. Lung cancer screening may lower the risk of dying from lung cancer in a small number of people. How is it done? Lung cancer screening is done with a low-dose CT (computed tomography) scan. A CT scan uses X-rays, or radiation, to make detailed pictures of your body. Experts recommend that screening be done in medical centers that focus on finding and treating lung cancer. Who is screening recommended for? Lung cancer screening is recommended for people age 48 and older who are or were heavy smokers. That means people with a smoking history of at least 20 pack years. A pack year is a way to measure how heavy a smoker you are or were. To figure out your pack years, multiply how many packs a day on average (assuming 20 cigarettes per pack) you have smoked by how many years you have smoked. For example: If you smoked 1 pack a day for 20 years, that's 1 times 20. So you have a smoking history of 20 pack years. If you smoked 2 packs a day for 10 years, that's 2 times 10. So you have a smoking history of 20 pack years. Experts agree that screening is for people who have a high risk of lung cancer. But experts don't agree on what high risk means. Some say people age 48 or older with at least a 20-pack-year smoking history are high risk. Others say it's people age 54 or older with a 30-pack-year history. To see if you could benefit from screening, first find out if you are at high risk for lung cancer. Your doctor can help you decide your lung cancer risk. What are the risks of screening? CT screening for lung cancer isn't perfect. It can show an abnormal result when it turns out there wasn't any cancer. This is called a false-positive result. This means you may need more tests to make sure you don't have cancer. These tests can be harmful and cause a lot of worry. These tests may include more CT scans and invasive testing like a lung biopsy. In a biopsy, the doctor takes a sample of tissue from inside your lung so it can be looked at under a microscope. A biopsy is the only way to tell if you have lung cancer. If the biopsy finds cancer, you and your doctor will have to decide how or whether to treat it. Some lung cancers found on CT scans are harmless and would not have caused a problem if they had not been found through screening. But because doctors can't tell which ones will turn out to be harmless, most will be treated. This means that you may get treatment--including surgery, radiation, or chemotherapy--that you don't need. There is a risk of damage to cells or tissue from being exposed to radiation, including the small amounts used in CTs, X-rays, and other medical tests. Over time, exposure to radiation may cause cancer and other health problems. But in most cases, the risk of getting cancer from being exposed to small amounts of radiation is low. It's not a reason to avoid these tests for most people. What are the benefits of screening? Your scan may be normal (negative). For some people who are at higher risk, screening lowers the chance of dying of lung cancer. How much and how long you smoked helps to determine your risk level.  Screening can find some cancers early, when treatment may be more likely to work. What happens after screening? The results of your CT scan will be sent to your doctor. Someone from your care team will explain the results of your scan and answer any questions you may have. If you need any follow-up, he or she will help you understand what to do next. After a lung cancer screening, you can go back to your usual activities right away. A lung cancer screening test can't tell if you have lung cancer. If your results are positive, your doctor can't tell whether an abnormal finding is a harmless nodule, cancer, or something else without doing more tests. What can you do to help prevent lung cancer? Some lung cancers can't be prevented. But if you smoke, quitting smoking is the best step you can take to prevent lung cancer. If you want to quit, your doctor can recommend medicines or other ways to help. Follow-up care is a key part of your treatment and safety. Be sure to make and go to all appointments, and call your doctor if you are having problems. It's also a good idea to know your test results and keep a list of the medicines you take. Where can you learn more? Go to http://www.reardon.com/ and enter Q940 to learn more about \"Learning About Lung Cancer Screening. \"  Current as of: May 4, 2022               Content Version: 13.5  © 0315-8705 Healthwise, Incorporated. Care instructions adapted under license by [facility] ([facility]). If you have questions about a medical condition or this instruction, always ask your healthcare professional. [place] [address] any warranty or liability for your use of this information. Learning About Lung Cancer Screening  What is screening for lung cancer? Lung cancer screening is a way to find some lung cancers early, before a person has any symptoms of the cancer.   Lung cancer screening may help those who have the highest risk for lung cancer--people age 48 and older who are or were heavy smokers. For most people, who aren't at increased risk, screening for lung cancer probably isn't helpful. Screening won't prevent cancer. And it may not find all lung cancers. Lung cancer screening may lower the risk of dying from lung cancer in a small number of people. How is it done? Lung cancer screening is done with a low-dose CT (computed tomography) scan. A CT scan uses X-rays, or radiation, to make detailed pictures of your body. Experts recommend that screening be done in medical centers that focus on finding and treating lung cancer. Who is screening recommended for? Lung cancer screening is recommended for people age 48 and older who are or were heavy smokers. That means people with a smoking history of at least 20 pack years. A pack year is a way to measure how heavy a smoker you are or were. To figure out your pack years, multiply how many packs a day on average (assuming 20 cigarettes per pack) you have smoked by how many years you have smoked. For example: If you smoked 1 pack a day for 20 years, that's 1 times 20. So you have a smoking history of 20 pack years. If you smoked 2 packs a day for 10 years, that's 2 times 10. So you have a smoking history of 20 pack years. Experts agree that screening is for people who have a high risk of lung cancer. But experts don't agree on what high risk means. Some say people age 48 or older with at least a 20-pack-year smoking history are high risk. Others say it's people age 54 or older with a 30-pack-year history. To see if you could benefit from screening, first find out if you are at high risk for lung cancer. Your doctor can help you decide your lung cancer risk. What are the risks of screening? CT screening for lung cancer isn't perfect. It can show an abnormal result when it turns out there wasn't any cancer. This is called a false-positive result.  This means you may need more tests to make sure you don't have cancer. These tests can be harmful and cause a lot of worry. These tests may include more CT scans and invasive testing like a lung biopsy. In a biopsy, the doctor takes a sample of tissue from inside your lung so it can be looked at under a microscope. A biopsy is the only way to tell if you have lung cancer. If the biopsy finds cancer, you and your doctor will have to decide how or whether to treat it. Some lung cancers found on CT scans are harmless and would not have caused a problem if they had not been found through screening. But because doctors can't tell which ones will turn out to be harmless, most will be treated. This means that you may get treatment--including surgery, radiation, or chemotherapy--that you don't need. There is a risk of damage to cells or tissue from being exposed to radiation, including the small amounts used in CTs, X-rays, and other medical tests. Over time, exposure to radiation may cause cancer and other health problems. But in most cases, the risk of getting cancer from being exposed to small amounts of radiation is low. It's not a reason to avoid these tests for most people. What are the benefits of screening? Your scan may be normal (negative). For some people who are at higher risk, screening lowers the chance of dying of lung cancer. How much and how long you smoked helps to determine your risk level. Screening can find some cancers early, when treatment may be more likely to work. What happens after screening? The results of your CT scan will be sent to your doctor. Someone from your care team will explain the results of your scan and answer any questions you may have. If you need any follow-up, he or she will help you understand what to do next. After a lung cancer screening, you can go back to your usual activities right away. A lung cancer screening test can't tell if you have lung cancer.  If your results are positive, your doctor can't tell whether an abnormal finding is a harmless nodule, cancer, or something else without doing more tests. What can you do to help prevent lung cancer? Some lung cancers can't be prevented. But if you smoke, quitting smoking is the best step you can take to prevent lung cancer. If you want to quit, your doctor can recommend medicines or other ways to help. Follow-up care is a key part of your treatment and safety. Be sure to make and go to all appointments, and call your doctor if you are having problems. It's also a good idea to know your test results and keep a list of the medicines you take. Where can you learn more? Go to http://www.reardon.com/ and enter Q940 to learn more about \"Learning About Lung Cancer Screening. \"  Current as of: May 4, 2022               Content Version: 13.5  © 8738-5343 Healthwise, Incorporated. Care instructions adapted under license by [facility] ([facility]). If you have questions about a medical condition or this instruction, always ask your healthcare professional. [address] any warranty or liability for your use of this information.

## 2023-03-08 NOTE — PROGRESS NOTES
Discussed with the patient the current USPSTF guidelines released March 9, 2021 for screening for lung cancer. For adults aged 48 to [de-identified] years who have a 20 pack-year smoking history and currently smoke or have quit within the past 15 years the grade B recommendation is to:  Screen for lung cancer with low-dose computed tomography (LDCT) every year. Stop screening once a person has not smoked for 15 years or has a health problem that limits life expectancy or the ability to have lung surgery. The patient  reports that he has been smoking cigarettes. He started smoking about 46 years ago. He has a 30.75 pack-year smoking history. He has quit using smokeless tobacco.. Discussed with patient the risks and benefits of screening, including over-diagnosis, false positive rate, and total radiation exposure. The patient currently exhibits no signs or symptoms suggestive of lung cancer. Discussed with patient the importance of compliance with yearly annual lung cancer screenings and willingness to undergo diagnosis and treatment if screening scan is positive. In addition, the patient was counseled regarding the importance of remaining smoke free and/or total smoking cessation.     Also reviewed the following if the patient has Medicare that as of February 10, 2022, Medicare only covers LDCT screening in patients aged 51-72 with at least a 20 pack-year smoking history who currently smoke or have quit in the last 15 years

## 2023-03-20 ENCOUNTER — HOSPITAL ENCOUNTER (OUTPATIENT)
Age: 68
Setting detail: SPECIMEN
Discharge: HOME | End: 2023-03-20

## 2023-03-20 DIAGNOSIS — B18.2 CHRONIC HEPATITIS C WITHOUT HEPATIC COMA (HCC): ICD-10-CM

## 2023-03-20 DIAGNOSIS — I10 ESSENTIAL HYPERTENSION: ICD-10-CM

## 2023-03-20 DIAGNOSIS — K70.30 ALCOHOLIC CIRRHOSIS OF LIVER WITHOUT ASCITES (HCC): ICD-10-CM

## 2023-03-20 LAB
ALBUMIN SERPL-MCNC: 4.3 G/DL (ref 3.5–5.2)
ALBUMIN/GLOBULIN RATIO: 1.1 (ref 1–2.5)
ALP SERPL-CCNC: 119 U/L (ref 40–129)
ALT SERPL-CCNC: 32 U/L (ref 5–41)
ALT SERPL-CCNC: 6.8 UG/L
ANION GAP SERPL CALCULATED.3IONS-SCNC: 12 MMOL/L (ref 9–17)
AST SERPL-CCNC: 28 U/L
BILIRUB SERPL-MCNC: 0.6 MG/DL (ref 0.3–1.2)
BUN SERPL-MCNC: 12 MG/DL (ref 8–23)
CALCIUM SERPL-MCNC: 9.8 MG/DL (ref 8.6–10.4)
CHLORIDE SERPL-SCNC: 103 MMOL/L (ref 98–107)
CO2 SERPL-SCNC: 24 MMOL/L (ref 20–31)
CREAT SERPL-MCNC: 0.81 MG/DL (ref 0.7–1.2)
GFR SERPL CREATININE-BSD FRML MDRD: >60 ML/MIN/1.73M2
GLUCOSE SERPL-MCNC: 99 MG/DL (ref 70–99)
POTASSIUM SERPL-SCNC: 4.7 MMOL/L (ref 3.7–5.3)
PROT SERPL-MCNC: 8.1 G/DL (ref 6.4–8.3)
SODIUM SERPL-SCNC: 139 MMOL/L (ref 135–144)

## 2023-03-23 NOTE — RESULT ENCOUNTER NOTE
Pc to pt gave him results, reminded him of ultrasound and to follow up with GI , pt stated he understood

## 2023-04-12 PROBLEM — E87.6 HYPOKALEMIA: Status: RESOLVED | Noted: 2021-05-06 | Resolved: 2023-04-12

## 2023-04-12 PROBLEM — K70.30 ALCOHOLIC CIRRHOSIS OF LIVER WITHOUT ASCITES (HCC): Status: ACTIVE | Noted: 2021-04-01

## 2023-04-12 PROBLEM — J43.9 EMPHYSEMA LUNG (HCC): Chronic | Status: ACTIVE | Noted: 2018-08-17

## 2023-04-12 PROBLEM — K70.31 ALCOHOLIC CIRRHOSIS OF LIVER WITH ASCITES (HCC): Chronic | Status: ACTIVE | Noted: 2021-04-01

## 2023-04-12 PROBLEM — B18.2 CHRONIC HEPATITIS C WITHOUT HEPATIC COMA (HCC): Chronic | Status: ACTIVE | Noted: 2019-03-27

## 2023-04-19 ENCOUNTER — TELEPHONE (OUTPATIENT)
Dept: INTERNAL MEDICINE | Age: 68
End: 2023-04-19

## 2023-04-19 NOTE — TELEPHONE ENCOUNTER
Spiriva Respimat 2. 5MCG/ACT IN AERS  The request was approved for the following time period:  01/01/2023 - 12/31/2023

## 2023-07-18 ENCOUNTER — OFFICE VISIT (OUTPATIENT)
Dept: INTERNAL MEDICINE | Age: 68
End: 2023-07-18
Payer: COMMERCIAL

## 2023-07-18 VITALS
WEIGHT: 178 LBS | TEMPERATURE: 98.1 F | DIASTOLIC BLOOD PRESSURE: 71 MMHG | OXYGEN SATURATION: 98 % | HEART RATE: 90 BPM | BODY MASS INDEX: 26.98 KG/M2 | SYSTOLIC BLOOD PRESSURE: 146 MMHG | HEIGHT: 68 IN

## 2023-07-18 DIAGNOSIS — I10 ESSENTIAL HYPERTENSION: ICD-10-CM

## 2023-07-18 DIAGNOSIS — F17.210 CIGARETTE SMOKER: ICD-10-CM

## 2023-07-18 DIAGNOSIS — K70.30 ALCOHOLIC CIRRHOSIS OF LIVER WITHOUT ASCITES (HCC): ICD-10-CM

## 2023-07-18 DIAGNOSIS — R73.03 PREDIABETES: ICD-10-CM

## 2023-07-18 DIAGNOSIS — F10.10 ALCOHOL ABUSE: ICD-10-CM

## 2023-07-18 DIAGNOSIS — Z23 NEED FOR HEPATITIS B BOOSTER VACCINATION: ICD-10-CM

## 2023-07-18 DIAGNOSIS — J43.9 PULMONARY EMPHYSEMA, UNSPECIFIED EMPHYSEMA TYPE (HCC): ICD-10-CM

## 2023-07-18 DIAGNOSIS — Z23 NEED FOR HEPATITIS A VACCINATION: ICD-10-CM

## 2023-07-18 DIAGNOSIS — G43.009 MIGRAINE WITHOUT AURA AND WITHOUT STATUS MIGRAINOSUS, NOT INTRACTABLE: Primary | ICD-10-CM

## 2023-07-18 PROCEDURE — 1123F ACP DISCUSS/DSCN MKR DOCD: CPT

## 2023-07-18 PROCEDURE — 99213 OFFICE O/P EST LOW 20 MIN: CPT

## 2023-07-18 PROCEDURE — 3078F DIAST BP <80 MM HG: CPT

## 2023-07-18 PROCEDURE — 3074F SYST BP LT 130 MM HG: CPT

## 2023-07-18 ASSESSMENT — ENCOUNTER SYMPTOMS
CHEST TIGHTNESS: 0
SORE THROAT: 0
ABDOMINAL PAIN: 0
VOMITING: 0
SHORTNESS OF BREATH: 0
DIARRHEA: 0
COUGH: 0
BACK PAIN: 0
CONSTIPATION: 0
RHINORRHEA: 0
NAUSEA: 0

## 2023-07-18 NOTE — PROGRESS NOTES
[facility]    New Patient Note/History and Physical    Date of patient's visit: 7/18/2023    Name:  Job Porter      YOB: 1955    Patient Care Team:  Gunner Ramirez MD as PCP - General (Internal Medicine)  Collette Pata, MD as Consulting Physician (Pulmonology)  Trish Bai MD as Consulting Physician (Gastroenterology)  Esau Sanders MD as Consulting Physician (Gastroenterology)    REASON FOR VISIT: Headache    HISTORY OF PRESENTING ILLNESS:    Patient, 80years old male, with past medical history of.  - Hypertension.  - Diastolic function. .  - Prediabetes  - Pulmonary emphysema. - Liver cirrhosis. - History of hepatitis C.  - Alcohol and smoking abuse. Presents to clinic today with chief complaints of headaches for past couple of months. Patient states that he started experiencing unilateral headache specially in [place]. Patient states that his headaches are sudden onset, unilateral and are better controlled with taking Tylenol. He also added that his headaches are sometimes associated with ringing sensation, photophobia that happens before onset of headache. It feels like patient is having migraine controlled with Tylenol. Patient also has history of hypertension for which he has been on amlodipine, hydrochlorothiazide and losartan. Patient states that he is compliant with all of his medications. Patient also states that he is doing regular exercises and he is very much concerned about his salt intake. He also has diastolic dysfunction proven from his echo done in 2017 showing Normal left ventricle size, wall thickness and function with an estimated EF 55%. No segmental wall motion abnormalities seen. Evidence of diastolic dysfunction. Patient also has history of pulmonary emphysema for which she is taking albuterol as needed with Spiriva via inhaler.   Patient states that he takes his Spiriva via daily and he uses his albuterol 2-3

## 2023-07-19 PROCEDURE — 90632 HEPA VACCINE ADULT IM: CPT | Performed by: STUDENT IN AN ORGANIZED HEALTH CARE EDUCATION/TRAINING PROGRAM

## 2023-07-19 PROCEDURE — 90744 HEPB VACC 3 DOSE PED/ADOL IM: CPT | Performed by: STUDENT IN AN ORGANIZED HEALTH CARE EDUCATION/TRAINING PROGRAM

## 2023-08-22 ENCOUNTER — OFFICE VISIT (OUTPATIENT)
Dept: INTERNAL MEDICINE | Age: 68
End: 2023-08-22
Payer: COMMERCIAL

## 2023-08-22 VITALS
WEIGHT: 181.4 LBS | TEMPERATURE: 98.1 F | HEIGHT: 68 IN | OXYGEN SATURATION: 96 % | SYSTOLIC BLOOD PRESSURE: 127 MMHG | HEART RATE: 74 BPM | DIASTOLIC BLOOD PRESSURE: 63 MMHG | BODY MASS INDEX: 27.49 KG/M2

## 2023-08-22 DIAGNOSIS — K21.9 GASTROESOPHAGEAL REFLUX DISEASE WITHOUT ESOPHAGITIS: ICD-10-CM

## 2023-08-22 DIAGNOSIS — B18.2 CHRONIC HEPATITIS C WITHOUT HEPATIC COMA (HCC): Chronic | ICD-10-CM

## 2023-08-22 DIAGNOSIS — F10.20 ALCOHOLISM (HCC): ICD-10-CM

## 2023-08-22 DIAGNOSIS — Z87.891 PERSONAL HISTORY OF TOBACCO USE: ICD-10-CM

## 2023-08-22 DIAGNOSIS — H53.8 BLURRY VISION, BILATERAL: ICD-10-CM

## 2023-08-22 DIAGNOSIS — Z00.00 INITIAL MEDICARE ANNUAL WELLNESS VISIT: Primary | ICD-10-CM

## 2023-08-22 PROBLEM — F10.99 ALCOHOL USE, UNSPECIFIED WITH UNSPECIFIED ALCOHOL-INDUCED DISORDER (HCC): Status: ACTIVE | Noted: 2023-08-22

## 2023-08-22 PROBLEM — F10.29 ALCOHOL DEPENDENCE WITH UNSPECIFIED ALCOHOL-INDUCED DISORDER (HCC): Status: ACTIVE | Noted: 2023-08-22

## 2023-08-22 PROCEDURE — 99211 OFF/OP EST MAY X REQ PHY/QHP: CPT | Performed by: INTERNAL MEDICINE

## 2023-08-22 PROCEDURE — 1123F ACP DISCUSS/DSCN MKR DOCD: CPT

## 2023-08-22 PROCEDURE — 3078F DIAST BP <80 MM HG: CPT

## 2023-08-22 PROCEDURE — 3074F SYST BP LT 130 MM HG: CPT

## 2023-08-22 PROCEDURE — G0438 PPPS, INITIAL VISIT: HCPCS

## 2023-08-22 PROCEDURE — 99213 OFFICE O/P EST LOW 20 MIN: CPT

## 2023-08-22 RX ORDER — FAMOTIDINE 20 MG/1
TABLET, FILM COATED ORAL
Qty: 60 TABLET | Refills: 11 | Status: SHIPPED | OUTPATIENT
Start: 2023-08-22

## 2023-08-22 ASSESSMENT — PATIENT HEALTH QUESTIONNAIRE - PHQ9
1. LITTLE INTEREST OR PLEASURE IN DOING THINGS: 0
SUM OF ALL RESPONSES TO PHQ QUESTIONS 1-9: 0
2. FEELING DOWN, DEPRESSED OR HOPELESS: 0
SUM OF ALL RESPONSES TO PHQ QUESTIONS 1-9: 0
SUM OF ALL RESPONSES TO PHQ9 QUESTIONS 1 & 2: 0
SUM OF ALL RESPONSES TO PHQ QUESTIONS 1-9: 0
SUM OF ALL RESPONSES TO PHQ QUESTIONS 1-9: 0

## 2023-08-22 ASSESSMENT — LIFESTYLE VARIABLES
HOW OFTEN DURING THE LAST YEAR HAVE YOU FAILED TO DO WHAT WAS NORMALLY EXPECTED FROM YOU BECAUSE OF DRINKING: 0
HOW MANY STANDARD DRINKS CONTAINING ALCOHOL DO YOU HAVE ON A TYPICAL DAY: 3 OR 4
HOW OFTEN DURING THE LAST YEAR HAVE YOU FOUND THAT YOU WERE NOT ABLE TO STOP DRINKING ONCE YOU HAD STARTED: 0
HOW OFTEN DURING THE LAST YEAR HAVE YOU NEEDED AN ALCOHOLIC DRINK FIRST THING IN THE MORNING TO GET YOURSELF GOING AFTER A NIGHT OF HEAVY DRINKING: 0
HOW OFTEN DURING THE LAST YEAR HAVE YOU HAD A FEELING OF GUILT OR REMORSE AFTER DRINKING: 0
HAS A RELATIVE, FRIEND, DOCTOR, OR ANOTHER HEALTH PROFESSIONAL EXPRESSED CONCERN ABOUT YOUR DRINKING OR SUGGESTED YOU CUT DOWN: 0
HOW OFTEN DO YOU HAVE A DRINK CONTAINING ALCOHOL: 2-3 TIMES A WEEK
HOW OFTEN DURING THE LAST YEAR HAVE YOU BEEN UNABLE TO REMEMBER WHAT HAPPENED THE NIGHT BEFORE BECAUSE YOU HAD BEEN DRINKING: 0
HAVE YOU OR SOMEONE ELSE BEEN INJURED AS A RESULT OF YOUR DRINKING: 0

## 2023-08-22 NOTE — PATIENT INSTRUCTIONS

## 2023-09-08 DIAGNOSIS — I10 ESSENTIAL HYPERTENSION: Chronic | ICD-10-CM

## 2023-09-08 RX ORDER — HYDROCHLOROTHIAZIDE 25 MG/1
TABLET ORAL
Qty: 90 TABLET | Refills: 0 | Status: SHIPPED | OUTPATIENT
Start: 2023-09-08

## 2023-09-08 NOTE — TELEPHONE ENCOUNTER
9/2019 ([address])     Chronic hepatitis C without hepatic coma (HCC)     Restrictive lung disease     Adenomyomatosis of gallbladder     Musculoskeletal back pain     Diastolic dysfunction     Alcoholic cirrhosis of liver without ascites (HCC)     Prediabetes     Alcoholism ([address])     Alcohol use, unspecified with unspecified alcohol-induced disorder     Alcohol dependence with unspecified alcohol-induced disorder     Alcohol dependence, uncomplicated

## 2023-12-14 DIAGNOSIS — I10 ESSENTIAL HYPERTENSION: Chronic | ICD-10-CM

## 2023-12-14 RX ORDER — HYDROCHLOROTHIAZIDE 25 MG/1
TABLET ORAL
Qty: 90 TABLET | Refills: 0 | Status: SHIPPED | OUTPATIENT
Start: 2023-12-14

## 2023-12-14 NOTE — TELEPHONE ENCOUNTER
.. Request for   Requested Prescriptions     Pending Prescriptions Disp Refills    hydroCHLOROthiazide (HYDRODIURIL) 25 MG tablet [Pharmacy Med Name: HYDROCHLOROTHIAZIDE 25MG TABS] 90 tablet 0     Sig: TAKE ONE TABLET BY MOUTH ONCE A DAY    . Please review and e-scribe to pharmacy listed in chart if appropriate. Thank you.       Last Visit Date: 8/22/2023  Next Visit Date: 12/26/2023    Future Appointments   Date Time Provider [address]   12/26/2023  2:00 PM Lorin Willett MD [facility]  [address] Maintenance   Topic Date Due    Respiratory Syncytial Virus (RSV) age 61 yrs+ (3 - 3-dose 60+ series) Never done    Low dose CT lung screening &/or counseling  11/01/2022    Flu vaccine (1) 08/01/2023    COVID-19 Vaccine (5 - 2023-24 season) 09/01/2023    Hepatitis B vaccine (3 of 3 - Risk 3-dose series) 09/13/2023    Shingles vaccine (1 of 2) 08/21/2024 (Originally 1/15/2006)    A1C test (Diabetic or Prediabetic)  06/14/2024    Depression Screen  08/22/2024    Annual Wellness Visit (AWV)  08/22/2024    DTaP/Tdap/Td vaccine (2 - Td or Tdap) 02/26/2026    Lipids  04/07/2026    Colorectal Cancer Screen  07/20/2026    Hepatitis A vaccine  Completed    Pneumococcal 65+ years Vaccine  Completed    AAA screen  Completed    Hib vaccine  Aged Out    Meningococcal (ACWY) vaccine  Aged Out    Pneumococcal 0-64 years Vaccine  Discontinued    HIV screen  Discontinued       Hemoglobin A1C (%)   Date Value   06/14/2023 5.4   04/06/2022 6.1   10/08/2020 5.8             ( goal A1C is < 7)   No components found for: \"LABMICR\"  LDL Cholesterol (mg/dL)   Date Value   04/07/2021 70       (goal LDL is <100)   AST (U/L)   Date Value   03/20/2023 28     ALT (U/L)   Date Value   03/20/2023 32     BUN (mg/dL)   Date Value   03/20/2023 12     BP Readings from Last 3 Encounters:   08/22/23 127/63   07/18/23 (!) 146/71   06/14/23 (!) 152/82          (goal 120/80)    All Future Testing planned in [facility]  Lab Frequency Next

## 2023-12-26 ENCOUNTER — OFFICE VISIT (OUTPATIENT)
Dept: INTERNAL MEDICINE | Age: 68
End: 2023-12-26
Payer: COMMERCIAL

## 2023-12-26 VITALS
DIASTOLIC BLOOD PRESSURE: 86 MMHG | SYSTOLIC BLOOD PRESSURE: 128 MMHG | RESPIRATION RATE: 16 BRPM | HEART RATE: 89 BPM | OXYGEN SATURATION: 98 % | HEIGHT: 67 IN | TEMPERATURE: 98 F | WEIGHT: 188 LBS | BODY MASS INDEX: 29.51 KG/M2

## 2023-12-26 DIAGNOSIS — I51.89 DIASTOLIC DYSFUNCTION: ICD-10-CM

## 2023-12-26 DIAGNOSIS — D13.5 ADENOMYOMATOSIS OF GALLBLADDER: ICD-10-CM

## 2023-12-26 DIAGNOSIS — R73.03 PREDIABETES: ICD-10-CM

## 2023-12-26 DIAGNOSIS — K70.30 ALCOHOLIC CIRRHOSIS OF LIVER WITHOUT ASCITES (HCC): ICD-10-CM

## 2023-12-26 DIAGNOSIS — Z23 NEEDS FLU SHOT: ICD-10-CM

## 2023-12-26 DIAGNOSIS — G43.919 INTRACTABLE MIGRAINE WITHOUT STATUS MIGRAINOSUS, UNSPECIFIED MIGRAINE TYPE: ICD-10-CM

## 2023-12-26 DIAGNOSIS — J43.9 PULMONARY EMPHYSEMA, UNSPECIFIED EMPHYSEMA TYPE (HCC): Primary | ICD-10-CM

## 2023-12-26 DIAGNOSIS — F17.210 CIGARETTE SMOKER: ICD-10-CM

## 2023-12-26 DIAGNOSIS — Z00.00 HEALTH CARE MAINTENANCE: ICD-10-CM

## 2023-12-26 DIAGNOSIS — I10 ESSENTIAL HYPERTENSION: Chronic | ICD-10-CM

## 2023-12-26 DIAGNOSIS — B18.2 CHRONIC HEPATITIS C WITHOUT HEPATIC COMA (HCC): Chronic | ICD-10-CM

## 2023-12-26 DIAGNOSIS — F10.20 ALCOHOLISM (HCC): ICD-10-CM

## 2023-12-26 PROBLEM — J43.8 OTHER EMPHYSEMA (HCC): Status: ACTIVE | Noted: 2018-08-17

## 2023-12-26 PROBLEM — M54.9 MUSCULOSKELETAL BACK PAIN: Status: RESOLVED | Noted: 2020-10-08 | Resolved: 2023-12-26

## 2023-12-26 PROBLEM — J98.4 RESTRICTIVE LUNG DISEASE: Status: RESOLVED | Noted: 2019-03-27 | Resolved: 2023-12-26

## 2023-12-26 PROCEDURE — 99211 OFF/OP EST MAY X REQ PHY/QHP: CPT | Performed by: INTERNAL MEDICINE

## 2023-12-26 PROCEDURE — G0008 ADMIN INFLUENZA VIRUS VAC: HCPCS | Performed by: INTERNAL MEDICINE

## 2023-12-26 PROCEDURE — 3074F SYST BP LT 130 MM HG: CPT

## 2023-12-26 PROCEDURE — 3079F DIAST BP 80-89 MM HG: CPT

## 2023-12-26 PROCEDURE — 1123F ACP DISCUSS/DSCN MKR DOCD: CPT

## 2023-12-26 PROCEDURE — 99213 OFFICE O/P EST LOW 20 MIN: CPT

## 2023-12-26 RX ORDER — ALBUTEROL SULFATE 90 UG/1
AEROSOL, METERED RESPIRATORY (INHALATION)
Qty: 18 G | Refills: 3 | Status: SHIPPED | OUTPATIENT
Start: 2023-12-26

## 2023-12-26 RX ORDER — ROSUVASTATIN CALCIUM 10 MG/1
10 TABLET, COATED ORAL DAILY
Qty: 90 TABLET | Refills: 1 | Status: SHIPPED | OUTPATIENT
Start: 2023-12-26

## 2023-12-26 RX ORDER — ASPIRIN 81 MG/1
81 TABLET ORAL DAILY
Qty: 90 TABLET | Refills: 1 | Status: SHIPPED | OUTPATIENT
Start: 2023-12-26

## 2023-12-26 NOTE — PROGRESS NOTES
[facility]  [facility] [address]  [address] [address]  Dept: [phone]  Dept Fax: [phone]    Office Progress/Follow Up Note  Date of patient's visit: 12/26/2023  Patient's Name:  Aiyana Tam YOB: 1955            Patient Care Team:  Miguelangel Khanna MD as PCP - General (Internal Medicine)  Rylan Doshi MD as Consulting Physician (Pulmonology)  Davon Willard MD as Consulting Physician (Gastroenterology)  Keegan Acosta MD as Consulting Physician (Gastroenterology)  ________________________________________________________________________      Reason for Visit: Routine outpatient follow up  ________________________________________________________________________  Chief Complaint:  Follow-up (Patient present today for 3 month follow up. Patient has no issues or complaints at this time.)    ________________________________________________________________________  History of Presenting Illness:  Patient, 80years old male, with past medical history of.  - Hypertension.  - Pulm emphysema. - Chronic untreated hepatitis C.  - Alcoholic cirrhosis without ascites. - Smoking history    Coming into the clinic for his routine follow-up    Patient stated that he has been taking his medications regularly. He denies missing doses of his medication. Patient states that he has been drinking alcohol but less than he was drinking previously. Patient also stated that he is smoking almost 3 to 4 cigarettes in a day. Patient ended that he just smokes 1 to 2 puffs and then he throws a cigarette away. Patient was a little bit reluctant and hesitant in telling the numbers of for alcohol drinks. Patient was just recently seen in ED secondary to his right ankle pain which patient states that has resolved.     Patient was also counseled to follow-up with his low-dose chest CT along with gastroenterology follow-up to which patient stated that he was quite busy but he

## 2024-01-24 ENCOUNTER — APPOINTMENT (OUTPATIENT)
Dept: CT IMAGING | Age: 69
DRG: 418 | End: 2024-01-24
Payer: MEDICARE

## 2024-01-24 ENCOUNTER — HOSPITAL ENCOUNTER (INPATIENT)
Age: 69
LOS: 2 days | Discharge: HOME | DRG: 418 | End: 2024-01-26
Attending: EMERGENCY MEDICINE | Admitting: STUDENT IN AN ORGANIZED HEALTH CARE EDUCATION/TRAINING PROGRAM
Payer: MEDICARE

## 2024-01-24 ENCOUNTER — APPOINTMENT (OUTPATIENT)
Dept: GENERAL RADIOLOGY | Age: 69
DRG: 418 | End: 2024-01-24
Payer: MEDICARE

## 2024-01-24 ENCOUNTER — APPOINTMENT (OUTPATIENT)
Dept: ULTRASOUND IMAGING | Age: 69
DRG: 418 | End: 2024-01-24
Payer: MEDICARE

## 2024-01-24 DIAGNOSIS — G89.18 POST-OPERATIVE PAIN: ICD-10-CM

## 2024-01-24 DIAGNOSIS — K81.0 ACUTE CHOLECYSTITIS: Primary | ICD-10-CM

## 2024-01-24 DIAGNOSIS — N13.30 HYDRONEPHROSIS OF RIGHT KIDNEY: ICD-10-CM

## 2024-01-24 LAB
ALBUMIN SERPL-MCNC: 4.3 G/DL (ref 3.5–5.2)
ALBUMIN/GLOB SERPL: 1 {RATIO} (ref 1–2.5)
ALP SERPL-CCNC: 96 U/L (ref 40–129)
ALT SERPL-CCNC: 30 U/L (ref 5–41)
ANION GAP SERPL CALCULATED.3IONS-SCNC: 14 MMOL/L (ref 9–17)
AST SERPL-CCNC: 28 U/L
BASOPHILS # BLD: 0 K/UL (ref 0–0.2)
BASOPHILS NFR BLD: 0 % (ref 0–2)
BILIRUB SERPL-MCNC: 0.6 MG/DL (ref 0.3–1.2)
BUN SERPL-MCNC: 27 MG/DL (ref 8–23)
CALCIUM SERPL-MCNC: 9.8 MG/DL (ref 8.6–10.4)
CHLORIDE SERPL-SCNC: 104 MMOL/L (ref 98–107)
CO2 SERPL-SCNC: 23 MMOL/L (ref 20–31)
CREAT SERPL-MCNC: 1.4 MG/DL (ref 0.7–1.2)
EOSINOPHIL # BLD: 0.24 K/UL (ref 0–0.4)
EOSINOPHILS RELATIVE PERCENT: 2 % (ref 1–4)
ERYTHROCYTE [DISTWIDTH] IN BLOOD BY AUTOMATED COUNT: 12 % (ref 11.8–14.4)
GFR SERPL CREATININE-BSD FRML MDRD: 55 ML/MIN/1.73M2
GLUCOSE SERPL-MCNC: 99 MG/DL (ref 70–99)
HCT VFR BLD AUTO: 45.2 % (ref 40.7–50.3)
HGB BLD-MCNC: 14.9 G/DL (ref 13–17)
IMM GRANULOCYTES # BLD AUTO: 0.12 K/UL (ref 0–0.3)
IMM GRANULOCYTES NFR BLD: 1 %
INR PPP: 1
LIPASE SERPL-CCNC: 38 U/L (ref 13–60)
LYMPHOCYTES NFR BLD: 4.01 K/UL (ref 1–4.8)
LYMPHOCYTES RELATIVE PERCENT: 34 % (ref 24–44)
MCH RBC QN AUTO: 31 PG (ref 25.2–33.5)
MCHC RBC AUTO-ENTMCNC: 33 G/DL (ref 28.4–34.8)
MCV RBC AUTO: 94 FL (ref 82.6–102.9)
MONOCYTES NFR BLD: 1.65 K/UL (ref 0.1–0.8)
MONOCYTES NFR BLD: 14 % (ref 1–7)
MORPHOLOGY: NORMAL
NEUTROPHILS NFR BLD: 49 % (ref 36–66)
NEUTS SEG NFR BLD: 5.78 K/UL (ref 1.8–7.7)
NRBC BLD-RTO: 0 PER 100 WBC
PARTIAL THROMBOPLASTIN TIME: 30 SEC (ref 23–36.5)
PLATELET # BLD AUTO: 202 K/UL (ref 138–453)
PMV BLD AUTO: 10.4 FL (ref 8.1–13.5)
POTASSIUM SERPL-SCNC: 4.1 MMOL/L (ref 3.7–5.3)
PROT SERPL-MCNC: 8.6 G/DL (ref 6.4–8.3)
PROTHROMBIN TIME: 12.9 SEC (ref 11.7–14.9)
RBC # BLD AUTO: 4.81 M/UL (ref 4.21–5.77)
SODIUM SERPL-SCNC: 141 MMOL/L (ref 135–144)
TROPONIN I SERPL HS-MCNC: 9 NG/L (ref 0–22)
TROPONIN I SERPL HS-MCNC: 9 NG/L (ref 0–22)
TROPONIN I SERPL HS-MCNC: <6 NG/L (ref 0–22)
WBC OTHER # BLD: 11.8 K/UL (ref 3.5–11.3)

## 2024-01-24 PROCEDURE — 51798 US URINE CAPACITY MEASURE: CPT

## 2024-01-24 PROCEDURE — 6370000000 HC RX 637 (ALT 250 FOR IP)

## 2024-01-24 PROCEDURE — 6360000002 HC RX W HCPCS: Performed by: STUDENT IN AN ORGANIZED HEALTH CARE EDUCATION/TRAINING PROGRAM

## 2024-01-24 PROCEDURE — 76705 ECHO EXAM OF ABDOMEN: CPT

## 2024-01-24 PROCEDURE — 6360000004 HC RX CONTRAST MEDICATION

## 2024-01-24 PROCEDURE — 83690 ASSAY OF LIPASE: CPT

## 2024-01-24 PROCEDURE — 6370000000 HC RX 637 (ALT 250 FOR IP): Performed by: NURSE PRACTITIONER

## 2024-01-24 PROCEDURE — 85610 PROTHROMBIN TIME: CPT

## 2024-01-24 PROCEDURE — G0378 HOSPITAL OBSERVATION PER HR: HCPCS

## 2024-01-24 PROCEDURE — 96361 HYDRATE IV INFUSION ADD-ON: CPT

## 2024-01-24 PROCEDURE — 85730 THROMBOPLASTIN TIME PARTIAL: CPT

## 2024-01-24 PROCEDURE — 1200000000 HC SEMI PRIVATE

## 2024-01-24 PROCEDURE — 80053 COMPREHEN METABOLIC PANEL: CPT

## 2024-01-24 PROCEDURE — 71046 X-RAY EXAM CHEST 2 VIEWS: CPT

## 2024-01-24 PROCEDURE — 84484 ASSAY OF TROPONIN QUANT: CPT

## 2024-01-24 PROCEDURE — 81001 URINALYSIS AUTO W/SCOPE: CPT

## 2024-01-24 PROCEDURE — 74177 CT ABD & PELVIS W/CONTRAST: CPT

## 2024-01-24 PROCEDURE — 99285 EMERGENCY DEPT VISIT HI MDM: CPT

## 2024-01-24 PROCEDURE — 96365 THER/PROPH/DIAG IV INF INIT: CPT

## 2024-01-24 PROCEDURE — 99223 1ST HOSP IP/OBS HIGH 75: CPT | Performed by: SURGERY

## 2024-01-24 PROCEDURE — 85025 COMPLETE CBC W/AUTO DIFF WBC: CPT

## 2024-01-24 PROCEDURE — 2580000003 HC RX 258

## 2024-01-24 PROCEDURE — 2580000003 HC RX 258: Performed by: NURSE PRACTITIONER

## 2024-01-24 PROCEDURE — 2580000003 HC RX 258: Performed by: STUDENT IN AN ORGANIZED HEALTH CARE EDUCATION/TRAINING PROGRAM

## 2024-01-24 RX ORDER — SODIUM CHLORIDE 9 MG/ML
INJECTION, SOLUTION INTRAVENOUS CONTINUOUS
Status: DISCONTINUED | OUTPATIENT
Start: 2024-01-24 | End: 2024-01-26

## 2024-01-24 RX ORDER — MAGNESIUM SULFATE 1 G/100ML
1000 INJECTION INTRAVENOUS PRN
Status: DISCONTINUED | OUTPATIENT
Start: 2024-01-24 | End: 2024-01-26 | Stop reason: HOSPADM

## 2024-01-24 RX ORDER — ENOXAPARIN SODIUM 100 MG/ML
40 INJECTION SUBCUTANEOUS DAILY
Status: DISCONTINUED | OUTPATIENT
Start: 2024-01-25 | End: 2024-01-25

## 2024-01-24 RX ORDER — 0.9 % SODIUM CHLORIDE 0.9 %
1000 INTRAVENOUS SOLUTION INTRAVENOUS ONCE
Status: COMPLETED | OUTPATIENT
Start: 2024-01-24 | End: 2024-01-24

## 2024-01-24 RX ORDER — MAGNESIUM HYDROXIDE/ALUMINUM HYDROXICE/SIMETHICONE 120; 1200; 1200 MG/30ML; MG/30ML; MG/30ML
30 SUSPENSION ORAL ONCE
Status: COMPLETED | OUTPATIENT
Start: 2024-01-24 | End: 2024-01-24

## 2024-01-24 RX ORDER — SODIUM CHLORIDE 0.9 % (FLUSH) 0.9 %
10 SYRINGE (ML) INJECTION PRN
Status: DISCONTINUED | OUTPATIENT
Start: 2024-01-24 | End: 2024-01-26 | Stop reason: HOSPADM

## 2024-01-24 RX ORDER — ACETAMINOPHEN 325 MG/1
650 TABLET ORAL EVERY 6 HOURS PRN
Status: DISCONTINUED | OUTPATIENT
Start: 2024-01-24 | End: 2024-01-26 | Stop reason: HOSPADM

## 2024-01-24 RX ORDER — POTASSIUM CHLORIDE 20 MEQ/1
40 TABLET, EXTENDED RELEASE ORAL PRN
Status: DISCONTINUED | OUTPATIENT
Start: 2024-01-24 | End: 2024-01-26 | Stop reason: HOSPADM

## 2024-01-24 RX ORDER — ONDANSETRON 2 MG/ML
4 INJECTION INTRAMUSCULAR; INTRAVENOUS EVERY 6 HOURS PRN
Status: DISCONTINUED | OUTPATIENT
Start: 2024-01-24 | End: 2024-01-26 | Stop reason: HOSPADM

## 2024-01-24 RX ORDER — POLYETHYLENE GLYCOL 3350 17 G/17G
17 POWDER, FOR SOLUTION ORAL DAILY PRN
Status: DISCONTINUED | OUTPATIENT
Start: 2024-01-24 | End: 2024-01-26 | Stop reason: HOSPADM

## 2024-01-24 RX ORDER — ACETAMINOPHEN 650 MG/1
650 SUPPOSITORY RECTAL EVERY 6 HOURS PRN
Status: DISCONTINUED | OUTPATIENT
Start: 2024-01-24 | End: 2024-01-26 | Stop reason: HOSPADM

## 2024-01-24 RX ORDER — ALBUTEROL SULFATE 90 UG/1
2 AEROSOL, METERED RESPIRATORY (INHALATION) EVERY 6 HOURS PRN
Status: DISCONTINUED | OUTPATIENT
Start: 2024-01-24 | End: 2024-01-26 | Stop reason: HOSPADM

## 2024-01-24 RX ORDER — SODIUM CHLORIDE 9 MG/ML
INJECTION, SOLUTION INTRAVENOUS PRN
Status: DISCONTINUED | OUTPATIENT
Start: 2024-01-24 | End: 2024-01-26 | Stop reason: HOSPADM

## 2024-01-24 RX ORDER — HYDROCODONE BITARTRATE AND ACETAMINOPHEN 5; 325 MG/1; MG/1
1 TABLET ORAL ONCE
Status: COMPLETED | OUTPATIENT
Start: 2024-01-24 | End: 2024-01-24

## 2024-01-24 RX ORDER — POTASSIUM CHLORIDE 7.45 MG/ML
10 INJECTION INTRAVENOUS PRN
Status: DISCONTINUED | OUTPATIENT
Start: 2024-01-24 | End: 2024-01-26 | Stop reason: HOSPADM

## 2024-01-24 RX ORDER — SODIUM CHLORIDE 0.9 % (FLUSH) 0.9 %
5-40 SYRINGE (ML) INJECTION EVERY 12 HOURS SCHEDULED
Status: DISCONTINUED | OUTPATIENT
Start: 2024-01-24 | End: 2024-01-26 | Stop reason: HOSPADM

## 2024-01-24 RX ORDER — ONDANSETRON 4 MG/1
4 TABLET, ORALLY DISINTEGRATING ORAL EVERY 8 HOURS PRN
Status: DISCONTINUED | OUTPATIENT
Start: 2024-01-24 | End: 2024-01-26 | Stop reason: HOSPADM

## 2024-01-24 RX ADMIN — SODIUM CHLORIDE: 9 INJECTION, SOLUTION INTRAVENOUS at 23:05

## 2024-01-24 RX ADMIN — PIPERACILLIN AND TAZOBACTAM 4500 MG: 4; .5 INJECTION, POWDER, LYOPHILIZED, FOR SOLUTION INTRAVENOUS; PARENTERAL at 20:29

## 2024-01-24 RX ADMIN — ACETAMINOPHEN 650 MG: 325 TABLET ORAL at 22:11

## 2024-01-24 RX ADMIN — SODIUM CHLORIDE, PRESERVATIVE FREE 10 ML: 5 INJECTION INTRAVENOUS at 23:04

## 2024-01-24 RX ADMIN — IOPAMIDOL 75 ML: 755 INJECTION, SOLUTION INTRAVENOUS at 16:05

## 2024-01-24 RX ADMIN — ALUMINUM HYDROXIDE, MAGNESIUM HYDROXIDE, AND SIMETHICONE 30 ML: 1200; 120; 1200 SUSPENSION ORAL at 15:58

## 2024-01-24 RX ADMIN — SODIUM CHLORIDE 1000 ML: 9 INJECTION, SOLUTION INTRAVENOUS at 15:59

## 2024-01-24 RX ADMIN — HYDROCODONE BITARTRATE AND ACETAMINOPHEN 1 TABLET: 5; 325 TABLET ORAL at 23:03

## 2024-01-24 ASSESSMENT — ENCOUNTER SYMPTOMS
VOMITING: 0
SHORTNESS OF BREATH: 0
NAUSEA: 0

## 2024-01-24 ASSESSMENT — PAIN DESCRIPTION - ORIENTATION: ORIENTATION: RIGHT

## 2024-01-24 ASSESSMENT — PAIN DESCRIPTION - LOCATION
LOCATION: ABDOMEN
LOCATION: ABDOMEN

## 2024-01-24 ASSESSMENT — LIFESTYLE VARIABLES
HOW MANY STANDARD DRINKS CONTAINING ALCOHOL DO YOU HAVE ON A TYPICAL DAY: 3 OR 4
HOW OFTEN DO YOU HAVE A DRINK CONTAINING ALCOHOL: 2-3 TIMES A WEEK

## 2024-01-24 ASSESSMENT — PAIN - FUNCTIONAL ASSESSMENT: PAIN_FUNCTIONAL_ASSESSMENT: 0-10

## 2024-01-24 ASSESSMENT — PAIN SCALES - GENERAL
PAINLEVEL_OUTOF10: 8
PAINLEVEL_OUTOF10: 6
PAINLEVEL_OUTOF10: 7
PAINLEVEL_OUTOF10: 9

## 2024-01-24 ASSESSMENT — PAIN DESCRIPTION - DESCRIPTORS: DESCRIPTORS: ACHING

## 2024-01-24 NOTE — ED PROVIDER NOTES
[facility] ED  Emergency Department  Faculty Sign-Out Addendum     Care of Bernard Hollingsworth was assumed from previous attending and is being seen for Abdominal Pain (X1 week)  .  The patient's initial evaluation and plan have been discussed with the prior provider who initially evaluated the patient.    Handoff taken on the following patient from prior Attending Physician:    Attestation    I was available and discussed any additional care issues that arose and coordinated the management plans with the resident(s) caring for the patient during my duty period. Any areas of disagreement with resident’s documentation of care or procedures are noted on the chart. I was personally present for the key portions of any/all procedures during my duty period. I have documented in the chart those procedures where I was not present during the key portions.      EMERGENCY DEPARTMENT COURSE / MEDICAL DECISION MAKING:       MEDICATIONS GIVEN:  Orders Placed This Encounter   Medications    aluminum & magnesium hydroxide-simethicone (MAALOX) 200-200-20 MG/5ML suspension 30 mL    sodium chloride 0.9 % bolus 1,000 mL    iopamidol (ISOVUE-370) 76 % injection 75 mL       LABS / RADIOLOGY:     Labs Reviewed   CBC WITH AUTO DIFFERENTIAL - Abnormal; Notable for the following components:       Result Value    WBC 11.8 (*)     Immature Granulocytes 1 (*)     Monocytes % 14 (*)     Monocytes Absolute 1.65 (*)     All other components within normal limits   COMPREHENSIVE METABOLIC PANEL - Abnormal; Notable for the following components:    BUN 27 (*)     Creatinine 1.4 (*)     Est, Glom Filt Rate 55 (*)     Total Protein 8.6 (*)     All other components within normal limits   LIPASE   TROPONIN   TROPONIN   PROTIME-INR   APTT       XR CHEST (2 VW)    Result Date: 1/24/2024  EXAMINATION: TWO XRAY VIEWS OF THE CHEST 1/24/2024 3:30 pm COMPARISON: November 1, 2021 CT chest HISTORY: ORDERING SYSTEM PROVIDED HISTORY: pain

## 2024-01-24 NOTE — ED PROVIDER NOTES
[facility] ED  Emergency Department Encounter  Emergency Medicine Resident     Pt Name:Bernard Hollingsworth  MRN: [id number]  Birthdate 1955  Date of evaluation: 1/24/24  PCP:  John Hameed MD  Note Started: 4:31 PM EST      CHIEF COMPLAINT       Chief Complaint   Patient presents with    Abdominal Pain     X1 week       HISTORY OF PRESENT ILLNESS  (Location/Symptom, Timing/Onset, Context/Setting, Quality, Duration, Modifying Factors, Severity.)      Bernard Hollignsworth is a 69 y.o. male with past medical history of chronic hepatitis C without ascites and alcohol abuse who presents with epigastric abdominal pain that has been ongoing for the last 4 weeks.  Patient explains that the pain worsens at night when he lays flat.  He describes it as a aching and fullness feeling.  He reports feeling bloated.  He reports having early satiety.  Decreased appetite secondary to early satiety.  No nausea or vomiting.  Last bowel movement was this morning.  No blood in the bowel movement.  No fever, chills, recent cold-like symptoms.  Patient denies ever having a pain like this in the past.  States that he previously used to drink 1 to 2 cans of beer per day but has not had alcohol in the last 1.5 weeks.    PAST MEDICAL / SURGICAL / SOCIAL / FAMILY HISTORY      has a past medical history of Alcohol abuse, Chronic hepatitis C without hepatic coma (HCC), COPD, severity to be determined (HCC), Headache, Hypokalemia, and Uncontrolled stage 2 hypertension.       has a past surgical history that includes Neck surgery (about 30 years ago).      Social History     Socioeconomic History    Marital status: Single     Spouse name: Not on file    Number of children: Not on file    Years of education: Not on file    Highest education level: Not on file   Occupational History    Not on file   Tobacco Use    Smoking status: Every Day     Current packs/day: 0.75     Average packs/day: 0.8 packs/day for 46.9 years (35.2 ttl  area.   Musculoskeletal:         General: Normal range of motion.      Cervical back: Normal range of motion.   Skin:     General: Skin is warm and dry.   Neurological:      General: No focal deficit present.      Mental Status: He is alert. Mental status is at baseline.   Psychiatric:         Mood and Affect: Mood normal.         Behavior: Behavior normal.       DDX/DIAGNOSTIC RESULTS / EMERGENCY DEPARTMENT COURSE / MDM     Medical Decision Making  Patient is a 69-year-old male with past medical history of hepatitis C without ascites who presents with epigastric pain that has been ongoing for the last 4 weeks associated with early satiety.  On physical exam, there is no obvious fluid wave.  Abdomen is tender in the epigastric region.  Differential diagnosis includes but is not limited to pancreatitis, pancreatic mass, hepatic mass, GERD, ACS, among other etiologies.    Amount and/or Complexity of Data Reviewed  Labs: ordered. Decision-making details documented in ED Course.  Radiology: ordered.  ECG/medicine tests: ordered.    Risk  OTC drugs.  Prescription drug management.      EMERGENCY DEPARTMENT COURSE:    ED Course as of 01/24/24 1850 Wed Jan 24, 2024   1535 EKG Interpretation   Interpreted by Alf Fofana DO    Rhythm: normal sinus   Rate: normal  Axis: normal  Ectopy: none  Conduction: normal  ST Segments: normal  T Waves: normal  Q Waves: none    Clinical Impression: no acute changes normal EKG     [WK]   1635 WBC(!): 11.8 [JR]   1635 Creatinine(!): 1.4  Given 1 L normal saline for increased creatinine at 1.4. [JR]   1635 Chest x-ray showed no acute cardiopulmonary abnormality. [JR]      ED Course User Index  [JR] Shara Baker MD  [WK] Alf Fofana DO     CT of the abdomen and pelvis with IV contrast showed cholelithiasis with thickening of the gallbladder wall suspicious for acute cholecystitis.  Mild right hydronephrosis and hydroureter without evidence of obstructing stone.  Mild fat-containing

## 2024-01-24 NOTE — ED PROVIDER NOTES
[facility]     Emergency Department     Faculty Note/ Attestation      Pt Name: Bernard Hollingsworth                                       MRN: [id number]  Birthdate 1955  Date of evaluation: 1/24/2024  Note Started: 2:27 PM EST    Patients PCP:    John Hameed MD    Attestation  I performed a history and physical examination of the patient and discussed management with the resident. I reviewed the resident’s note and agree with the documented findings and plan of care. Any areas of disagreement are noted on the chart. I was personally present for the key portions of any procedures. I have documented in the chart those procedures where I was not present during the key portions. I have reviewed the emergency nurses triage note. I agree with the chief complaint, past medical history, past surgical history, allergies, medications, social and family history as documented unless otherwise noted below.    For Physician Assistant/ Nurse Practitioner cases/documentation I have personally evaluated this patient and have completed at least one if not all key elements of the E/M (history, physical exam, and MDM). Additional findings are as noted.    Initial Screens:        [place] Coma Scale  Eye Opening: Spontaneous  Best Verbal Response: Oriented  Best Motor Response: Obeys commands  [place] Coma Scale Score: 15    Vitals:    Vitals:    01/24/24 1352   BP: 105/70   Pulse: 99   Resp: 18   Temp: 97.7 °F (36.5 °C)   SpO2: 99%   Weight: 81.3 kg (179 lb 3.7 oz)       CHIEF COMPLAINT       Chief Complaint   Patient presents with    Abdominal Pain     X1 week       The pt is a 68 YO with cirrosis EtOH and COPD.  The pt has 4 weeks of abdominal pain in the epigastric region with fullness sensation and decreased appetite due to feeling full.  The pt not nauseated not vomiting.        EMERGENCY DEPARTMENT COURSE:     -------------------------  BP: 105/70, Temp: 97.7 °F (36.5 °C), Pulse: 99, Respirations:  18  Physical Exam      Comments  Medical Decision Making  Amount and/or Complexity of Data Reviewed  Labs: ordered.  Radiology: ordered.  ECG/medicine tests: ordered.    Risk  OTC drugs.  Prescription drug management.          ED Course as of 01/24/24 1601   Wed Jan 24, 2024   1535 EKG Interpretation   Interpreted by Alf Fofana DO    Rhythm: normal sinus   Rate: normal  Axis: normal  Ectopy: none  Conduction: normal  ST Segments: normal  T Waves: normal  Q Waves: none    Clinical Impression: no acute changes normal EKG     [WK]      ED Course User Index  [WK] Alf Fofana DO William Krebs, DO, RDMS.  Attending Emergency Physician          Alf Fofana DO  01/24/24 1601

## 2024-01-24 NOTE — ED PROVIDER NOTES
[facility] ED  Emergency Department  Emergency Medicine Resident Sign-out     Care of Bernard Hollingsworth was assumed from Dr. Baker and is being seen for Abdominal Pain (X1 week)  .  The patient's initial evaluation and plan have been discussed with the prior provider who initially evaluated the patient.     EMERGENCY DEPARTMENT COURSE / MEDICAL DECISION MAKING:       MEDICATIONS GIVEN:  Orders Placed This Encounter   Medications    aluminum & magnesium hydroxide-simethicone (MAALOX) 200-200-20 MG/5ML suspension 30 mL    sodium chloride 0.9 % bolus 1,000 mL    iopamidol (ISOVUE-370) 76 % injection 75 mL    FOLLOWED BY Linked Order Group     piperacillin-tazobactam (ZOSYN) 4,500 mg in sodium chloride 0.9 % 100 mL IVPB (mini-bag)      Order Specific Question:   Antimicrobial Indications      Answer:   Intra-Abdominal Infection     piperacillin-tazobactam (ZOSYN) 3,375 mg in sodium chloride 0.9 % 50 mL IVPB (mini-bag)      Order Specific Question:   Antimicrobial Indications      Answer:   Intra-Abdominal Infection       LABS / RADIOLOGY:     Labs Reviewed   CBC WITH AUTO DIFFERENTIAL - Abnormal; Notable for the following components:       Result Value    WBC 11.8 (*)     Immature Granulocytes 1 (*)     Monocytes % 14 (*)     Monocytes Absolute 1.65 (*)     All other components within normal limits   COMPREHENSIVE METABOLIC PANEL - Abnormal; Notable for the following components:    BUN 27 (*)     Creatinine 1.4 (*)     Est, Glom Filt Rate 55 (*)     Total Protein 8.6 (*)     All other components within normal limits   LIPASE   TROPONIN   TROPONIN   PROTIME-INR   APTT   TROPONIN       US GALLBLADDER RUQ    Result Date: 1/24/2024  EXAMINATION: RIGHT UPPER QUADRANT ULTRASOUND 1/24/2024 4:28 pm COMPARISON: CT abdomen and pelvis performed today, abdomen ultrasound 04/14/2022 HISTORY: ORDERING SYSTEM PROVIDED HISTORY: acute cholecystitis TECHNOLOGIST PROVIDED HISTORY: acute cholecystitis FINDINGS: LIVER:   Awaiting their recommendations.  He is hemodynamically stable with normal vitals and is afebrile.  He did receive Maalox here in the emergency department which improved his pain.  He is currently resting comfortably.    Of note, he presented with right upper quadrant/epigastric pain so troponin levels were performed.  Troponin 6 then 9 then 9 with a normal EKG.    ED Course as of 01/24/24 2116 Wed Jan 24, 2024   1535 EKG Interpretation   Interpreted by Alf Fofana DO    Rhythm: normal sinus   Rate: normal  Axis: normal  Ectopy: none  Conduction: normal  ST Segments: normal  T Waves: normal  Q Waves: none    Clinical Impression: no acute changes normal EKG     [WK]   1635 WBC(!): 11.8 [JR]   1635 Creatinine(!): 1.4  Given 1 L normal saline for increased creatinine at 1.4. [JR]   1635 Chest x-ray showed no acute cardiopulmonary abnormality. [JR]   1859 Assumed care from co-resident [GC]   1910  GALLBLADDER RUQ  IMPRESSION:  1. Cholelithiasis and gallbladder sludge with borderline gallbladder wall  thickening and questionable trace pericholecystic fluid. Negative sonographic  Puckett's sign.  Findings are concerning for possible acute cholecystitis.  2. Coarsened echotexture of the liver suggesting underlying liver disease.  Correlate with liver function tests. [GC]   2025 Discussed case with general surgery who is recommending [facility] admit the patient given known cirrhosis, hepatitis C, COPD, and HTN.  Probable OR tomorrow.  Zosyn ordered [GC]   2103 [facility] agreed to admit patient [GC]      ED Course User Index  [GC] Guillermo Smith DO  [JR] Shara Baker MD  [WK] Alf Fofana DO       OUTSTANDING TASKS / RECOMMENDATIONS:    General surgery recs     FINAL IMPRESSION:     1. Acute cholecystitis        DISPOSITION:         DISPOSITION:  []  Discharge   []  Transfer -    [x]  Admission -  [facility]   []  Against Medical Advice   []  Eloped   FOLLOW-UP: No follow-up provider specified.   DISCHARGE MEDICATIONS:  New Prescriptions    No medications on file          Guillermo Smith DO  Emergency Medicine Resident  [facility]

## 2024-01-24 NOTE — ED TRIAGE NOTES
Pt arrived to ED 30 via triage.   Pt co abdominal pain.   Pt states that it has been going on x 1 week.   Pt denies any vomiting or diarrhea.   Pt denies any CP or SOB .   Pt is resting on stretcher with call light within reach.  Breathing is non labored and no acute distress is noted.   Will continue to follow plan of care

## 2024-01-24 NOTE — ED NOTES
The following labs were labeled with appropriate pt sticker and tubed to lab:     [x] Green/yellow

## 2024-01-25 ENCOUNTER — ANESTHESIA (OUTPATIENT)
Dept: OPERATING ROOM | Age: 69
End: 2024-01-25
Payer: MEDICARE

## 2024-01-25 ENCOUNTER — ANESTHESIA EVENT (OUTPATIENT)
Dept: OPERATING ROOM | Age: 69
End: 2024-01-25
Payer: MEDICARE

## 2024-01-25 PROBLEM — B19.20 HEPATITIS C VIRUS INFECTION WITHOUT HEPATIC COMA: Status: ACTIVE | Noted: 2019-03-27

## 2024-01-25 PROBLEM — K74.60 CIRRHOSIS OF LIVER WITHOUT ASCITES (HCC): Status: ACTIVE | Noted: 2024-01-25

## 2024-01-25 LAB
AFP SERPL-MCNC: 5.6 UG/L
ALBUMIN SERPL-MCNC: 3.4 G/DL (ref 3.5–5.2)
ALBUMIN/GLOB SERPL: 1 {RATIO} (ref 1–2.5)
ALP SERPL-CCNC: 73 U/L (ref 40–129)
ALT SERPL-CCNC: 23 U/L (ref 5–41)
AMPHET UR QL SCN: NEGATIVE
ANION GAP SERPL CALCULATED.3IONS-SCNC: 8 MMOL/L (ref 9–17)
AST SERPL-CCNC: 24 U/L
BACTERIA URNS QL MICRO: ABNORMAL
BARBITURATES UR QL SCN: NEGATIVE
BASOPHILS # BLD: 0 K/UL (ref 0–0.2)
BASOPHILS NFR BLD: 0 % (ref 0–2)
BENZODIAZ UR QL: NEGATIVE
BILIRUB SERPL-MCNC: 0.5 MG/DL (ref 0.3–1.2)
BILIRUB UR QL STRIP: NEGATIVE
BUN SERPL-MCNC: 26 MG/DL (ref 8–23)
CALCIUM SERPL-MCNC: 8.3 MG/DL (ref 8.6–10.4)
CANNABINOIDS UR QL SCN: NEGATIVE
CASTS #/AREA URNS LPF: ABNORMAL /LPF (ref 0–2)
CHLORIDE SERPL-SCNC: 108 MMOL/L (ref 98–107)
CLARITY UR: CLEAR
CO2 SERPL-SCNC: 21 MMOL/L (ref 20–31)
COCAINE UR QL SCN: NEGATIVE
COLOR UR: YELLOW
CREAT SERPL-MCNC: 1.3 MG/DL (ref 0.7–1.2)
EOSINOPHIL # BLD: 0.31 K/UL (ref 0–0.4)
EOSINOPHILS RELATIVE PERCENT: 3 % (ref 1–4)
EPI CELLS #/AREA URNS HPF: ABNORMAL /HPF (ref 0–5)
ERYTHROCYTE [DISTWIDTH] IN BLOOD BY AUTOMATED COUNT: 11.9 % (ref 11.8–14.4)
FENTANYL UR QL: NEGATIVE
GFR SERPL CREATININE-BSD FRML MDRD: 60 ML/MIN/1.73M2
GLUCOSE BLD-MCNC: 102 MG/DL (ref 75–110)
GLUCOSE SERPL-MCNC: 84 MG/DL (ref 70–99)
GLUCOSE UR STRIP-MCNC: NEGATIVE MG/DL
HCT VFR BLD AUTO: 37.2 % (ref 40.7–50.3)
HGB BLD-MCNC: 12 G/DL (ref 13–17)
HGB UR QL STRIP.AUTO: NEGATIVE
IMM GRANULOCYTES # BLD AUTO: 0 K/UL (ref 0–0.3)
IMM GRANULOCYTES NFR BLD: 0 %
INR PPP: 1.1
KETONES UR STRIP-MCNC: NEGATIVE MG/DL
LEUKOCYTE ESTERASE UR QL STRIP: ABNORMAL
LYMPHOCYTES NFR BLD: 4.59 K/UL (ref 1–4.8)
LYMPHOCYTES RELATIVE PERCENT: 45 % (ref 24–44)
MCH RBC QN AUTO: 30.5 PG (ref 25.2–33.5)
MCHC RBC AUTO-ENTMCNC: 32.3 G/DL (ref 28.4–34.8)
MCV RBC AUTO: 94.7 FL (ref 82.6–102.9)
METHADONE UR QL: NEGATIVE
MONOCYTES NFR BLD: 1.63 K/UL (ref 0.1–0.8)
MONOCYTES NFR BLD: 16 % (ref 1–7)
MORPHOLOGY: NORMAL
NEUTROPHILS NFR BLD: 36 % (ref 36–66)
NEUTS SEG NFR BLD: 3.67 K/UL (ref 1.8–7.7)
NITRITE UR QL STRIP: NEGATIVE
NRBC BLD-RTO: 0 PER 100 WBC
OPIATES UR QL SCN: NEGATIVE
OXYCODONE UR QL SCN: NEGATIVE
PCP UR QL SCN: NEGATIVE
PH UR STRIP: 6 [PH] (ref 5–8)
PLATELET # BLD AUTO: 171 K/UL (ref 138–453)
PMV BLD AUTO: 9.9 FL (ref 8.1–13.5)
POTASSIUM SERPL-SCNC: 4.1 MMOL/L (ref 3.7–5.3)
PROT SERPL-MCNC: 6.7 G/DL (ref 6.4–8.3)
PROT UR STRIP-MCNC: NEGATIVE MG/DL
PROTHROMBIN TIME: 13.8 SEC (ref 11.7–14.9)
RBC # BLD AUTO: 3.93 M/UL (ref 4.21–5.77)
RBC #/AREA URNS HPF: ABNORMAL /HPF (ref 0–2)
SODIUM SERPL-SCNC: 137 MMOL/L (ref 135–144)
SP GR UR STRIP: 1.04 (ref 1–1.03)
TEST INFORMATION: NORMAL
UROBILINOGEN UR STRIP-ACNC: NORMAL EU/DL (ref 0–1)
WBC #/AREA URNS HPF: ABNORMAL /HPF (ref 0–5)
WBC OTHER # BLD: 10.2 K/UL (ref 3.5–11.3)

## 2024-01-25 PROCEDURE — 80307 DRUG TEST PRSMV CHEM ANLYZR: CPT

## 2024-01-25 PROCEDURE — 3600000004 HC SURGERY LEVEL 4 BASE: Performed by: SURGERY

## 2024-01-25 PROCEDURE — 96375 TX/PRO/DX INJ NEW DRUG ADDON: CPT

## 2024-01-25 PROCEDURE — 2580000003 HC RX 258: Performed by: STUDENT IN AN ORGANIZED HEALTH CARE EDUCATION/TRAINING PROGRAM

## 2024-01-25 PROCEDURE — 6370000000 HC RX 637 (ALT 250 FOR IP): Performed by: STUDENT IN AN ORGANIZED HEALTH CARE EDUCATION/TRAINING PROGRAM

## 2024-01-25 PROCEDURE — 2580000003 HC RX 258: Performed by: SURGERY

## 2024-01-25 PROCEDURE — 2709999900 HC NON-CHARGEABLE SUPPLY: Performed by: SURGERY

## 2024-01-25 PROCEDURE — 88304 TISSUE EXAM BY PATHOLOGIST: CPT

## 2024-01-25 PROCEDURE — 3700000000 HC ANESTHESIA ATTENDED CARE: Performed by: SURGERY

## 2024-01-25 PROCEDURE — 2580000003 HC RX 258: Performed by: INTERNAL MEDICINE

## 2024-01-25 PROCEDURE — 1200000000 HC SEMI PRIVATE

## 2024-01-25 PROCEDURE — 6360000002 HC RX W HCPCS: Performed by: SURGERY

## 2024-01-25 PROCEDURE — 7100000000 HC PACU RECOVERY - FIRST 15 MIN: Performed by: SURGERY

## 2024-01-25 PROCEDURE — C9113 INJ PANTOPRAZOLE SODIUM, VIA: HCPCS | Performed by: INTERNAL MEDICINE

## 2024-01-25 PROCEDURE — 6360000002 HC RX W HCPCS

## 2024-01-25 PROCEDURE — 6360000002 HC RX W HCPCS: Performed by: STUDENT IN AN ORGANIZED HEALTH CARE EDUCATION/TRAINING PROGRAM

## 2024-01-25 PROCEDURE — 3700000001 HC ADD 15 MINUTES (ANESTHESIA): Performed by: SURGERY

## 2024-01-25 PROCEDURE — 2580000003 HC RX 258: Performed by: ANESTHESIOLOGY

## 2024-01-25 PROCEDURE — 2580000003 HC RX 258

## 2024-01-25 PROCEDURE — 2500000003 HC RX 250 WO HCPCS

## 2024-01-25 PROCEDURE — 6370000000 HC RX 637 (ALT 250 FOR IP): Performed by: NURSE PRACTITIONER

## 2024-01-25 PROCEDURE — 6360000002 HC RX W HCPCS: Performed by: INTERNAL MEDICINE

## 2024-01-25 PROCEDURE — 82947 ASSAY GLUCOSE BLOOD QUANT: CPT

## 2024-01-25 PROCEDURE — 0FT44ZZ RESECTION OF GALLBLADDER, PERCUTANEOUS ENDOSCOPIC APPROACH: ICD-10-PCS | Performed by: SURGERY

## 2024-01-25 PROCEDURE — 3600000014 HC SURGERY LEVEL 4 ADDTL 15MIN: Performed by: SURGERY

## 2024-01-25 PROCEDURE — 85025 COMPLETE CBC W/AUTO DIFF WBC: CPT

## 2024-01-25 PROCEDURE — 96372 THER/PROPH/DIAG INJ SC/IM: CPT

## 2024-01-25 PROCEDURE — G0378 HOSPITAL OBSERVATION PER HR: HCPCS

## 2024-01-25 PROCEDURE — A4216 STERILE WATER/SALINE, 10 ML: HCPCS | Performed by: STUDENT IN AN ORGANIZED HEALTH CARE EDUCATION/TRAINING PROGRAM

## 2024-01-25 PROCEDURE — 36415 COLL VENOUS BLD VENIPUNCTURE: CPT

## 2024-01-25 PROCEDURE — 80053 COMPREHEN METABOLIC PANEL: CPT

## 2024-01-25 PROCEDURE — 7100000001 HC PACU RECOVERY - ADDTL 15 MIN: Performed by: SURGERY

## 2024-01-25 PROCEDURE — 47562 LAPAROSCOPIC CHOLECYSTECTOMY: CPT | Performed by: SURGERY

## 2024-01-25 PROCEDURE — C9113 INJ PANTOPRAZOLE SODIUM, VIA: HCPCS | Performed by: STUDENT IN AN ORGANIZED HEALTH CARE EDUCATION/TRAINING PROGRAM

## 2024-01-25 PROCEDURE — 94640 AIRWAY INHALATION TREATMENT: CPT

## 2024-01-25 PROCEDURE — 99223 1ST HOSP IP/OBS HIGH 75: CPT | Performed by: INTERNAL MEDICINE

## 2024-01-25 PROCEDURE — 99232 SBSQ HOSP IP/OBS MODERATE 35: CPT | Performed by: SURGERY

## 2024-01-25 PROCEDURE — 99222 1ST HOSP IP/OBS MODERATE 55: CPT | Performed by: INTERNAL MEDICINE

## 2024-01-25 PROCEDURE — 82105 ALPHA-FETOPROTEIN SERUM: CPT

## 2024-01-25 PROCEDURE — 85610 PROTHROMBIN TIME: CPT

## 2024-01-25 PROCEDURE — 6370000000 HC RX 637 (ALT 250 FOR IP): Performed by: INTERNAL MEDICINE

## 2024-01-25 RX ORDER — FENTANYL CITRATE 50 UG/ML
50 INJECTION, SOLUTION INTRAMUSCULAR; INTRAVENOUS
Status: DISCONTINUED | OUTPATIENT
Start: 2024-01-25 | End: 2024-01-26 | Stop reason: HOSPADM

## 2024-01-25 RX ORDER — MAGNESIUM HYDROXIDE 1200 MG/15ML
LIQUID ORAL CONTINUOUS PRN
Status: DISCONTINUED | OUTPATIENT
Start: 2024-01-25 | End: 2024-01-25 | Stop reason: HOSPADM

## 2024-01-25 RX ORDER — ROSUVASTATIN CALCIUM 10 MG/1
10 TABLET, COATED ORAL NIGHTLY
Status: DISCONTINUED | OUTPATIENT
Start: 2024-01-25 | End: 2024-01-26 | Stop reason: HOSPADM

## 2024-01-25 RX ORDER — OXYCODONE HYDROCHLORIDE 5 MG/1
5 TABLET ORAL EVERY 6 HOURS PRN
Status: DISCONTINUED | OUTPATIENT
Start: 2024-01-25 | End: 2024-01-26 | Stop reason: HOSPADM

## 2024-01-25 RX ORDER — METOCLOPRAMIDE HYDROCHLORIDE 5 MG/ML
10 INJECTION INTRAMUSCULAR; INTRAVENOUS
Status: DISCONTINUED | OUTPATIENT
Start: 2024-01-25 | End: 2024-01-25

## 2024-01-25 RX ORDER — BUPIVACAINE HYDROCHLORIDE 5 MG/ML
INJECTION, SOLUTION EPIDURAL; INTRACAUDAL PRN
Status: DISCONTINUED | OUTPATIENT
Start: 2024-01-25 | End: 2024-01-25 | Stop reason: HOSPADM

## 2024-01-25 RX ORDER — DEXAMETHASONE SODIUM PHOSPHATE 10 MG/ML
INJECTION INTRAMUSCULAR; INTRAVENOUS PRN
Status: DISCONTINUED | OUTPATIENT
Start: 2024-01-25 | End: 2024-01-25 | Stop reason: SDUPTHER

## 2024-01-25 RX ORDER — LIDOCAINE HYDROCHLORIDE 10 MG/ML
INJECTION, SOLUTION EPIDURAL; INFILTRATION; INTRACAUDAL; PERINEURAL PRN
Status: DISCONTINUED | OUTPATIENT
Start: 2024-01-25 | End: 2024-01-25 | Stop reason: SDUPTHER

## 2024-01-25 RX ORDER — PROPOFOL 10 MG/ML
INJECTION, EMULSION INTRAVENOUS PRN
Status: DISCONTINUED | OUTPATIENT
Start: 2024-01-25 | End: 2024-01-25 | Stop reason: SDUPTHER

## 2024-01-25 RX ORDER — SODIUM CHLORIDE 0.9 % (FLUSH) 0.9 %
5-40 SYRINGE (ML) INJECTION EVERY 12 HOURS SCHEDULED
Status: DISCONTINUED | OUTPATIENT
Start: 2024-01-25 | End: 2024-01-25

## 2024-01-25 RX ORDER — PROCHLORPERAZINE EDISYLATE 5 MG/ML
5 INJECTION INTRAMUSCULAR; INTRAVENOUS
Status: DISCONTINUED | OUTPATIENT
Start: 2024-01-25 | End: 2024-01-25

## 2024-01-25 RX ORDER — ONDANSETRON 2 MG/ML
INJECTION INTRAMUSCULAR; INTRAVENOUS PRN
Status: DISCONTINUED | OUTPATIENT
Start: 2024-01-25 | End: 2024-01-25 | Stop reason: SDUPTHER

## 2024-01-25 RX ORDER — SODIUM CHLORIDE, SODIUM LACTATE, POTASSIUM CHLORIDE, CALCIUM CHLORIDE 600; 310; 30; 20 MG/100ML; MG/100ML; MG/100ML; MG/100ML
INJECTION, SOLUTION INTRAVENOUS CONTINUOUS
Status: DISCONTINUED | OUTPATIENT
Start: 2024-01-25 | End: 2024-01-25

## 2024-01-25 RX ORDER — LABETALOL HYDROCHLORIDE 5 MG/ML
10 INJECTION, SOLUTION INTRAVENOUS
Status: DISCONTINUED | OUTPATIENT
Start: 2024-01-25 | End: 2024-01-25

## 2024-01-25 RX ORDER — ROCURONIUM BROMIDE 10 MG/ML
INJECTION, SOLUTION INTRAVENOUS PRN
Status: DISCONTINUED | OUTPATIENT
Start: 2024-01-25 | End: 2024-01-25 | Stop reason: SDUPTHER

## 2024-01-25 RX ORDER — ASPIRIN 81 MG/1
81 TABLET, CHEWABLE ORAL DAILY
Status: DISCONTINUED | OUTPATIENT
Start: 2024-01-26 | End: 2024-01-26 | Stop reason: HOSPADM

## 2024-01-25 RX ORDER — HEPARIN SODIUM 5000 [USP'U]/ML
5000 INJECTION, SOLUTION INTRAVENOUS; SUBCUTANEOUS EVERY 8 HOURS SCHEDULED
Status: DISCONTINUED | OUTPATIENT
Start: 2024-01-25 | End: 2024-01-26 | Stop reason: HOSPADM

## 2024-01-25 RX ORDER — FENTANYL CITRATE 50 UG/ML
INJECTION, SOLUTION INTRAMUSCULAR; INTRAVENOUS PRN
Status: DISCONTINUED | OUTPATIENT
Start: 2024-01-25 | End: 2024-01-25 | Stop reason: SDUPTHER

## 2024-01-25 RX ORDER — SODIUM CHLORIDE, SODIUM LACTATE, POTASSIUM CHLORIDE, CALCIUM CHLORIDE 600; 310; 30; 20 MG/100ML; MG/100ML; MG/100ML; MG/100ML
INJECTION, SOLUTION INTRAVENOUS CONTINUOUS PRN
Status: DISCONTINUED | OUTPATIENT
Start: 2024-01-25 | End: 2024-01-25 | Stop reason: SDUPTHER

## 2024-01-25 RX ORDER — MIDAZOLAM HYDROCHLORIDE 1 MG/ML
INJECTION INTRAMUSCULAR; INTRAVENOUS PRN
Status: DISCONTINUED | OUTPATIENT
Start: 2024-01-25 | End: 2024-01-25 | Stop reason: SDUPTHER

## 2024-01-25 RX ORDER — FENTANYL CITRATE 50 UG/ML
25 INJECTION, SOLUTION INTRAMUSCULAR; INTRAVENOUS EVERY 5 MIN PRN
Status: DISCONTINUED | OUTPATIENT
Start: 2024-01-25 | End: 2024-01-25

## 2024-01-25 RX ORDER — AMLODIPINE BESYLATE 5 MG/1
5 TABLET ORAL DAILY
Status: DISCONTINUED | OUTPATIENT
Start: 2024-01-25 | End: 2024-01-26 | Stop reason: HOSPADM

## 2024-01-25 RX ADMIN — AMLODIPINE BESYLATE 5 MG: 5 TABLET ORAL at 11:44

## 2024-01-25 RX ADMIN — PROPOFOL 150 MG: 10 INJECTION, EMULSION INTRAVENOUS at 13:25

## 2024-01-25 RX ADMIN — MIDAZOLAM 2 MG: 1 INJECTION INTRAMUSCULAR; INTRAVENOUS at 13:20

## 2024-01-25 RX ADMIN — ROCURONIUM BROMIDE 10 MG: 10 SOLUTION INTRAVENOUS at 14:24

## 2024-01-25 RX ADMIN — SUGAMMADEX 200 MG: 100 INJECTION, SOLUTION INTRAVENOUS at 15:15

## 2024-01-25 RX ADMIN — ROCURONIUM BROMIDE 50 MG: 10 SOLUTION INTRAVENOUS at 13:26

## 2024-01-25 RX ADMIN — SODIUM CHLORIDE, PRESERVATIVE FREE 40 MG: 5 INJECTION INTRAVENOUS at 16:45

## 2024-01-25 RX ADMIN — OXYCODONE HYDROCHLORIDE 5 MG: 5 TABLET ORAL at 16:43

## 2024-01-25 RX ADMIN — PIPERACILLIN AND TAZOBACTAM 3375 MG: 3; .375 INJECTION, POWDER, FOR SOLUTION INTRAVENOUS at 03:49

## 2024-01-25 RX ADMIN — ALUMINUM HYDROXIDE, MAGNESIUM HYDROXIDE, AND SIMETHICONE: 1200; 120; 1200 SUSPENSION ORAL at 03:49

## 2024-01-25 RX ADMIN — ROCURONIUM BROMIDE 20 MG: 10 SOLUTION INTRAVENOUS at 13:46

## 2024-01-25 RX ADMIN — SODIUM CHLORIDE, POTASSIUM CHLORIDE, SODIUM LACTATE AND CALCIUM CHLORIDE: 600; 310; 30; 20 INJECTION, SOLUTION INTRAVENOUS at 13:20

## 2024-01-25 RX ADMIN — DEXAMETHASONE SODIUM PHOSPHATE 10 MG: 10 INJECTION INTRAMUSCULAR; INTRAVENOUS at 13:38

## 2024-01-25 RX ADMIN — TIOTROPIUM BROMIDE INHALATION SPRAY 2 PUFF: 3.12 SPRAY, METERED RESPIRATORY (INHALATION) at 08:08

## 2024-01-25 RX ADMIN — ONDANSETRON 4 MG: 2 INJECTION INTRAMUSCULAR; INTRAVENOUS at 15:14

## 2024-01-25 RX ADMIN — FENTANYL CITRATE 50 MCG: 50 INJECTION, SOLUTION INTRAMUSCULAR; INTRAVENOUS at 15:24

## 2024-01-25 RX ADMIN — HEPARIN SODIUM 5000 UNITS: 5000 INJECTION INTRAVENOUS; SUBCUTANEOUS at 08:46

## 2024-01-25 RX ADMIN — LIDOCAINE HYDROCHLORIDE 50 MG: 10 INJECTION, SOLUTION EPIDURAL; INFILTRATION; INTRACAUDAL; PERINEURAL at 13:25

## 2024-01-25 RX ADMIN — ROSUVASTATIN CALCIUM 10 MG: 10 TABLET, COATED ORAL at 21:33

## 2024-01-25 RX ADMIN — PIPERACILLIN AND TAZOBACTAM 3375 MG: 3; .375 INJECTION, POWDER, FOR SOLUTION INTRAVENOUS at 12:50

## 2024-01-25 RX ADMIN — OXYCODONE HYDROCHLORIDE 5 MG: 5 TABLET ORAL at 22:48

## 2024-01-25 RX ADMIN — FENTANYL CITRATE 50 MCG: 50 INJECTION, SOLUTION INTRAMUSCULAR; INTRAVENOUS at 14:13

## 2024-01-25 RX ADMIN — SODIUM CHLORIDE: 9 INJECTION, SOLUTION INTRAVENOUS at 19:11

## 2024-01-25 RX ADMIN — SODIUM CHLORIDE, PRESERVATIVE FREE 40 MG: 5 INJECTION INTRAVENOUS at 03:49

## 2024-01-25 RX ADMIN — SODIUM CHLORIDE, POTASSIUM CHLORIDE, SODIUM LACTATE AND CALCIUM CHLORIDE: 600; 310; 30; 20 INJECTION, SOLUTION INTRAVENOUS at 15:51

## 2024-01-25 RX ADMIN — FENTANYL CITRATE 100 MCG: 50 INJECTION, SOLUTION INTRAMUSCULAR; INTRAVENOUS at 13:25

## 2024-01-25 ASSESSMENT — ENCOUNTER SYMPTOMS
BLOOD IN STOOL: 0
ABDOMINAL PAIN: 1
SHORTNESS OF BREATH: 0
NAUSEA: 0
DIARRHEA: 0
ANAL BLEEDING: 0
CONSTIPATION: 0
ABDOMINAL DISTENTION: 1
VOMITING: 0

## 2024-01-25 ASSESSMENT — PAIN SCALES - GENERAL
PAINLEVEL_OUTOF10: 3
PAINLEVEL_OUTOF10: 8
PAINLEVEL_OUTOF10: 3
PAINLEVEL_OUTOF10: 7
PAINLEVEL_OUTOF10: 5

## 2024-01-25 ASSESSMENT — PAIN DESCRIPTION - DESCRIPTORS: DESCRIPTORS: ACHING

## 2024-01-25 ASSESSMENT — PAIN DESCRIPTION - LOCATION
LOCATION: ABDOMEN

## 2024-01-25 ASSESSMENT — PAIN - FUNCTIONAL ASSESSMENT: PAIN_FUNCTIONAL_ASSESSMENT: 0-10

## 2024-01-25 ASSESSMENT — PAIN DESCRIPTION - PAIN TYPE
TYPE: SURGICAL PAIN

## 2024-01-25 NOTE — PROGRESS NOTES
PROGRESS NOTE          PATIENT NAME: Bernard Hollingsworth  MEDICAL RECORD NO. [id number]  DATE: 1/25/2024    HD: # 1      Patient Active Problem List   Diagnosis    Essential hypertension    Cigarette smoker    Intractable migraine without status migrainosus    Pulmonary emphysema (HCC)    Chronic hepatitis C without hepatic coma (HCC)    Adenomyomatosis of gallbladder    Diastolic dysfunction    Alcoholic cirrhosis of liver without ascites (HCC)    Prediabetes    Alcoholism (HCC)    Acute cholecystitis       DIAGNOSIS AND PLAN    Bernard Hollingsworth 68 yo M with COPD,  liver cirrhosis and hypertension who presents with symptomatic cholelithiasis vs acute cholecystitis.   Plan for Lap cholecystectomy today      Chief Complaint: \"no complaints\"    SUBJECTIVE    No acute events overnight. VSS. He is feeling better and his pain has improved. He denies nausea/vomiting.    OBJECTIVE  VITALS:   Vitals:    01/25/24 0807   BP: (!) 114/93   Pulse: 64   Resp: 18   Temp: 98.1 °F (36.7 °C)   SpO2: 99%     Physical Exam  Constitutional:       Appearance: Normal appearance.   HENT:      Head: Normocephalic and atraumatic.   Cardiovascular:      Rate and Rhythm: Normal rate.   Pulmonary:      Effort: Pulmonary effort is normal.   Abdominal:      General: There is distension.      Palpations: Abdomen is soft.      Tenderness: There is no abdominal tenderness.   Neurological:      Mental Status: He is alert.   Psychiatric:         Mood and Affect: Mood normal.       LAB:  CBC:   Recent Labs     01/24/24  1435 01/25/24  0635   WBC 11.8* 10.2   HGB 14.9 12.0*   HCT 45.2 37.2*   MCV 94.0 94.7    171     BMP:   Recent Labs     01/24/24  1435 01/25/24  0635    137   K 4.1 4.1    108*   CO2 23 21   BUN 27* 26*   CREATININE 1.4* 1.3*   GLUCOSE 99 84       RADIOLOGY:  EXAMINATION:  CT OF THE ABDOMEN AND PELVIS WITH CONTRAST 1/24/2024 4:06 pm     TECHNIQUE:  CT of the abdomen and pelvis was performed with the administration

## 2024-01-25 NOTE — PLAN OF CARE
Problem: Pain  Goal: Verbalizes/displays adequate comfort level or baseline comfort level  Outcome: Progressing     Problem: ABCDS Injury Assessment  Goal: Absence of physical injury  Outcome: Progressing

## 2024-01-25 NOTE — ED NOTES
NP notified patient requesting pain meds for abd pain, 8/10, notified only meds ordered are Tylenol for pain 1-3.

## 2024-01-25 NOTE — ANESTHESIA POSTPROCEDURE EVALUATION
Department of Anesthesiology  Postprocedure Note    Patient: Bernard Hollingsworth  MRN: [id number]  YOB: 1955  Date of evaluation: 1/25/2024    Procedure Summary       Date: 01/25/24 Room / Location: [address]    Anesthesia Start: 1320 Anesthesia Stop: 1526    Procedure: LAPAROSCOPIC CHOLECYSTECTOMY, Diagnosis:       Acute cholecystitis      (Acute cholecystitis [K81.0])    Surgeons: Arturo Aguirre MD Responsible Provider: Joe Lees MD    Anesthesia Type: general ASA Status: 2            Anesthesia Type: No value filed.    Wayne Phase I: Wayne Score: 10    Wayne Phase II:      Anesthesia Post Evaluation    Patient location during evaluation: PACU  Patient participation: complete - patient participated  Level of consciousness: awake and alert  Airway patency: patent  Nausea & Vomiting: no nausea and no vomiting  Cardiovascular status: blood pressure returned to baseline  Respiratory status: acceptable  Hydration status: euvolemic  Comments: No known anesthesia related complication  Multimodal analgesia pain management approach  Pain management: adequate    No notable events documented.

## 2024-01-25 NOTE — ANESTHESIA PRE PROCEDURE
Department of Anesthesiology  Preprocedure Note       Name:  Bernard Hollingsworth   Age:  69 y.o.  :  1955                                          MRN:  [id number]         Date:  2024      Surgeon: Surgeon(s):  Arturo Aguirre MD    Procedure: Procedure(s):  LAPAROSCOPIC CHOLECYSTECTOMY, POSSIBLE OPEN    Medications prior to admission:   Prior to Admission medications    Medication Sig Start Date End Date Taking? Authorizing Provider   albuterol sulfate HFA (PROVENTIL;VENTOLIN;PROAIR) 108 (90 Base) MCG/ACT inhaler INHALE 2 PUFF BY MOUTH EVERY 6 HOURS INTO LUNGS AS NEEDED FOR WHEEZING 23   John Hameed MD   tiotropium (SPIRIVA RESPIMAT) 2.5 MCG/ACT AERS inhaler Inhale 2 puffs into the lungs daily 23   John Hameed MD   rosuvastatin (CRESTOR) 10 MG tablet Take 1 tablet by mouth daily 23   John Hameed MD   aspirin 81 MG EC tablet Take 1 tablet by mouth daily 23   John Hameed MD   hydroCHLOROthiazide (HYDRODIURIL) 25 MG tablet TAKE ONE TABLET BY MOUTH ONCE A DAY 23   John Hameed MD   famotidine (PEPCID) 20 MG tablet TAKE 1 TABLET BY MOUTH 2 TIMES A DAY 23   Serafin May MD   amLODIPine (NORVASC) 10 MG tablet Take 1 tablet by mouth daily 23  Kari Escamilla MD   losartan (COZAAR) 100 MG tablet Take 1 tablet by mouth daily 23   Kari Escamilla MD   simethicone (MYLICON) 80 MG chewable tablet Take 1 tablet by mouth 4 times daily as needed for Flatulence 23   Kari Escamilla MD   Blood Pressure KIT 1 Units by Does not apply route daily 3/8/23   Kathleen Serra MD       Current medications:    Current Facility-Administered Medications   Medication Dose Route Frequency Provider Last Rate Last Admin    pantoprazole (PROTONIX) 40 mg in sodium chloride (PF) 0.9 % 10 mL injection  40 mg IntraVENous Q12H Rachel Oshea MD   40 mg at 24 0349    heparin (porcine) injection 5,000 Units  5,000 Units SubCUTAneous 3

## 2024-01-25 NOTE — ED NOTES
ED to inpatient nurses report      Chief Complaint:  Chief Complaint   Patient presents with    Abdominal Pain     X1 week     Present to ED from: Home    MOA:     LOC: alert and orientated to name, place, date  Mobility: Independent  Oxygen Baseline: NA    Current needs required: NA   Pending ED orders: NA  Present condition: Stable    Why did the patient come to the ED?    Pt arrived to ED 30 via triage.   Pt co abdominal pain.   Pt states that it has been going on x 1 week.   Pt denies any vomiting or diarrhea.   Pt denies any CP or SOB .       What is the plan? Testing indicated acute cholecystitis, awaiting plan from surgery  Any procedures or intervention occur? CT, US, labs  Any safety concerns??    Mental Status:  Level of Consciousness: Alert (0)    Psych Assessment:   Psychosocial  Psychosocial (WDL): Within Defined Limits  Vital signs   Vitals:    01/24/24 1904 01/24/24 1919 01/24/24 1934 01/24/24 1949   BP:  134/75     Pulse:       Resp:       Temp:       SpO2: 98% 97% 97% 98%   Weight:            Vitals:  Patient Vitals for the past 24 hrs:   BP Temp Pulse Resp SpO2 Weight   01/24/24 1949 -- -- -- -- 98 % --   01/24/24 1934 -- -- -- -- 97 % --   01/24/24 1919 134/75 -- -- -- 97 % --   01/24/24 1904 -- -- -- -- 98 % --   01/24/24 1849 -- -- -- -- 98 % --   01/24/24 1834 130/81 -- -- -- 97 % --   01/24/24 1819 131/80 -- -- -- 97 % --   01/24/24 1352 105/70 97.7 °F (36.5 °C) 99 18 99 % 81.3 kg (179 lb 3.7 oz)      Visit Vitals  /75   Pulse 99   Temp 97.7 °F (36.5 °C)   Resp 18   Wt 81.3 kg (179 lb 3.7 oz)   SpO2 98%   BMI 28.07 kg/m²        LDAs:   Peripheral IV 01/24/24 Right Forearm (Active)       Ambulatory Status:  Presents to emergency department  because of falls (Syncope, seizure, or loss of consciousness): No, Age > 70: No, Altered Mental Status, Intoxication with alcohol or substance confusion (Disorientation, impaired judgment, poor safety awaremess, or inability to follow instructions):  ROSUVASTATIN (CRESTOR) 10 MG TABLET    Take 1 tablet by mouth daily    SIMETHICONE (MYLICON) 80 MG CHEWABLE TABLET    Take 1 tablet by mouth 4 times daily as needed for Flatulence    TIOTROPIUM (SPIRIVA RESPIMAT) 2.5 MCG/ACT AERS INHALER    Inhale 2 puffs into the lungs daily     Orders Placed This Encounter   Medications    aluminum & magnesium hydroxide-simethicone (MAALOX) 200-200-20 MG/5ML suspension 30 mL    sodium chloride 0.9 % bolus 1,000 mL    iopamidol (ISOVUE-370) 76 % injection 75 mL       SURGICAL HISTORY       Past Surgical History:   Procedure Laterality Date    NECK SURGERY  about 30 years ago    pt states he had a goiter removed       PAST MEDICAL HISTORY       Past Medical History:   Diagnosis Date    Alcohol abuse 11/3/2016    Chronic hepatitis C without hepatic coma (HCC) 3/27/2019    COPD, severity to be determined (HCC) 8/17/2018    Headache 3/20/2017    Hypokalemia 5/6/2021    Uncontrolled stage 2 hypertension 3/11/2016       Labs:  Labs Reviewed   CBC WITH AUTO DIFFERENTIAL - Abnormal; Notable for the following components:       Result Value    WBC 11.8 (*)     Immature Granulocytes 1 (*)     Monocytes % 14 (*)     Monocytes Absolute 1.65 (*)     All other components within normal limits   COMPREHENSIVE METABOLIC PANEL - Abnormal; Notable for the following components:    BUN 27 (*)     Creatinine 1.4 (*)     Est, Glom Filt Rate 55 (*)     Total Protein 8.6 (*)     All other components within normal limits   LIPASE   TROPONIN   TROPONIN   PROTIME-INR   APTT   TROPONIN       Electronically signed by Cooper Goodwin RN on 1/24/2024 at 8:13 PM

## 2024-01-25 NOTE — OP NOTE
Operative Note      Patient: Bernard Hollingsworth  YOB: 1955  MRN: [id number]    Date of Procedure: 1/25/2024    Pre-Op Diagnosis Codes:     * Acute cholecystitis [K81.0]    Post-Op Diagnosis: Same       Procedure(s):  LAPAROSCOPIC CHOLECYSTECTOMY,    Surgeon(s):  Arturo Aguirre MD    Assistant:   Resident: Paris Zarate MD    Anesthesia: General    Estimated Blood Loss (mL): Minimal    Complications: None    Specimens:   ID Type Source Tests Collected by Time Destination   A : GALLBLADDER AND CONTENTS Tissue Gallbladder SURGICAL PATHOLOGY Arturo Aguirre MD 1/25/2024 1415        Implants:  * No implants in log *      Drains: * No LDAs found *    Findings: hydrops gallbladder, critical view obtained       Detailed Description of Procedure:   Bernard Hollingsworth, 68 yo M with acute cholecystitis who presents for laparoscopic cholecystectomy.  Risks and benefits of the procedure explained the patient detail.  Risk including bleeding, infection, pain, scarring, damage surrounding structures, need for additional procedures were discussed.  He was given the opportunity ask questions.  Written and verbal consent was obtained.      Patient brought to operating theater and placed in supine position.  General anesthesia was induced by the anesthesia care team. A formal timeout was performed verifying patient name, date of birth, any known drug allergies, and indicated procedure. He received zosyn as perioperative antibiotics. The patient was and the patient was prepped and draped in the usual sterile fashion.  Timeout performed.      A 5mm umbilical incision made with 11 blade scalpel.  Umbilical stalk grasped and elevated with penetrating towel clamp, and Veress needle inserted.  After positive saline drop test, the abdomen was insufflated without incident.      A 5mm umbilical optiview port was then inserted, and no inadvertent bowel injury was noted upon entry.  An 11mm subxiphoid port and two 5mm right

## 2024-01-25 NOTE — CONSULTS
gallstones within the gallbladder.  There is borderline gallbladder wall thickening measuring up to 3 mm.  Questionable trace pericholecystic fluid.  Negative sonographic Puckett's sign. Common bile duct is within normal limits measuring 3.7 mm. RIGHT KIDNEY: The right kidney is grossly unremarkable without evidence of hydronephrosis. It measures 10.7 cm. PANCREAS:  Visualized portions of the pancreas are unremarkable. OTHER: No evidence of right upper quadrant ascites.     1. Cholelithiasis and gallbladder sludge with borderline gallbladder wall thickening and questionable trace pericholecystic fluid. Negative sonographic Puckett's sign.  Findings are concerning for possible acute cholecystitis. 2. Coarsened echotexture of the liver suggesting underlying liver disease. Correlate with liver function tests.     CT ABDOMEN PELVIS W IV CONTRAST Additional Contrast? None    Result Date: 1/24/2024  EXAMINATION: CT OF THE ABDOMEN AND PELVIS WITH CONTRAST 1/24/2024 4:06 pm TECHNIQUE: CT of the abdomen and pelvis was performed with the administration of intravenous contrast. Multiplanar reformatted images are provided for review. Automated exposure control, iterative reconstruction, and/or weight based adjustment of the mA/kV was utilized to reduce the radiation dose to as low as reasonably achievable. COMPARISON: None. HISTORY: ORDERING SYSTEM PROVIDED HISTORY: [facility] pain TECHNOLOGIST PROVIDED HISTORY: [facility] pain Decision Support Exception - unselect if not a suspected or confirmed emergency medical condition->Emergency Medical Condition (MA) FINDINGS: Lower Chest: No active disease. Organs: The liver appears unremarkable.  There is evidence of cholelithiasis and thickening of the gallbladder wall suspicious for acute cholecystitis. Pancreas and spleen, adrenals, left kidney, aorta and IVC appear stable.  The right kidney demonstrates mild hydronephrosis and hydroureter however, no evidence of renal stones. GI/Bowel: No evidence  of bowel obstruction or perforation.  Normal appendix. Pelvis: Urinary bladder and UV junctions appear unremarkable.  Prostate and seminal vesicles appear unremarkable.  Bilateral fat containing inguinal hernias.  No pelvic lymphadenopathy. Peritoneum/Retroperitoneum: No evidence of retroperitoneal lymphadenopathy. Mild fat containing periumbilical hernia. Bones/Soft Tissues: L2 superior endplate compression injury.  Acuity indeterminate.     1. Cholelithiasis with thickening of the gallbladder wall suspicious for acute cholecystitis. 2. Mild right hydronephrosis and hydroureter without evidence of obstructing stone. 3. Mild fat containing periumbilical hernia. 4. Bilateral fat containing inguinal hernias. 5. L2 superior endplate compression injury. Acuity indeterminate. RECOMMENDATIONS: Surgical consult for likely acute cholecystitis.     XR CHEST (2 VW)    Result Date: 1/24/2024  EXAMINATION: TWO XRAY VIEWS OF THE CHEST 1/24/2024 3:30 pm COMPARISON: November 1, 2021 CT chest HISTORY: ORDERING SYSTEM PROVIDED HISTORY: pain TECHNOLOGIST PROVIDED HISTORY: pain FINDINGS: Stable cardiomediastinal silhouette.  No pulmonary venous congestion or edema.  Emphysematous changes of the lungs redemonstrated.  No focal lung consolidation or infiltrate.  No pleural effusion or pneumothorax.  Osseous structures grossly intact.     No acute cardiopulmonary process        Thank you for the interesting evaluation. Further recommendations to follow.    Paris Zarate MD  1/24/2024, 9:10 PM         Trauma Attending Attestation      I have reviewed the above [facility] note(s) and confirmed the key elements of the medical history and physical exam. I have seen and examined the pt.  I have discussed the findings, established the care plan and recommendations with Resident.      Serafin Ackerman DO  1/24/2024  10:52 PM

## 2024-01-25 NOTE — PROGRESS NOTES
Occupational Therapy    [facility]  Occupational Therapy Not Seen Note    DATE: 2024    NAME: Bernard Hollingsworth  MRN: [id number]   : 1955      Patient not seen this date for Occupational Therapy due to:    Patient independent with ADLs and functional tasks with no acute OT needs. Observed pt to be independently completing mobility around room. Denies any needs. Will defer OT evaluation at this time. Please reorder OT if future needs arise.       Electronically signed by Vanessa Salguero OT on 2024 at 9:46 AM

## 2024-01-25 NOTE — PROGRESS NOTES
Physical Therapy        Physical Therapy Cancel Note      DATE: 2024    NAME: Bernard Hollingsworth  MRN: [id number]   : 1955      Patient not seen this date for Physical Therapy due to:    Surgery/Procedure: OR for LAPAROSCOPIC CHOLECYSTECTOMY, POSSIBLE OPEN, will check back post-op.       Electronically signed by Noe Caro PT on 2024 at 2:02 PM

## 2024-01-25 NOTE — DISCHARGE INSTRUCTIONS
General Surgery Patient Discharge Instructions  Discharge Date:  1/25/2024    RESUME ACTIVITY:     WOUND CARE:   Skin glue used, do not peel off, allow to fall off naturally.     BATHING:  Ok to shower     DRIVING: No driving while on pain medication    RETURN TO WORK:  As tolerated    WALKING:    Yes    LIFTING: Less than 10 pounds for 4 weeks     DIET:   Ok to resume regular diet.       SPECIAL INSTRUCTION:   Call the office at [phone] if you develop fevers, chills, uncontrolled nausea, vomiting.  Monitor incisions for signs of redness or green discharge. Please follow-up in approximately 2 weeks after surgery for your postop evaluation.      Office Address:  [address] [place] [id number]             Your BP was well controlled with Norvasc while in hospital , will discontinue rest of BP medications, keep record of your BP at home and follow up with PCP regarding same  Follow up with specialists as discussed

## 2024-01-25 NOTE — CONSULTS
Saleem Pantoja, Marcelino, Scott, Denny, Tra, Chey, & Ricardo   Urology H&P      Patient:  Bernard Hollingsworth  MRN: [id number]  YOB: 1955    CHIEF COMPLAINT: Right-sided flank pain    HISTORY OF PRESENT ILLNESS:   The patient is a 69 y.o. male who presents with diffuse abdominal tenderness along with right-sided flank pain for several week duration.  He denies any fevers, chills.  He notes that for the last 3 weeks he has been having undulating abdominal pain associated with eating, radiating to the flank, constant and not relieved by pain medication.  He states that the worsening pain caused him to come to the emergency department.  He denies any prior urological history.  No prior history of nephrolithiasis.  He denies any urine urgency, frequency, sensation of incomplete emptying.  He denies any gross hematuria.  Upon admission, CT abdomen and pelvis done showed right-sided hydronephrosis mild in nature with no significant obstructive uropathy.  Creatinine within normal limits.  Patient states that this morning his pain is much improved.    Patient's old records, notes and chart reviewed and summarized above.    Past Medical History:    Past Medical History:   Diagnosis Date    Alcohol abuse 11/3/2016    Chronic hepatitis C without hepatic coma (HCC) 3/27/2019    COPD, severity to be determined (HCC) 8/17/2018    Headache 3/20/2017    Hypokalemia 5/6/2021    Uncontrolled stage 2 hypertension 3/11/2016       Past Surgical History:    Past Surgical History:   Procedure Laterality Date    NECK SURGERY  about 30 years ago    pt states he had a goiter removed       Medications:      Current Facility-Administered Medications:     pantoprazole (PROTONIX) 40 mg in sodium chloride (PF) 0.9 % 10 mL injection, 40 mg, IntraVENous, Q12H, Rachel Oshea MD, 40 mg at 01/25/24 0349    heparin (porcine) injection 5,000 Units, 5,000 Units, SubCUTAneous, 3 times per day, Shani, Jalen Falk MD, 5,000 Units  1.044*   LEUKOCYTESUR SMALL*   UROBILINOGEN Normal   BILIRUBINUR NEGATIVE           -----------------------------------------------------------------  Imaging Results:  CT ABDOMEN PELVIS W IV CONTRAST Additional Contrast? None    Result Date: 1/25/2024  EXAMINATION: CT OF THE ABDOMEN AND PELVIS WITH CONTRAST 1/24/2024 4:06 pm TECHNIQUE: CT of the abdomen and pelvis was performed with the administration of intravenous contrast. Multiplanar reformatted images are provided for review. Automated exposure control, iterative reconstruction, and/or weight based adjustment of the mA/kV was utilized to reduce the radiation dose to as low as reasonably achievable. COMPARISON: None. HISTORY: ORDERING SYSTEM PROVIDED HISTORY: [facility] pain TECHNOLOGIST PROVIDED HISTORY: [facility] pain Decision Support Exception - unselect if not a suspected or confirmed emergency medical condition->Emergency Medical Condition (MA) FINDINGS: Lower Chest: No active disease. Organs: The liver appears unremarkable.  There is evidence of cholelithiasis and thickening of the gallbladder wall suspicious for acute cholecystitis. Pancreas and spleen, adrenals, left kidney, aorta and IVC appear stable.  The right kidney demonstrates mild hydronephrosis and hydroureter however, no evidence of renal stones. GI/Bowel: No evidence of bowel obstruction or perforation.  Normal appendix. Pelvis: Urinary bladder and UV junctions appear unremarkable.  Prostate and seminal vesicles appear unremarkable.  Bilateral fat containing inguinal hernias.  No pelvic lymphadenopathy. Peritoneum/Retroperitoneum: No evidence of retroperitoneal lymphadenopathy. Mild fat containing periumbilical hernia. Bones/Soft Tissues: L2 superior endplate compression injury.  Acuity indeterminate.     1. Cholelithiasis with thickening of the gallbladder wall suspicious for acute cholecystitis. 2. Mild right hydronephrosis and hydroureter without evidence of obstructing stone. 3. Mild fat containing

## 2024-01-25 NOTE — CONSULTS
[facility] Gastroenterology  Consultation Note     .  Chief Complaint:  Abdominal pain    Reason for consult:    Evaluate for need for endoscopy    History of present illness: This is a 69 y.o. male with PMH including alcohol use disorder, untreated HCV, cirrhosis, gallbladder adenomyomatosis, substance use disorder, smoking, COPD, hypertension.  The patient has been having abdominal pain for 1 week, waxing and waning in nature in the epigastric region.  This has not been associated with any fevers or chills.  The patient has had nausea and poor oral intake associated with this pain.  He has had a follow-up for cirrhosis and untreated HCV in clinic with GI previously, he refused EGD and colonoscopy at that time.  His last colon cancer screening was in 2020 with Cologuard which was negative.  There is also some suspicion for gastroparesis because of postmeal bloating and a nuclear medicine scan was ordered but patient did not get it done.      On this admission the patient was found to have EDY with creatinine of 1.3, LFTs were normal, hemoglobin of 12, platelets 171, INR 1.1.  Ultrasound showed cholecystitis and coarsened liver echotexture, no ascites.  CT showed evidence of cholecystitis, periumbilical and inguinal hernias.  The patient is planned for laparoscopic cholecystectomy per general surgery.  GI was consulted for clarification of need for further endoscopy, gastric emptying study.  The patient does not complain of any nausea, vomiting, constipation, diarrhea, no blood in vomitus, no blood in stool or dark tarry stool.  The patient has no known history of ulcer disease.      Previous GI history:   Alcohol use disorder, untreated hepatitis C, cirrhosis  Gallbladder adenomyomatosis    Past Medical/Social/Family History:  Past Medical History:   Diagnosis Date    Alcohol abuse 11/3/2016    Chronic hepatitis C without hepatic coma (HCC) 3/27/2019    COPD, severity to be determined (HCC) 8/17/2018     ascites  -Patient encouraged to quit alcohol, still drinking  -Patient would benefit from outpatient screening colonoscopy and EGD for varices screening  -Patient would benefit from outpatient evaluation for treatment of hepatitis C if willing  -No urgent need for inpatient endoscopy  -Will check AFP      This plan was formulated in collaboration with Dr. Papito MD  Please feel free to contact us with any questions or concerns    Please await for final attending attestation    [facility] Gastroenterology  Nahun Alegria MD   PGY-1  1/25/2024    10:25 AM

## 2024-01-25 NOTE — CARE COORDINATION
Case Management Assessment  Initial Evaluation    Date/Time of Evaluation: 1/25/2024 11:38 AM  Assessment Completed by: Garima Moya RN    If patient is discharged prior to next notation, then this note serves as note for discharge by case management.    Patient Name: Bernard Hollingsworth                   YOB: 1955  Diagnosis: Acute cholecystitis [K81.0]                   Date / Time: 1/24/2024  2:03 PM    Patient Admission Status: Inpatient   Readmission Risk (Low < 19, Mod (19-27), High > 27): Readmission Risk Score: 8.6    Current PCP: John Hameed MD  PCP verified by CM?      Chart Reviewed: Yes      History Provided by:    Patient Orientation:      Patient Cognition:      Hospitalization in the last 30 days (Readmission):  No    If yes, Readmission Assessment in  Navigator will be completed.    Advance Directives:      Code Status: Full Code   Patient's Primary Decision Maker is:        Discharge Planning:    Patient lives with: Alone Type of Home: Apartment  Primary Care Giver:    Patient Support Systems include: Friends/Neighbors   Current Financial resources:    Current community resources:    Current services prior to admission: None            Current DME:              Type of Home Care services:  None    ADLS  Prior functional level:    Current functional level:      PT AM-PAC:   /24  OT AM-PAC:   /24    Family can provide assistance at DC:    Would you like Case Management to discuss the discharge plan with any other family members/significant others, and if so, who?    Plans to Return to Present Housing:    Other Identified Issues/Barriers to RETURNING to current housing: pending surgery  Potential Assistance needed at discharge: N/A            Potential DME:    Patient expects to discharge to: Apartment  Plan for transportation at discharge:      Financial    Payor: [facility] / Plan: [facility] DUAL / Product Type: *No Product type* /     Potential assistance

## 2024-01-25 NOTE — H&P
recommended for EGD and gastric emptying study.   Denies changes in bowel pattern.  Denies prior surgeries, abdominal surgeries or endoscopy.  Denies prior treatment for hepatitis C .  Denies easy bruising or bleeding issues .  Denies any concern for cirrhosis .  Patient states GI symptoms are similar to prior episodes in  but \"more intense\".  Denies headaches or vertiginous symptoms.  Denies orthostasis.  Denies PND orthopnea.  Denies lower extremity edema, denies prior DVT PE, denies myalgias arthralgias.  Denies history of malignancy.  Denies sleep apnea.  Denies recurrent bronchitis COPD or asthma.  Denies use of inhalers.  Denies falls or injuries .  Denies diabetes or borderline diabetes, denies heat or cold intolerance .  Denies headaches or vertiginous symptoms .  Denies flulike symptoms .  Does have chronic postnasal drip . review of systems otherwise -12 system reviewed and otherwise unremarkable      Physical Exam:   /73   Pulse 81   Temp 98.8 °F (37.1 °C) (Oral)   Resp 16   Ht 1.702 m (5' 7\")   Wt 81.2 kg (179 lb)   SpO2 97%   BMI 28.04 kg/m²   Temp (24hrs), Av.3 °F (36.8 °C), Min:97.7 °F (36.5 °C), Max:98.8 °F (37.1 °C)    No results for input(s): \"POCGLU\" in the last 72 hours.  No intake or output data in the 24 hours ending 24 0024    Physical Exam  General awake alert pleasant sitting up in bed appropriate follows commands  Eye exam pupils equally round reactive sclera nonicteric normal horizontal gaze  ENT oropharynx with moist mucous membranes face symmetric neck supple adequate dentition  Cardiac regular rate and rhythm normal S1-S2 no murmurs rubs or gallops no JVD  Lungs diffuse patchy dry crackles bilaterally that improved with deep breaths, adequate air exchange nonlabored no tachypnea no accessory muscle use no wheezing or rhonchi  GI soft with minimal tenderness to palpation that localizes to the epigastrium, negative Puckett no rebound or guarding, bowel sounds  0.3 - 1.2 mg/dL    Alkaline Phosphatase 96 40 - 129 U/L    ALT 30 5 - 41 U/L    AST 28 <40 U/L   Lipase    Collection Time: 01/24/24  2:35 PM   Result Value Ref Range    Lipase 38 13 - 60 U/L   Troponin    Collection Time: 01/24/24  2:35 PM   Result Value Ref Range    Troponin, High Sensitivity <6 0 - 22 ng/L   Protime-INR    Collection Time: 01/24/24  2:35 PM   Result Value Ref Range    Protime 12.9 11.7 - 14.9 sec    INR 1.0    APTT    Collection Time: 01/24/24  2:35 PM   Result Value Ref Range    PTT 30.0 23.0 - 36.5 sec   Troponin    Collection Time: 01/24/24  3:41 PM   Result Value Ref Range    Troponin, High Sensitivity 9 0 - 22 ng/L   Troponin    Collection Time: 01/24/24  5:55 PM   Result Value Ref Range    Troponin, High Sensitivity 9 0 - 22 ng/L   Urinalysis with Reflex to Culture    Collection Time: 01/24/24 10:13 PM    Specimen: Urine, clean catch   Result Value Ref Range    Color, UA Yellow Yellow    Turbidity UA Clear Clear    Glucose, Ur NEGATIVE NEGATIVE mg/dL    Bilirubin Urine NEGATIVE NEGATIVE    Ketones, Urine NEGATIVE NEGATIVE mg/dL    Specific Gravity, UA 1.044 (H) 1.005 - 1.030    Urine Hgb NEGATIVE NEGATIVE    pH, UA 6.0 5.0 - 8.0    Protein, UA NEGATIVE NEGATIVE mg/dL    Urobilinogen, Urine Normal 0.0 - 1.0 EU/dL    Nitrite, Urine NEGATIVE NEGATIVE    Leukocyte Esterase, Urine SMALL (A) NEGATIVE   Microscopic Urinalysis    Collection Time: 01/24/24 10:13 PM   Result Value Ref Range    WBC, UA 5 TO 10 0 - 5 /HPF    RBC, UA 0 TO 2 0 - 2 /HPF    Casts UA None 0 - 2 /LPF    Epithelial Cells UA 2 TO 5 0 - 5 /HPF    Bacteria, UA MANY (A) None       Imaging/Diagnostics:  US GALLBLADDER RUQ    Result Date: 1/24/2024  1. Cholelithiasis and gallbladder sludge with borderline gallbladder wall thickening and questionable trace pericholecystic fluid. Negative sonographic Puckett's sign.  Findings are concerning for possible acute cholecystitis. 2. Coarsened echotexture of the liver suggesting underlying  issues  Alcohol/HCV associated cirrhosis encourage HCV treatment discussed with patient.  Outpatient follow-up with GI to discuss if patient agreeable.  Counseled on alcohol cessation with concern for worsening cirrhosis discussed with patient with concomitant HCV and ongoing alcohol use.  Tobacco abuse encourage cessation  Polysubstance abuse with history of crack cocaine.  Denies any recent use.       Confronted patient regarding prior noncompliance and prior refusal for further GI evaluation with outpatient evaluation.  Question concerns and treatment plan discussed.  Patient agreeable for further evaluation including consideration for endoscopy if clinically appropriate.      Prior records reviewed independently by myself.  Counseled on lifestyle modifications, tobacco and drug cessation and alcohol cessation discussed in detail.      Anticipate likely discharge in 2 to 3 days contingent on clinical progress    Consultations:   IP CONSULT TO GENERAL SURGERY  IP CONSULT TO HOSPITALIST     Patient is admitted as inpatient status because of co-morbidities listed above, severity of signs and symptoms as outlined, requirement for current medical therapies and most importantly because of direct risk to patient if care not provided in a hospital setting.  Expected length of stay > 48 hours.    Rachel Oshea MD  1/25/2024  12:24 AM    Copy sent to John Carranza MD

## 2024-01-26 VITALS
SYSTOLIC BLOOD PRESSURE: 117 MMHG | TEMPERATURE: 98.2 F | OXYGEN SATURATION: 95 % | HEART RATE: 75 BPM | WEIGHT: 179 LBS | BODY MASS INDEX: 28.09 KG/M2 | DIASTOLIC BLOOD PRESSURE: 70 MMHG | HEIGHT: 67 IN | RESPIRATION RATE: 18 BRPM

## 2024-01-26 LAB
ANION GAP SERPL CALCULATED.3IONS-SCNC: 14 MMOL/L (ref 9–17)
BASOPHILS # BLD: 0 K/UL (ref 0–0.2)
BASOPHILS NFR BLD: 0 % (ref 0–2)
BUN SERPL-MCNC: 20 MG/DL (ref 8–23)
CALCIUM SERPL-MCNC: 8.7 MG/DL (ref 8.6–10.4)
CHLORIDE SERPL-SCNC: 104 MMOL/L (ref 98–107)
CO2 SERPL-SCNC: 17 MMOL/L (ref 20–31)
CREAT SERPL-MCNC: 1.1 MG/DL (ref 0.7–1.2)
EKG ATRIAL RATE: 79 BPM
EKG P AXIS: 42 DEGREES
EKG P-R INTERVAL: 174 MS
EKG Q-T INTERVAL: 402 MS
EKG QRS DURATION: 88 MS
EKG QTC CALCULATION (BAZETT): 460 MS
EKG R AXIS: 14 DEGREES
EKG T AXIS: 49 DEGREES
EKG VENTRICULAR RATE: 79 BPM
EOSINOPHIL # BLD: 0 K/UL (ref 0–0.4)
EOSINOPHILS RELATIVE PERCENT: 0 % (ref 1–4)
ERYTHROCYTE [DISTWIDTH] IN BLOOD BY AUTOMATED COUNT: 11.9 % (ref 11.8–14.4)
GFR SERPL CREATININE-BSD FRML MDRD: >60 ML/MIN/1.73M2
GLUCOSE SERPL-MCNC: 93 MG/DL (ref 70–99)
HBV CORE IGM SERPL QL IA: NONREACTIVE
HBV SURFACE AB SERPL IA-ACNC: <3.5 MIU/ML
HBV SURFACE AG SERPL QL IA: NONREACTIVE
HCT VFR BLD AUTO: 39.5 % (ref 40.7–50.3)
HGB BLD-MCNC: 12.4 G/DL (ref 13–17)
HIV 1+2 AB+HIV1 P24 AG SERPL QL IA: NONREACTIVE
IMM GRANULOCYTES # BLD AUTO: 0 K/UL (ref 0–0.3)
IMM GRANULOCYTES NFR BLD: 0 %
LYMPHOCYTES NFR BLD: 1.56 K/UL (ref 1–4.8)
LYMPHOCYTES RELATIVE PERCENT: 9 % (ref 24–44)
MCH RBC QN AUTO: 31.1 PG (ref 25.2–33.5)
MCHC RBC AUTO-ENTMCNC: 31.4 G/DL (ref 28.4–34.8)
MCV RBC AUTO: 99 FL (ref 82.6–102.9)
MONOCYTES NFR BLD: 0.69 K/UL (ref 0.1–0.8)
MONOCYTES NFR BLD: 4 % (ref 1–7)
MORPHOLOGY: NORMAL
NEUTROPHILS NFR BLD: 87 % (ref 36–66)
NEUTS SEG NFR BLD: 15.05 K/UL (ref 1.8–7.7)
NRBC BLD-RTO: 0 PER 100 WBC
PLATELET # BLD AUTO: 178 K/UL (ref 138–453)
PMV BLD AUTO: 9.6 FL (ref 8.1–13.5)
POTASSIUM SERPL-SCNC: 3.9 MMOL/L (ref 3.7–5.3)
RBC # BLD AUTO: 3.99 M/UL (ref 4.21–5.77)
SODIUM SERPL-SCNC: 135 MMOL/L (ref 135–144)
WBC OTHER # BLD: 17.3 K/UL (ref 3.5–11.3)

## 2024-01-26 PROCEDURE — 96361 HYDRATE IV INFUSION ADD-ON: CPT

## 2024-01-26 PROCEDURE — 6370000000 HC RX 637 (ALT 250 FOR IP): Performed by: STUDENT IN AN ORGANIZED HEALTH CARE EDUCATION/TRAINING PROGRAM

## 2024-01-26 PROCEDURE — 87350 HEPATITIS BE AG IA: CPT

## 2024-01-26 PROCEDURE — 96372 THER/PROPH/DIAG INJ SC/IM: CPT

## 2024-01-26 PROCEDURE — 96376 TX/PRO/DX INJ SAME DRUG ADON: CPT

## 2024-01-26 PROCEDURE — 86704 HEP B CORE ANTIBODY TOTAL: CPT

## 2024-01-26 PROCEDURE — 87522 HEPATITIS C REVRS TRNSCRPJ: CPT

## 2024-01-26 PROCEDURE — 36415 COLL VENOUS BLD VENIPUNCTURE: CPT

## 2024-01-26 PROCEDURE — 86705 HEP B CORE ANTIBODY IGM: CPT

## 2024-01-26 PROCEDURE — 85025 COMPLETE CBC W/AUTO DIFF WBC: CPT

## 2024-01-26 PROCEDURE — 6360000002 HC RX W HCPCS: Performed by: STUDENT IN AN ORGANIZED HEALTH CARE EDUCATION/TRAINING PROGRAM

## 2024-01-26 PROCEDURE — 2580000003 HC RX 258: Performed by: STUDENT IN AN ORGANIZED HEALTH CARE EDUCATION/TRAINING PROGRAM

## 2024-01-26 PROCEDURE — 80048 BASIC METABOLIC PNL TOTAL CA: CPT

## 2024-01-26 PROCEDURE — 99239 HOSP IP/OBS DSCHRG MGMT >30: CPT | Performed by: STUDENT IN AN ORGANIZED HEALTH CARE EDUCATION/TRAINING PROGRAM

## 2024-01-26 PROCEDURE — 86317 IMMUNOASSAY INFECTIOUS AGENT: CPT

## 2024-01-26 PROCEDURE — C9113 INJ PANTOPRAZOLE SODIUM, VIA: HCPCS | Performed by: STUDENT IN AN ORGANIZED HEALTH CARE EDUCATION/TRAINING PROGRAM

## 2024-01-26 PROCEDURE — G0378 HOSPITAL OBSERVATION PER HR: HCPCS

## 2024-01-26 PROCEDURE — 94640 AIRWAY INHALATION TREATMENT: CPT

## 2024-01-26 PROCEDURE — 87389 HIV-1 AG W/HIV-1&-2 AB AG IA: CPT

## 2024-01-26 PROCEDURE — 87340 HEPATITIS B SURFACE AG IA: CPT

## 2024-01-26 RX ORDER — OXYCODONE HYDROCHLORIDE 5 MG/1
5 TABLET ORAL EVERY 6 HOURS PRN
Qty: 12 TABLET | Refills: 0 | Status: SHIPPED | OUTPATIENT
Start: 2024-01-26 | End: 2024-01-31

## 2024-01-26 RX ADMIN — OXYCODONE HYDROCHLORIDE 5 MG: 5 TABLET ORAL at 05:18

## 2024-01-26 RX ADMIN — HEPARIN SODIUM 5000 UNITS: 5000 INJECTION INTRAVENOUS; SUBCUTANEOUS at 05:13

## 2024-01-26 RX ADMIN — ASPIRIN 81 MG: 81 TABLET, CHEWABLE ORAL at 08:20

## 2024-01-26 RX ADMIN — AMLODIPINE BESYLATE 5 MG: 5 TABLET ORAL at 08:21

## 2024-01-26 RX ADMIN — TIOTROPIUM BROMIDE INHALATION SPRAY 2 PUFF: 3.12 SPRAY, METERED RESPIRATORY (INHALATION) at 08:07

## 2024-01-26 RX ADMIN — SODIUM CHLORIDE, PRESERVATIVE FREE 40 MG: 5 INJECTION INTRAVENOUS at 05:13

## 2024-01-26 RX ADMIN — SODIUM CHLORIDE, PRESERVATIVE FREE 10 ML: 5 INJECTION INTRAVENOUS at 08:22

## 2024-01-26 ASSESSMENT — PAIN SCALES - GENERAL: PAINLEVEL_OUTOF10: 7

## 2024-01-26 ASSESSMENT — PAIN DESCRIPTION - DESCRIPTORS: DESCRIPTORS: ACHING

## 2024-01-26 ASSESSMENT — PAIN DESCRIPTION - LOCATION: LOCATION: ABDOMEN

## 2024-01-26 NOTE — DISCHARGE SUMMARY
@[facility] [email or web address]@    [facility]   IN-PATIENT SERVICE   [facility]    Discharge Summary     Patient ID: Bernard Hollingsworth  :  1955   MRN: [id number]     ACCOUNT:  [id number]   Patient's PCP: John Hameed MD  Admit Date: 2024   Discharge Date: 2024   Length of Stay: 2  Code Status:  Full Code  Admitting Physician: Jalen Falk Sra, MD  Discharge Physician: Jalen Falk Sra, MD     Active Discharge Diagnoses:     Hospital Problem Lists:  Principal Problem:    Acute cholecystitis  Active Problems:    Hepatitis C virus infection without hepatic coma    Cirrhosis of liver without ascites (HCC)  Resolved Problems:    * No resolved hospital problems. *      Admission Condition:  stable     Discharged Condition: stable    Hospital Stay:     Hospital Course: 69-year-old male with history of alcohol abuse, hep C, COPD, polysubstance abuse, chronic abdominal pain came in with worsening abdominal pain over 1 to 2 weeks,, nausea, vomiting, poor oral intake, concern for cholecystitis, underwent gallbladder removal by GEN surge, found to have hydrops gallbladder, pending surgical pathology report, was seen by GI service as well for liver cirrhosis, recommended outpatient follow-up, seen by urology service for right-sided hydronephrosis, will repeat ultrasound outpatient in 3 months, depending on same will need cystoscopy retrograde to rule out any extrinsic compression  His blood pressure has been stable throughout hospital course on Norvasc, will discontinue rest of the blood pressure medications, will follow-up with PCP regarding same  EDY improved significantly, will give BMP as outpatient before his visit with PCP  Significant therapeutic interventions: As described in hospital course    Significant Diagnostic Studies:   Labs / Micro:  CBC:   Lab Results   Component Value Date/Time    WBC 17.3 2024 01:21 PM    RBC 3.99 2024 01:21 PM    HGB 12.4 2024 01:21  MD JONATHAN  [address] [place] [address]  [facility] [address]  [phone]    Follow up in 2 week(s)  post op cholecystectomy    Franklin Cobos MD  [address] [facility], [address]ND FLOOR  [facility] [address]  [phone]    Call in 3 week(s)  Call office to schedule out patient follow up for liver disease    John Hameed MD  [facility], [address] [place] [id number] [phone]    Follow up in 1 week(s)         Requiring Further Evaluation/Follow Up POST HOSPITALIZATION/Incidental Findings: As described in radiology section    Diet: regular diet    Activity: As tolerated    Instructions to Patient:     General Surgery Patient Discharge Instructions  Discharge Date:  1/25/2024    RESUME ACTIVITY:     WOUND CARE:   Skin glue used, do not peel off, allow to fall off naturally.     BATHING:  Ok to shower     DRIVING: No driving while on pain medication    RETURN TO WORK:  As tolerated    WALKING:    Yes    LIFTING: Less than 10 pounds for 4 weeks     DIET:   Ok to resume regular diet.       SPECIAL INSTRUCTION:   Call the office at [phone] if you develop fevers, chills, uncontrolled nausea, vomiting.  Monitor incisions for signs of redness or green discharge. Please follow-up in approximately 2 weeks after surgery for your postop evaluation.      Office Address:  [address] [place] [id number]             Your BP was well controlled with Norvasc while in hospital , will discontinue rest of BP medications, keep record of your BP at home and follow up with PCP regarding same  Follow up with specialists as discussed    Discharge Medications:      Medication List        START taking these medications      oxyCODONE 5 MG immediate release tablet  Commonly known as: Roxicodone  Take 1 tablet by mouth every 6 hours as needed for Pain for up to 5 days. Intended supply: 5 days. Take lowest dose possible to manage pain Max Daily Amount: 20 mg            CONTINUE taking these medications      albuterol sulfate HFA

## 2024-01-26 NOTE — DISCHARGE SUMMARY
Discussed with the patient and all questions fully answered. He will call me if any problems arise. Patient wanted to go downstairs to grab his medications, patient walked downstairs to be transported home.

## 2024-01-26 NOTE — PLAN OF CARE
Problem: Pain  Goal: Verbalizes/displays adequate comfort level or baseline comfort level  1/26/2024 1422 by Blanca Alexander RN  Outcome: Completed  1/26/2024 0310 by Emma Wyman RN  Outcome: Progressing     Problem: ABCDS Injury Assessment  Goal: Absence of physical injury  1/26/2024 1422 by Blanca Alexander RN  Outcome: Completed  1/26/2024 0310 by Emma Wyman RN  Outcome: Progressing     Problem: Discharge Planning  Goal: Discharge to home or other facility with appropriate resources  1/26/2024 1422 by Blanca Alexander RN  Outcome: Completed  1/26/2024 0310 by Emma Wyman RN  Outcome: Progressing     Problem: Safety - Adult  Goal: Free from fall injury  1/26/2024 1422 by Blanca Alexander RN  Outcome: Completed  1/26/2024 0310 by Emma Wyman RN  Outcome: Progressing     Problem: Respiratory - Adult  Goal: Achieves optimal ventilation and oxygenation  1/26/2024 1422 by Blanca Alexander RN  Outcome: Completed  1/26/2024 0810 by Mahi Zelaya RCP  Outcome: Progressing

## 2024-01-26 NOTE — PROGRESS NOTES
PROGRESS NOTE          PATIENT NAME: Bernard Hollingsworth  MEDICAL RECORD NO. [id number]  DATE: 2024  SURGEON: Dr. Aguirre  PRIMARY CARE PHYSICIAN: John Hameed MD    HD: # 2    ASSESSMENT    Patient Active Problem List   Diagnosis    Essential hypertension    Cigarette smoker    Intractable migraine without status migrainosus    Pulmonary emphysema (HCC)    Hepatitis C virus infection without hepatic coma    Adenomyomatosis of gallbladder    Diastolic dysfunction    Alcoholic cirrhosis of liver without ascites (HCC)    Prediabetes    Alcoholism (HCC)    Acute cholecystitis    Cirrhosis of liver without ascites (HCC)       MEDICAL DECISION MAKING AND PLAN    Postop day 1 from laparoscopic cholecystectomy, doing well states he is hungry.  Okay to advance to regular diet  Follow-up in clinic in 2 weeks  Surgery will sign off.    Chief Complaint: \"I feel better\"    SUBJECTIVE    Bernard Hollingsworth was seen and evaluated at bedside.  Postop day 1 from laparoscopic cholecystectomy overall doing well pain controlled.       OBJECTIVE  VITALS: Temp: Temp: 98.2 °F (36.8 °C)Temp  Av.7 °F (36.5 °C)  Min: 97.3 °F (36.3 °C)  Max: 98.2 °F (36.8 °C) BP Systolic (24hrs), Av , Min:109 , Max:145   Diastolic (24hrs), Av, Min:65, Max:73   Pulse Pulse  Av  Min: 74  Max: 92 Resp Resp  Av.5  Min: 15  Max: 21 Pulse ox SpO2  Av.3 %  Min: 93 %  Max: 100 %  GENERAL: alert, no distress  NEURO: GCS 15, no acute deficits  HEENT: Normocephalic, atraumatic  : Deferred  LUNGS: Symmetrical rise and fall of chest, normal effort with respiration  HEART: normal rate and regular rhythm  ABDOMEN: Soft, slightly distended, appropriately tender to palpation near incisions.  Surgical incisions clean dry and intact with surgical glue  EXTREMITY: no cyanosis, clubbing or edema    I/O last 3 completed shifts:  In: 1212.8 [I.V.:1112; IV Piggyback:100.8]  Out: -     Drain/tube output:  In: 1212.8 [I.V.:1112]  Out: -     LAB:  CBC:  November 1, 2021 CT chest HISTORY: ORDERING SYSTEM PROVIDED HISTORY: pain TECHNOLOGIST PROVIDED HISTORY: pain FINDINGS: Stable cardiomediastinal silhouette.  No pulmonary venous congestion or edema.  Emphysematous changes of the lungs redemonstrated.  No focal lung consolidation or infiltrate.  No pleural effusion or pneumothorax.  Osseous structures grossly intact.     No acute cardiopulmonary process        Paris Zarate MD  1/26/24, 11:26 AM

## 2024-01-26 NOTE — PLAN OF CARE
Problem: Pain  Goal: Verbalizes/displays adequate comfort level or baseline comfort level  Outcome: Progressing     Problem: ABCDS Injury Assessment  Goal: Absence of physical injury  Outcome: Progressing     Problem: Discharge Planning  Goal: Discharge to home or other facility with appropriate resources  Outcome: Progressing     Problem: Safety - Adult  Goal: Free from fall injury  Outcome: Progressing

## 2024-01-27 LAB
HBV CORE AB SER QL: NONREACTIVE
HBV E AG SERPL QL IA: NEGATIVE

## 2024-01-28 LAB
HCV RNA # SERPL NAA+PROBE: DETECTED {COPIES}/ML
HCV RNA SERPL NAA+PROBE-ACNC: ABNORMAL IU/ML
HCV RNA SERPL NAA+PROBE-LOG IU: 6.73 LOG IU/ML
SPECIMEN SOURCE: ABNORMAL

## 2024-01-29 ENCOUNTER — CARE COORDINATION (OUTPATIENT)
Dept: CASE MANAGEMENT | Age: 69
End: 2024-01-29

## 2024-01-29 LAB
EKG ATRIAL RATE: 79 BPM
EKG P AXIS: 42 DEGREES
EKG P-R INTERVAL: 174 MS
EKG Q-T INTERVAL: 402 MS
EKG QRS DURATION: 88 MS
EKG QTC CALCULATION (BAZETT): 460 MS
EKG R AXIS: 14 DEGREES
EKG T AXIS: 49 DEGREES
EKG VENTRICULAR RATE: 79 BPM
SURGICAL PATHOLOGY REPORT: NORMAL

## 2024-01-29 NOTE — CARE COORDINATION
Care Transitions Outreach Attempt #1  Initial 24 hour call.     Call within 2 business days of discharge: Yes   Attempted to reach patient for transitions of care follow up. Unable to reach patient. HIPAA compliant message left on  requesting a return call.    Patient: Bernard Hollingsworth Patient : 1955 MRN: [id number]    Last Discharge Facility       Date Complaint Diagnosis Description Type Department Provider    24 Abdominal Pain Acute cholecystitis ... ED to Hosp-Admission (Discharged) (ADMITTED) [facility], Jalen Falk MD; [place], ...              Was this an external facility discharge? No Discharge Facility: [facility]    Noted following upcoming appointments from discharge chart review:   [facility] follow up appointment(s):   Future Appointments   Date Time Provider Department Center   3/26/2024  1:00 PM John Hameed MD [id number]      Michaela Butts LPN  Care Transition Nurse

## 2024-01-30 ENCOUNTER — CARE COORDINATION (OUTPATIENT)
Dept: CASE MANAGEMENT | Age: 69
End: 2024-01-30

## 2024-03-24 PROBLEM — N13.30 HYDRONEPHROSIS OF RIGHT KIDNEY: Status: ACTIVE | Noted: 2024-03-24

## 2024-05-16 ENCOUNTER — TELEPHONE (OUTPATIENT)
Dept: INTERNAL MEDICINE | Age: 69
End: 2024-05-16

## 2024-05-16 DIAGNOSIS — J43.9 PULMONARY EMPHYSEMA, UNSPECIFIED EMPHYSEMA TYPE (HCC): Primary | ICD-10-CM

## 2024-06-27 DIAGNOSIS — I10 ESSENTIAL HYPERTENSION: Chronic | ICD-10-CM

## 2024-06-27 RX ORDER — AMLODIPINE BESYLATE 10 MG/1
10 TABLET ORAL DAILY
Qty: 90 TABLET | Refills: 3 | Status: SHIPPED | OUTPATIENT
Start: 2024-06-27

## 2024-06-27 NOTE — TELEPHONE ENCOUNTER
Bernard Hollingsworth is calling to request a refill on the following medication(s):    Medication Request:  Requested Prescriptions     Pending Prescriptions Disp Refills    amLODIPine (NORVASC) 10 MG tablet [Pharmacy Med Name: AMLODIPINE BESYLATE 10MG TABS] 90 tablet 3     Sig: TAKE ONE TABLET BY MOUTH ONCE A DAY       Last Visit Date (If Applicable):  12/26/2023    Next Visit Date:    Visit date not found

## 2024-07-15 DIAGNOSIS — I10 ESSENTIAL HYPERTENSION: Chronic | ICD-10-CM

## 2024-07-15 DIAGNOSIS — Z00.00 HEALTH CARE MAINTENANCE: ICD-10-CM

## 2024-07-16 RX ORDER — ROSUVASTATIN CALCIUM 10 MG/1
10 TABLET, COATED ORAL DAILY
Qty: 90 TABLET | Refills: 1 | Status: SHIPPED | OUTPATIENT
Start: 2024-07-16

## 2024-07-16 NOTE — TELEPHONE ENCOUNTER
Bernard Hollingsworth is calling to request a refill on the following medication(s):    Medication Request:  Requested Prescriptions     Pending Prescriptions Disp Refills    rosuvastatin (CRESTOR) 10 MG tablet [Pharmacy Med Name: ROSUVASTATIN CALCIUM 10MG TABS] 90 tablet 1     Sig: TAKE ONE TABLET BY MOUTH ONCE A DAY       Last Visit Date (If Applicable):  12/26/2023    Next Visit Date:    Visit date not found

## (undated) DEVICE — TOWEL,OR,DSP,ST,NATURAL,DLX,4/PK,20PK/CS: Brand: MEDLINE

## (undated) DEVICE — STRAP,POSITIONING,KNEE/BODY,FOAM,4X60": Brand: MEDLINE

## (undated) DEVICE — TROCAR: Brand: KII® SLEEVE

## (undated) DEVICE — LOOP LIG SUT SZ 0 L18IN ABSRB POLYDIOXANONE MFIL PDS II

## (undated) DEVICE — APPLIER CLP M L L11.4IN DIA10MM ENDOSCP ROT MULT FOR LIG

## (undated) DEVICE — GLOVE SURG SZ 8 L12IN THK75MIL DK GRN LTX FREE

## (undated) DEVICE — PACK LAP BASIC

## (undated) DEVICE — INSUFFLATION NEEDLE TO ESTABLISH PNEUMOPERITONEUM.: Brand: INSUFFLATION NEEDLE

## (undated) DEVICE — SUTURE MCRYL SZ 4-0 L18IN ABSRB UD L19MM PS-2 3/8 CIR PRIM Y496G

## (undated) DEVICE — Device

## (undated) DEVICE — GARMENT,MEDLINE,DVT,INT,CALF,MED, GEN2: Brand: MEDLINE

## (undated) DEVICE — SOLUTION ANTIFOG VIS SYS CLEARIFY LAPSCP

## (undated) DEVICE — SUTURE VCRL + SZ 0 L27IN ABSRB VLT L26MM UR-6 5/8 CIR VCP603H

## (undated) DEVICE — GOWN,SIRUS,NONRNF,SETINSLV,XL,20/CS: Brand: MEDLINE

## (undated) DEVICE — LIQUIBAND RAPID ADHESIVE 36/CS 0.8ML: Brand: MEDLINE

## (undated) DEVICE — STRAP ARMBRD W1.5XL32IN FOAM STR YET SFT W/ HK AND LOOP

## (undated) DEVICE — APPLICATOR MEDICATED 26 CC SOLUTION HI LT ORNG CHLORAPREP

## (undated) DEVICE — SUTURE VCRL SZ 2-0 L27IN ABSRB UD L26MM SH 1/2 CIR J417H

## (undated) DEVICE — SUTURE MCRYL + SZ 4 0 L18IN ABSRB UD PC 3 L16MM 3 8 CIR PRIM MCP845G

## (undated) DEVICE — SUTURE SZ 0 27IN 5/8 CIR UR-6  TAPER PT VIOLET ABSRB VICRYL J603H

## (undated) DEVICE — TROCAR: Brand: KII FIOS FIRST ENTRY

## (undated) DEVICE — GLOVE SURG SZ 75 CRM LTX FREE POLYISOPRENE POLYMER BEAD ANTI

## (undated) DEVICE — ELECTRODE LAP L36CM PTFE WIRE J HK CLEANCOAT